# Patient Record
Sex: FEMALE | Race: ASIAN | NOT HISPANIC OR LATINO
[De-identification: names, ages, dates, MRNs, and addresses within clinical notes are randomized per-mention and may not be internally consistent; named-entity substitution may affect disease eponyms.]

---

## 2024-01-29 PROBLEM — Z00.00 ENCOUNTER FOR PREVENTIVE HEALTH EXAMINATION: Status: ACTIVE | Noted: 2024-01-29

## 2024-03-04 ENCOUNTER — APPOINTMENT (OUTPATIENT)
Dept: GERIATRICS | Facility: CLINIC | Age: 89
End: 2024-03-04
Payer: MEDICARE

## 2024-03-04 VITALS
OXYGEN SATURATION: 97 % | DIASTOLIC BLOOD PRESSURE: 66 MMHG | SYSTOLIC BLOOD PRESSURE: 169 MMHG | WEIGHT: 133 LBS | HEIGHT: 57 IN | TEMPERATURE: 97.4 F | BODY MASS INDEX: 28.69 KG/M2 | HEART RATE: 58 BPM

## 2024-03-04 DIAGNOSIS — D64.9 ANEMIA, UNSPECIFIED: ICD-10-CM

## 2024-03-04 DIAGNOSIS — J30.9 ALLERGIC RHINITIS, UNSPECIFIED: ICD-10-CM

## 2024-03-04 DIAGNOSIS — Z13.21 ENCOUNTER FOR SCREENING FOR NUTRITIONAL DISORDER: ICD-10-CM

## 2024-03-04 DIAGNOSIS — Z11.59 ENCOUNTER FOR SCREENING FOR OTHER VIRAL DISEASES: ICD-10-CM

## 2024-03-04 DIAGNOSIS — E78.5 HYPERLIPIDEMIA, UNSPECIFIED: ICD-10-CM

## 2024-03-04 DIAGNOSIS — R60.0 LOCALIZED EDEMA: ICD-10-CM

## 2024-03-04 DIAGNOSIS — E55.9 VITAMIN D DEFICIENCY, UNSPECIFIED: ICD-10-CM

## 2024-03-04 DIAGNOSIS — Z82.0 FAMILY HISTORY OF EPILEPSY AND OTHER DISEASES OF THE NERVOUS SYSTEM: ICD-10-CM

## 2024-03-04 DIAGNOSIS — Z86.69 PERSONAL HISTORY OF OTHER DISEASES OF THE NERVOUS SYSTEM AND SENSE ORGANS: ICD-10-CM

## 2024-03-04 DIAGNOSIS — M19.90 UNSPECIFIED OSTEOARTHRITIS, UNSPECIFIED SITE: ICD-10-CM

## 2024-03-04 DIAGNOSIS — Z80.1 FAMILY HISTORY OF MALIGNANT NEOPLASM OF TRACHEA, BRONCHUS AND LUNG: ICD-10-CM

## 2024-03-04 DIAGNOSIS — Z80.42 FAMILY HISTORY OF MALIGNANT NEOPLASM OF PROSTATE: ICD-10-CM

## 2024-03-04 DIAGNOSIS — Z13.29 ENCOUNTER FOR SCREENING FOR OTHER SUSPECTED ENDOCRINE DISORDER: ICD-10-CM

## 2024-03-04 DIAGNOSIS — E03.9 HYPOTHYROIDISM, UNSPECIFIED: ICD-10-CM

## 2024-03-04 DIAGNOSIS — Z09 ENCOUNTER FOR FOLLOW-UP EXAMINATION AFTER COMPLETED TREATMENT FOR CONDITIONS OTHER THAN MALIGNANT NEOPLASM: ICD-10-CM

## 2024-03-04 PROCEDURE — 99205 OFFICE O/P NEW HI 60 MIN: CPT

## 2024-03-04 NOTE — REVIEW OF SYSTEMS
[Constipation] : constipation [Incontinence] : incontinence [As Noted in HPI] : as noted in HPI [Easy Bleeding] : a tendency for easy bleeding [Easy Bruising] : a tendency for easy bruising [Negative] : Integumentary

## 2024-03-06 LAB
25(OH)D3 SERPL-MCNC: 61.4 NG/ML
FOLATE SERPL-MCNC: >20 NG/ML
TSH SERPL-ACNC: 3.27 UIU/ML
VIT B12 SERPL-MCNC: 796 PG/ML

## 2024-03-08 LAB — VIT B1 SERPL-MCNC: 149 NMOL/L

## 2024-03-14 ENCOUNTER — APPOINTMENT (OUTPATIENT)
Dept: GERIATRICS | Facility: CLINIC | Age: 89
End: 2024-03-14
Payer: MEDICARE

## 2024-03-14 VITALS
DIASTOLIC BLOOD PRESSURE: 67 MMHG | SYSTOLIC BLOOD PRESSURE: 146 MMHG | WEIGHT: 133 LBS | HEIGHT: 57 IN | HEART RATE: 57 BPM | OXYGEN SATURATION: 98 % | TEMPERATURE: 97.4 F | BODY MASS INDEX: 28.69 KG/M2

## 2024-03-14 PROCEDURE — 93000 ELECTROCARDIOGRAM COMPLETE: CPT | Mod: 1L

## 2024-03-14 PROCEDURE — 99483 ASSMT & CARE PLN PT COG IMP: CPT

## 2024-03-22 ENCOUNTER — OUTPATIENT (OUTPATIENT)
Dept: OUTPATIENT SERVICES | Facility: HOSPITAL | Age: 89
LOS: 1 days | End: 2024-03-22
Payer: MEDICARE

## 2024-03-22 ENCOUNTER — APPOINTMENT (OUTPATIENT)
Dept: GERIATRICS | Facility: CLINIC | Age: 89
End: 2024-03-22
Payer: MEDICARE

## 2024-03-22 ENCOUNTER — APPOINTMENT (OUTPATIENT)
Dept: ULTRASOUND IMAGING | Facility: CLINIC | Age: 89
End: 2024-03-22
Payer: MEDICARE

## 2024-03-22 ENCOUNTER — TRANSCRIPTION ENCOUNTER (OUTPATIENT)
Age: 89
End: 2024-03-22

## 2024-03-22 DIAGNOSIS — M79.605 PAIN IN LEFT LEG: ICD-10-CM

## 2024-03-22 DIAGNOSIS — Z71.89 OTHER SPECIFIED COUNSELING: ICD-10-CM

## 2024-03-22 DIAGNOSIS — H91.90 UNSPECIFIED HEARING LOSS, UNSPECIFIED EAR: ICD-10-CM

## 2024-03-22 PROBLEM — M19.90 ARTHRITIS: Status: ACTIVE | Noted: 2024-03-22

## 2024-03-22 PROBLEM — E03.9 HYPOTHYROIDISM, UNSPECIFIED TYPE: Status: ACTIVE | Noted: 2024-03-22

## 2024-03-22 PROBLEM — J30.9 ALLERGIC RHINITIS, UNSPECIFIED SEASONALITY, UNSPECIFIED TRIGGER: Status: ACTIVE | Noted: 2024-03-22

## 2024-03-22 PROCEDURE — 99214 OFFICE O/P EST MOD 30 MIN: CPT | Mod: 95

## 2024-03-22 PROCEDURE — 93971 EXTREMITY STUDY: CPT

## 2024-03-22 PROCEDURE — 93971 EXTREMITY STUDY: CPT | Mod: 26,LT

## 2024-03-22 PROCEDURE — G2211 COMPLEX E/M VISIT ADD ON: CPT

## 2024-03-22 RX ORDER — LEVOTHYROXINE SODIUM 0.03 MG/1
25 TABLET ORAL
Qty: 90 | Refills: 0 | Status: ACTIVE | COMMUNITY
Start: 2024-03-22

## 2024-03-22 RX ORDER — ACETAMINOPHEN 500 MG/1
500 TABLET, COATED ORAL EVERY 8 HOURS
Refills: 0 | Status: ACTIVE | COMMUNITY
Start: 2024-03-22

## 2024-03-22 RX ORDER — AMLODIPINE BESYLATE 5 MG/1
5 TABLET ORAL TWICE DAILY
Refills: 0 | Status: ACTIVE | COMMUNITY
Start: 2024-03-22

## 2024-03-22 RX ORDER — LORATADINE 10 MG/1
10 TABLET ORAL
Refills: 0 | Status: ACTIVE | COMMUNITY
Start: 2024-03-22

## 2024-03-22 NOTE — PHYSICAL EXAM
[Normal] : alert, in no acute distress [Normal Outer Ear/Nose] : the ears and nose were normal in appearance [No Clubbing, Cyanosis] : no clubbing or cyanosis of the fingernails [Involuntary Movements] : no involuntary movements were seen [Respiration, Rhythm And Depth] : normal respiratory rhythm and effort [No Respiratory Distress] : no respiratory distress [Normal Hearing] : hearing was not normal [Normal Gait] : abnormal gait [Normal Insight/Judgment] : insight and judgment were not intact [de-identified] : b/l LE pitting edema  [de-identified] : slow, cautious, unsteady  [de-identified] : chronic skin changes b/l LE  [de-identified] : gait instability  [de-identified] : calm, dysphoric mood, +delusions  [MocaTotal] : 9 [FreeTextEntry1] : 3/14/24

## 2024-03-22 NOTE — REVIEW OF SYSTEMS
[Loss Of Hearing] : hearing loss [Constipation] : constipation [Incontinence] : incontinence [As Noted in HPI] : as noted in HPI [Easy Bleeding] : a tendency for easy bleeding [Easy Bruising] : a tendency for easy bruising [Negative] : Endocrine

## 2024-03-22 NOTE — HISTORY OF PRESENT ILLNESS
[1] : 1) Little interest or pleasure doing things for several days (1) [3] : 2) Feeling down, depressed, or hopeless for nearly every day (3) [PHQ-2 Positive] : PHQ-2 Positive [One fall no injury in past year] : Patient reported one fall in the past year without injury [FreeTextEntry1] : PMD Dr. Emmanuel Quezada   Memory changes progressively worse for past several years.   24/7 HHA.  has dementia.  3 HHAs, mainly for  who has UI and nocturia, diverticulosis with episodes of blood in stool.  Pt sleeps in separate bedroom.   - Mood/behavior:  down, sad, anxious.  Feels better when family comes over/family gatherings. Withdrawn from activities - not reading books anymore. Primary CG for  with dementia. HHA help started in 2022.  Still sowing.    - Motor Syx: Ambulates with cane outside. Walker at home 85% of the time.  Fall in 2/24 - 2nd night of being on Ambien.  CHRONIC NECK PAIN CHRONIC KNEE PAIN  - Sleep:  Difficulty sleeping for past several years. Sensitive to noise. Sleeps in separate bedroom from . Leaves door half-way open in case  needs help. Pt states  doesn't feel safe when she's not with him. Pt states this past Wed her  told her that the HHA allowed her son who is 22 y/o to come into their home and attached him while he was sleeping. Pt found  crying the following morning and asked him what happened and so he told her. Pt states the HHA's son has come came over previously 2-3x in the past few years.  DIL states pt sleeps approx. 2-4hrs of sleep "on a good night."   - Tried Ambien for insomia per family's request - did not work - side effects   - Appetite: Eating more now that family is bringing more food.  Indigestion after dinner. Takes beano.  Got dentures in 10/23 - eating more now.   - Toileting: Chronic constipation improves w/ prune.  Wears depends at night for UI episodes at night.     HTN / HLD  HYPOTHYROIDISM  [TextBox_37] : Last vision exam prepandemic, wears reading glasses  [[facility]] : >12 [[id number]] : 4 [FreeTextEntry4] : HCP form not on file: primary HCP is oldest wang Barrera (MD).   MOLST:

## 2024-03-22 NOTE — HISTORY OF PRESENT ILLNESS
[One fall no injury in past year] : Patient reported one fall in the past year without injury [Independent] : transferring/mobility [Full assistance needed] : Assistance needed managing medications [FAST Score: ____] : Functional Assessment Scale (FAST) Score: [unfilled] [Walker] : walker [Smoke Detector] : smoke detector [Carbon Monoxide Detector] : carbon monoxide detector [Wears Seat Belt] : wears seat belt [Other reason not done] : Other reason not done [I have developed a follow-up plan documented below in the note.] : I have developed a follow-up plan documented below in the note. [Moderate] : Stage: Moderate [Stable] : Status: Stable [Memory Lapses Or Loss] : stable memory impairment [Hostility Toward Caregivers] : stable aggression [Patient Observed To Be Agitated] : stable agitation [Sleep Disturbances] : stable sleep disturbances [] : stable wandering [Difficulty Finding Desired Words] : stable difficulty finding desired words [Fixed Beliefs Contradicted By Reality (Delusions)] : stable delusions [With Patient/Caregiver] : , with patient/caregiver [Designated Healthcare Proxy] : Designated healthcare proxy [Home] : at home, [unfilled] , at the time of the visit. [Medical Office: ([facility])___] : at the medical office located in  [Family Member] : family member [FreeTextEntry3] : WILNER Langley  [TextBox_37] : Last vision exam prepandemic, wears reading glasses  [FreeTextEntry1] : PMD Dr. Emmanuel Quezada   No acute events since last visit including falls, hospitalizations, ED visits, urgent care visits.  TODAY pt reports still not sleeping well but she is sleeping better since last visit.   Family states pt noted to have decreased severity of paranoid psychosis since starting Trazodone after last visit.  Family reports pt c/o Lt calf pain yesterday - this is new.  Today pt states had pain in anterior lower leg - higher up than usual shin level chronic pain.  Legs are swollen but not more than usual.  Gait has been stable c/w prior visit. No recent falls.  Otherwise in USOH per family.   - Mood/behavior:  down, sad, anxious.  Feels better when family comes over/family gatherings. Withdrawn from activities - not reading books anymore.  Still sowing.    - Motor Syx: Ambulates with cane outside. Uses walker at home 85% of the time.  Fall in 2/24 - 2nd night of being on Ambien.  CHRONIC NECK and KNEE PAIN - Takes Tylenol PRN   - Sleep:  Was "lethargic" and sleeping longer for several days after starting med but today is much better after sleeping much better last night.  - 3/14/24: Difficulty sleeping for past several years. Sensitive to noise. Sleeps in separate bedroom from . Leaves door half-way open in case  needs help. Pt states  doesn't feel safe when she's not with him. Pt states this past Wed her  told her that the HHA allowed her son who is 22 y/o to come into their home and attached him while he was sleeping. Pt found  crying the following morning and asked him what happened and so he told her. Pt states the HHA's son has come came over previously 2-3x in the past few years.  DIL states pt sleeps approx. 2-4hrs of sleep "on a good night."   - Tried Ambien for insomnia per family's request - did not work - side effects   - Appetite: Eating more now that family is bringing more food.  Indigestion after dinner. Takes beano.  Got dentures in 10/23.  - Toileting: Chronic constipation improves w/ prunes.  Wears depends at night for UI at night.   COGNITIVE DECLINE - 2/4/24: Memory changes progressively worse for past several years.   24/7 HHA.  has dementia.  3 HHAs, mainly for  who has UI and nocturia, diverticulosis with episodes of blood in stool.  Pt sleeps in separate bedroom.  - MoCA 9/30 w/ Cantonese translation on 3/14/24  HTN / HLD HYPOTHYROIDISM  [Driving Concerns] : not driving or driving without noted concerns [[facility]] : >12 [de-identified] : Few steps to get into 1st floor. Gets assistance to go/in out of home when goes out rarely.  [de-identified] : not driving  [de-identified] : On Antidepressant medication [GDS] : 7 on 3/14/24 per pt  [CorneScale] : 23 on 3/14/24 per family  [AdvancecareDate] : 3/14/24 [FreeTextEntry4] : HCP form on file: primary HCP is dtr Shakira (in [place]), then oldest son Bruce (doctor), then dtr Birgit documented however she passed away in 2020, then son Bert.      [facility] previously discussed:  Priority is improving sleep.  MOLST not on file - pending completion

## 2024-03-22 NOTE — REASON FOR VISIT
[Initial Evaluation] : an initial evaluation [Family Member] : family member [FreeTextEntry3] : wang Noel  and WILNER Hernandez  [FreeTextEntry1] : Dementia  [FreeTextEntry2] : who assist with history due to patient with baseline cognitive impairment

## 2024-03-22 NOTE — ASSESSMENT
[FreeTextEntry1] : Venous US ordered, r/o DVT Tylenol 1g TID for pain control discussed  Leg elevation when not walking  - Would maximize sensory input - hearing, vision, etc. f/u with ophto and ENT/audiology - Medications reviewed and discussed - at this time will hold off on antipsychotics but will consider in the future for psychosis if needed  Discussed r/b/a of continued trial of Trazodone for insomnia/mood - agree to c/w 25mg QHS for now.  We agree to re-evaluate in 1 week - Melatonin?  Monitor for AE.  Fall precautions discussed.  Sleep hygiene discussed - c/w 24/7 assistance for safety and help w/ ADLs - PT?   - Dementia Medications discussed - will hold off for now as priority is improving insomnia/paranoia symptoms. Would not trial ACHEi in setting of bradycardia.  May consider trial Namenda but hold off for now.   - Adv regular meals with company, MIND or Mediterranean diet - Advised inc daytime physical/cognitive activity - to consider formal day program and respite stays when possible/needed - Education, counseling, and support provided - f/u with SW for additional counseling, education, support, and community resources - including available support groups and classes for caregiver, and continuation of safety counseling including re: risk of financial errors/abuse, falls, injury, wandering, firearm safety - f/u for continuation of safety counseling including re: risk of financial errors/abuse, falls, injury, wandering, firearm safety, driving/accidents   - f/u with DCS/NP Patricia for ADC program enrollment   f/u ADLs, work hx, ACP discussion Will attempt EKG again at next in person visit ENT/audiology?  Optho f/u ?   f/u with me in 1wk, unless earlier PRN   Rest as per PMD

## 2024-03-22 NOTE — ASSESSMENT
[FreeTextEntry1] : - Pertinent available medical record reviewed- Medications reviewed with CG and reconciled - Recent labs from 2/26/24 reivewed in [place] Additional lab work ordered - f/u results   - Plan for formal cognitive/mood/behavior assessment at next visit  Will discuss sleep further at next visit Sleep hygiene ENT/audiology?  Optho f/u ?   Pend - ADLs, work hx, ACP discussion f/u HCP form - family to bring to next visit Consider EKG at next visit  Fall precautions - c/w 24/7 assistance for safety and help w/ ADLs   Rest as per PMD   f/u with me in 1-2 wks, unless earlier PRN

## 2024-03-22 NOTE — REASON FOR VISIT
[Follow-Up] : a follow-up visit [Family Member] : family member [FreeTextEntry1] : Dementia, paranoid delusions, insomnia  [FreeTextEntry2] : who assist with history due to patient with baseline cognitive impairment [FreeTextEntry3] : wang Noel  and WILNER Hernandez

## 2024-03-22 NOTE — PHYSICAL EXAM
[Normal Outer Ear/Nose] : the ears and nose were normal in appearance [Pedal Pulses Normal] : the pedal pulses are present [Normal] : no spinal tenderness [No Clubbing, Cyanosis] : no clubbing or cyanosis of the fingernails [Involuntary Movements] : no involuntary movements were seen [Normal Hearing] : hearing was not normal [Normal Gait] : abnormal gait [Normal Insight/Judgment] : insight and judgment were not intact [FreeTextEntry1] : b/l eyelid ptosis  [de-identified] : chronic skin changes b/l LE  [de-identified] : gait instability  [de-identified] : b/l LE pitting edema  [de-identified] : calm, dysphoric mood, +delusions

## 2024-03-28 ENCOUNTER — APPOINTMENT (OUTPATIENT)
Dept: GERIATRICS | Facility: CLINIC | Age: 89
End: 2024-03-28

## 2024-04-01 ENCOUNTER — APPOINTMENT (OUTPATIENT)
Dept: GERIATRICS | Facility: CLINIC | Age: 89
End: 2024-04-01

## 2024-04-01 RX ORDER — POLYETHYLENE GLYCOL 3350 17 G/17G
17 POWDER, FOR SOLUTION ORAL
Qty: 1 | Refills: 3 | Status: ACTIVE | COMMUNITY
Start: 2024-04-01 | End: 1900-01-01

## 2024-04-09 ENCOUNTER — NON-APPOINTMENT (OUTPATIENT)
Age: 89
End: 2024-04-09

## 2024-04-09 ENCOUNTER — APPOINTMENT (OUTPATIENT)
Dept: GERIATRICS | Facility: CLINIC | Age: 89
End: 2024-04-09
Payer: MEDICARE

## 2024-04-09 VITALS
DIASTOLIC BLOOD PRESSURE: 61 MMHG | HEART RATE: 67 BPM | TEMPERATURE: 97.5 F | SYSTOLIC BLOOD PRESSURE: 139 MMHG | OXYGEN SATURATION: 96 % | WEIGHT: 125.5 LBS | BODY MASS INDEX: 27.08 KG/M2 | HEIGHT: 57 IN

## 2024-04-09 DIAGNOSIS — Z79.899 OTHER LONG TERM (CURRENT) DRUG THERAPY: ICD-10-CM

## 2024-04-09 DIAGNOSIS — R26.89 OTHER ABNORMALITIES OF GAIT AND MOBILITY: ICD-10-CM

## 2024-04-09 DIAGNOSIS — R26.81 UNSTEADINESS ON FEET: ICD-10-CM

## 2024-04-09 PROCEDURE — G2211 COMPLEX E/M VISIT ADD ON: CPT

## 2024-04-09 PROCEDURE — 99214 OFFICE O/P EST MOD 30 MIN: CPT

## 2024-04-09 PROCEDURE — 93000 ELECTROCARDIOGRAM COMPLETE: CPT | Mod: 59

## 2024-04-21 ENCOUNTER — INPATIENT (INPATIENT)
Facility: HOSPITAL | Age: 89
LOS: 10 days | Discharge: HOME | End: 2024-05-02
Attending: INTERNAL MEDICINE | Admitting: INTERNAL MEDICINE
Payer: MEDICARE

## 2024-04-21 VITALS
RESPIRATION RATE: 18 BRPM | DIASTOLIC BLOOD PRESSURE: 44 MMHG | OXYGEN SATURATION: 100 % | SYSTOLIC BLOOD PRESSURE: 112 MMHG | HEART RATE: 44 BPM | TEMPERATURE: 98 F

## 2024-04-21 DIAGNOSIS — E78.5 HYPERLIPIDEMIA, UNSPECIFIED: ICD-10-CM

## 2024-04-21 DIAGNOSIS — M48.00 SPINAL STENOSIS, SITE UNSPECIFIED: ICD-10-CM

## 2024-04-21 DIAGNOSIS — Z29.9 ENCOUNTER FOR PROPHYLACTIC MEASURES, UNSPECIFIED: ICD-10-CM

## 2024-04-21 DIAGNOSIS — Z98.49 CATARACT EXTRACTION STATUS, UNSPECIFIED EYE: Chronic | ICD-10-CM

## 2024-04-21 DIAGNOSIS — I10 ESSENTIAL (PRIMARY) HYPERTENSION: ICD-10-CM

## 2024-04-21 DIAGNOSIS — R00.1 BRADYCARDIA, UNSPECIFIED: ICD-10-CM

## 2024-04-21 DIAGNOSIS — R93.89 ABNORMAL FINDINGS ON DIAGNOSTIC IMAGING OF OTHER SPECIFIED BODY STRUCTURES: ICD-10-CM

## 2024-04-21 DIAGNOSIS — R41.89 OTHER SYMPTOMS AND SIGNS INVOLVING COGNITIVE FUNCTIONS AND AWARENESS: ICD-10-CM

## 2024-04-21 DIAGNOSIS — R53.83 OTHER FATIGUE: ICD-10-CM

## 2024-04-21 DIAGNOSIS — F03.90 UNSPECIFIED DEMENTIA WITHOUT BEHAVIORAL DISTURBANCE: ICD-10-CM

## 2024-04-21 DIAGNOSIS — Z83.518 FAMILY HISTORY OF OTHER SPECIFIED EYE DISORDER: Chronic | ICD-10-CM

## 2024-04-21 LAB
A1C WITH ESTIMATED AVERAGE GLUCOSE RESULT: 6.1 % — HIGH (ref 4–5.6)
ADD ON TEST-SPECIMEN IN LAB: SIGNIFICANT CHANGE UP
ADD ON TEST-SPECIMEN IN LAB: SIGNIFICANT CHANGE UP
ALBUMIN SERPL ELPH-MCNC: 4.1 G/DL — SIGNIFICANT CHANGE UP (ref 3.3–5)
ALP SERPL-CCNC: 36 U/L — LOW (ref 40–120)
ALT FLD-CCNC: 13 U/L — SIGNIFICANT CHANGE UP (ref 4–33)
ANION GAP SERPL CALC-SCNC: 15 MMOL/L — HIGH (ref 7–14)
APTT BLD: 32 SEC — SIGNIFICANT CHANGE UP (ref 24.5–35.6)
AST SERPL-CCNC: 36 U/L — HIGH (ref 4–32)
BASE EXCESS BLDV CALC-SCNC: 2 MMOL/L — SIGNIFICANT CHANGE UP (ref -2–3)
BASOPHILS # BLD AUTO: 0.03 K/UL — SIGNIFICANT CHANGE UP (ref 0–0.2)
BASOPHILS NFR BLD AUTO: 0.6 % — SIGNIFICANT CHANGE UP (ref 0–2)
BILIRUB SERPL-MCNC: 0.5 MG/DL — SIGNIFICANT CHANGE UP (ref 0.2–1.2)
BUN SERPL-MCNC: 29 MG/DL — HIGH (ref 7–23)
CA-I SERPL-SCNC: 1.39 MMOL/L — HIGH (ref 1.15–1.33)
CALCIUM SERPL-MCNC: 10.6 MG/DL — HIGH (ref 8.4–10.5)
CHLORIDE BLDV-SCNC: 105 MMOL/L — SIGNIFICANT CHANGE UP (ref 96–108)
CHLORIDE SERPL-SCNC: 105 MMOL/L — SIGNIFICANT CHANGE UP (ref 98–107)
CHOLEST SERPL-MCNC: 168 MG/DL — SIGNIFICANT CHANGE UP
CK SERPL-CCNC: 118 U/L — SIGNIFICANT CHANGE UP (ref 25–170)
CO2 BLDV-SCNC: 30.2 MMOL/L — HIGH (ref 22–26)
CO2 SERPL-SCNC: 21 MMOL/L — LOW (ref 22–31)
CREAT SERPL-MCNC: 1.18 MG/DL — SIGNIFICANT CHANGE UP (ref 0.5–1.3)
EGFR: 43 ML/MIN/1.73M2 — LOW
EOSINOPHIL # BLD AUTO: 0.22 K/UL — SIGNIFICANT CHANGE UP (ref 0–0.5)
EOSINOPHIL NFR BLD AUTO: 4.6 % — SIGNIFICANT CHANGE UP (ref 0–6)
ESTIMATED AVERAGE GLUCOSE: 128 — SIGNIFICANT CHANGE UP
GAS PNL BLDV: 138 MMOL/L — SIGNIFICANT CHANGE UP (ref 136–145)
GAS PNL BLDV: SIGNIFICANT CHANGE UP
GLUCOSE BLDV-MCNC: 109 MG/DL — HIGH (ref 70–99)
GLUCOSE SERPL-MCNC: 112 MG/DL — HIGH (ref 70–99)
HCO3 BLDV-SCNC: 29 MMOL/L — SIGNIFICANT CHANGE UP (ref 22–29)
HCT VFR BLD CALC: 33.1 % — LOW (ref 34.5–45)
HCT VFR BLDA CALC: 34 % — LOW (ref 34.5–46.5)
HDLC SERPL-MCNC: 74 MG/DL — SIGNIFICANT CHANGE UP
HGB BLD CALC-MCNC: 11.4 G/DL — LOW (ref 11.7–16.1)
HGB BLD-MCNC: 11.1 G/DL — LOW (ref 11.5–15.5)
IANC: 2.53 K/UL — SIGNIFICANT CHANGE UP (ref 1.8–7.4)
IMM GRANULOCYTES NFR BLD AUTO: 0.2 % — SIGNIFICANT CHANGE UP (ref 0–0.9)
INR BLD: 1.05 RATIO — SIGNIFICANT CHANGE UP (ref 0.85–1.18)
LACTATE BLDV-MCNC: 1.4 MMOL/L — SIGNIFICANT CHANGE UP (ref 0.5–2)
LIPID PNL WITH DIRECT LDL SERPL: 75 MG/DL — SIGNIFICANT CHANGE UP
LYMPHOCYTES # BLD AUTO: 1.44 K/UL — SIGNIFICANT CHANGE UP (ref 1–3.3)
LYMPHOCYTES # BLD AUTO: 29.9 % — SIGNIFICANT CHANGE UP (ref 13–44)
MAGNESIUM SERPL-MCNC: 1.5 MG/DL — LOW (ref 1.6–2.6)
MCHC RBC-ENTMCNC: 33.5 GM/DL — SIGNIFICANT CHANGE UP (ref 32–36)
MCHC RBC-ENTMCNC: 33.7 PG — SIGNIFICANT CHANGE UP (ref 27–34)
MCV RBC AUTO: 100.6 FL — HIGH (ref 80–100)
MONOCYTES # BLD AUTO: 0.58 K/UL — SIGNIFICANT CHANGE UP (ref 0–0.9)
MONOCYTES NFR BLD AUTO: 12.1 % — SIGNIFICANT CHANGE UP (ref 2–14)
NEUTROPHILS # BLD AUTO: 2.53 K/UL — SIGNIFICANT CHANGE UP (ref 1.8–7.4)
NEUTROPHILS NFR BLD AUTO: 52.6 % — SIGNIFICANT CHANGE UP (ref 43–77)
NON HDL CHOLESTEROL: 94 MG/DL — SIGNIFICANT CHANGE UP
NRBC # BLD: 0 /100 WBCS — SIGNIFICANT CHANGE UP (ref 0–0)
NRBC # FLD: 0 K/UL — SIGNIFICANT CHANGE UP (ref 0–0)
PCO2 BLDV: 53 MMHG — HIGH (ref 39–52)
PH BLDV: 7.34 — SIGNIFICANT CHANGE UP (ref 7.32–7.43)
PLATELET # BLD AUTO: 158 K/UL — SIGNIFICANT CHANGE UP (ref 150–400)
PO2 BLDV: 36 MMHG — SIGNIFICANT CHANGE UP (ref 25–45)
POTASSIUM BLDV-SCNC: 4.5 MMOL/L — SIGNIFICANT CHANGE UP (ref 3.5–5.1)
POTASSIUM SERPL-MCNC: 4.5 MMOL/L — SIGNIFICANT CHANGE UP (ref 3.5–5.3)
POTASSIUM SERPL-SCNC: 4.5 MMOL/L — SIGNIFICANT CHANGE UP (ref 3.5–5.3)
PROT SERPL-MCNC: 7.2 G/DL — SIGNIFICANT CHANGE UP (ref 6–8.3)
PROTHROM AB SERPL-ACNC: 11.7 SEC — SIGNIFICANT CHANGE UP (ref 9.5–13)
RBC # BLD: 3.29 M/UL — LOW (ref 3.8–5.2)
RBC # FLD: 13.2 % — SIGNIFICANT CHANGE UP (ref 10.3–14.5)
SAO2 % BLDV: 60.2 % — LOW (ref 67–88)
SODIUM SERPL-SCNC: 141 MMOL/L — SIGNIFICANT CHANGE UP (ref 135–145)
TRIGL SERPL-MCNC: 93 MG/DL — SIGNIFICANT CHANGE UP
TROPONIN T, HIGH SENSITIVITY RESULT: 51 NG/L — HIGH
TROPONIN T, HIGH SENSITIVITY RESULT: 57 NG/L — CRITICAL HIGH
WBC # BLD: 4.81 K/UL — SIGNIFICANT CHANGE UP (ref 3.8–10.5)
WBC # FLD AUTO: 4.81 K/UL — SIGNIFICANT CHANGE UP (ref 3.8–10.5)

## 2024-04-21 PROCEDURE — 99222 1ST HOSP IP/OBS MODERATE 55: CPT | Mod: GC

## 2024-04-21 PROCEDURE — 70498 CT ANGIOGRAPHY NECK: CPT | Mod: 26,MC

## 2024-04-21 PROCEDURE — 71045 X-RAY EXAM CHEST 1 VIEW: CPT | Mod: 26

## 2024-04-21 PROCEDURE — 99223 1ST HOSP IP/OBS HIGH 75: CPT

## 2024-04-21 PROCEDURE — 99497 ADVNCD CARE PLAN 30 MIN: CPT | Mod: 25

## 2024-04-21 PROCEDURE — 70496 CT ANGIOGRAPHY HEAD: CPT | Mod: 26,MC

## 2024-04-21 PROCEDURE — 99285 EMERGENCY DEPT VISIT HI MDM: CPT

## 2024-04-21 RX ORDER — MAGNESIUM SULFATE 500 MG/ML
2 VIAL (ML) INJECTION ONCE
Refills: 0 | Status: COMPLETED | OUTPATIENT
Start: 2024-04-21 | End: 2024-04-21

## 2024-04-21 RX ORDER — SODIUM CHLORIDE 9 MG/ML
500 INJECTION, SOLUTION INTRAVENOUS
Refills: 0 | Status: COMPLETED | OUTPATIENT
Start: 2024-04-21 | End: 2024-04-21

## 2024-04-21 RX ORDER — ACETAMINOPHEN 500 MG
650 TABLET ORAL EVERY 6 HOURS
Refills: 0 | Status: DISCONTINUED | OUTPATIENT
Start: 2024-04-21 | End: 2024-05-02

## 2024-04-21 RX ORDER — FOLIC ACID 0.8 MG
1 TABLET ORAL DAILY
Refills: 0 | Status: DISCONTINUED | OUTPATIENT
Start: 2024-04-21 | End: 2024-05-02

## 2024-04-21 RX ORDER — HEPARIN SODIUM 5000 [USP'U]/ML
5000 INJECTION INTRAVENOUS; SUBCUTANEOUS EVERY 12 HOURS
Refills: 0 | Status: DISCONTINUED | OUTPATIENT
Start: 2024-04-21 | End: 2024-05-02

## 2024-04-21 RX ORDER — ASPIRIN/CALCIUM CARB/MAGNESIUM 324 MG
81 TABLET ORAL DAILY
Refills: 0 | Status: DISCONTINUED | OUTPATIENT
Start: 2024-04-21 | End: 2024-05-02

## 2024-04-21 RX ORDER — LANOLIN ALCOHOL/MO/W.PET/CERES
6 CREAM (GRAM) TOPICAL
Refills: 0 | Status: DISCONTINUED | OUTPATIENT
Start: 2024-04-21 | End: 2024-05-02

## 2024-04-21 RX ORDER — LEVOTHYROXINE SODIUM 125 MCG
25 TABLET ORAL DAILY
Refills: 0 | Status: DISCONTINUED | OUTPATIENT
Start: 2024-04-21 | End: 2024-05-02

## 2024-04-21 RX ORDER — ATORVASTATIN CALCIUM 80 MG/1
10 TABLET, FILM COATED ORAL AT BEDTIME
Refills: 0 | Status: DISCONTINUED | OUTPATIENT
Start: 2024-04-21 | End: 2024-05-02

## 2024-04-21 RX ADMIN — SODIUM CHLORIDE 500 MILLILITER(S): 9 INJECTION, SOLUTION INTRAVENOUS at 21:45

## 2024-04-21 RX ADMIN — Medication 6 MILLIGRAM(S): at 22:32

## 2024-04-21 RX ADMIN — ATORVASTATIN CALCIUM 10 MILLIGRAM(S): 80 TABLET, FILM COATED ORAL at 22:34

## 2024-04-21 RX ADMIN — Medication 25 GRAM(S): at 11:50

## 2024-04-21 NOTE — CONSULT NOTE ADULT - SUBJECTIVE AND OBJECTIVE BOX
Patient seen and evaluated at bedside    Reason for consult: AMS    HPI:  Mrs. Patino is a 89yoF presenting to the ED after an episode of AMS.   PMH of HTN, HLD, Hypothyroidism, dementia     Patient was at an assisted living facility this am. She woke up with baseline motor/cognitive abilities. She walked with her walker and son to have breakfast when she became acutely altered.     According to the son the patient slumped sideways, could not speak, was blinking and tracking his movements but delayed and non-verbal. This event lasted 15minutes. When she started to recover she was altered and confused. Vital signs were taken by staff at the facility and noted to have bradycardia, HR 40's and  systolic.     Upon arrival to ED she is improving but still not at baseline per son. ECG shows SInus Akhil without ischemic changes with 's systolic. Her labs reveal normal perfusion markers, troponin is elevated to 57.       PMHx:   HTN (hypertension)    HLD (hyperlipidemia)    Hypothyroid    H/O: depression        PSHx:       Allergies:  No Known Allergies      Home Meds:    Current Medications:   magnesium sulfate  IVPB 2 Gram(s) IV Intermittent Once        Review of Systems:  Un-able to obtain: AMS    Physical Exam:  T(F): 97.5 (04-21), Max: 97.5 (04-21)  HR: 44 (04-21) (44 - 44)  BP: 112/44 (04-21) (112/44 - 112/44)  RR: 18 (04-21)  SpO2: 100% (04-21)  GENERAL: No acute distress, well-developed  HEAD:  Atraumatic, Normocephalic  ENT: EOMI, PERRLA, conjunctiva and sclera clear, Neck supple, No JVD, moist mucosa  CHEST/LUNG: Clear to auscultation bilaterally; No wheeze, equal breath sounds bilaterally   BACK: No spinal tenderness  HEART: Regular rate and rhythm  ABDOMEN: Soft, Nontender, Nondistended; Bowel sounds present  EXTREMITIES:  No clubbing, cyanosis, or edema  Neuro: AMS  SKIN: Normal color, No rashes or lesions      CXR: Personally reviewed    Labs: Personally reviewed                        11.1   4.81  )-----------( 158      ( 21 Apr 2024 10:43 )             33.1     04-21    141  |  105  |  29<H>  ----------------------------<  112<H>  4.5   |  21<L>  |  1.18    Ca    10.6<H>      21 Apr 2024 10:43  Mg     1.50     04-21    TPro  7.2  /  Alb  4.1  /  TBili  0.5  /  DBili  x   /  AST  36<H>  /  ALT  13  /  AlkPhos  36<L>  04-21    PT/INR - ( 21 Apr 2024 10:43 )   PT: 11.7 sec;   INR: 1.05 ratio         PTT - ( 21 Apr 2024 10:43 )  PTT:32.0 sec        Thyroid Stimulating Hormone, Serum: 3.39 uIU/mL (04-21 @ 10:43)

## 2024-04-21 NOTE — ED PROVIDER NOTE - OBJECTIVE STATEMENT
89-year-old female past medical history of hypothyroidism, hypercholesterolemia, hypertension, depression now presenting after episode of unresponsiveness lasting for 15 minutes.  Patient accompanied with the son at bedside who reports the patient was sitting in the dining table at which time lost consciousness, event was witnessed by aide and recorded on camera, episode lasted for 15 minutes at which time paramedics arrived onsite, patient had gradual resolution of symptoms.  Denies chest pain, shortness of breath.  No facial asymmetry/weakness, no arm/leg weakness/numbness.  At baseline mobilizes with a walker.  No episodes of fever, sore throat, neck pain, vomiting urinary/bowel complaints, skin rash/lesions, joint swellings.  There were no incidences of trauma/falls.  Patient is not on anticoagulants.  No prior similar history in the past.

## 2024-04-21 NOTE — ED PROVIDER NOTE - PHYSICAL EXAMINATION
PHYSICAL EXAM:  GENERAL: NAD, lying in bed comfortably  HEAD:  Atraumatic, Normocephalic  EYES: EOMI, PERRLA, conjunctiva and sclera clear  ENT: No erythema/pallor/petechiae/lesions  NECK: Supple, No JVD  LUNG: CTA b/l; no r/r/w  HEART: RRR, +S1/S2; No m/r/g  ABDOMEN: soft, NT/ND; BS audible   EXTREMITIES:  2+ Peripheral Pulses, brisk cap refill. No clubbing, cyanosis, or edema  NERVOUS SYSTEM:  AAOx3, speech clear. No sensory/motor deficits. Cannot assess gait.   MSK: FROM all 4 extremities, full and equal strength  SKIN: No rashes or lesions

## 2024-04-21 NOTE — ED PROVIDER NOTE - ATTENDING CONTRIBUTION TO CARE
89-year-old female past medical history hypothyroidism, hyperlipidemia, hypertension, depression presents after episode of loss of consciousness at home.  Patient was at rest, sitting at a table at onset of symptoms.  Per son at bedside episode lasted approximately 15 minutes.  Patient was reported to be breathing the entire time.  Patient found to be bradycardic, heart rate in 40s.  Given atropine by EMS without significant change.  Given atropine by EMS without significant change in heart rate.  No reported head trauma.	Facial asymmetry.  No recent illness.  No similar prior episodes.  Patient denies headache, chest pain, shortness of breath, abdominal pain, nausea, vomiting.  Exam as above  Unclear etiology of episode of loss of consciousness.  Concern for possible symptomatic bradycardia.  Patient normotensive at time of evaluation.  Plan: Labs, telemetry monitor, chest x-ray, head CT, reassess.  Discussed with son patient will likely need admission.

## 2024-04-21 NOTE — H&P ADULT - PROBLEM SELECTOR PLAN 6
lovenox ppx  PT eval  DNR/DNI, no PEG, no HD, yes IVF and abx  dc pending further eval  care d/w Jillian, children Lovenox ppx  PT eval  DNR/DNI, no PEG, no HD, yes IVF and abx  dc pending further eval  care d/w Jillian, children Noted on CT in c-spine  - OP f/u, PT

## 2024-04-21 NOTE — H&P ADULT - ASSESSMENT
89F dementia (A&Ox2, walks with walker, eats regular food) from home w/ 24/7 private hire aides, HTN, HLD, hypothyroid, recent onset of delusions who presents with unresponsive episode 4/21 at 911am while sitting down for am breakfast noted to have relative hypotension and bradycarida, admitted for further eval.

## 2024-04-21 NOTE — H&P ADULT - NSICDXPASTMEDICALHX_GEN_ALL_CORE_FT
PAST MEDICAL HISTORY:  Dementia     H/O: depression     HLD (hyperlipidemia)     HTN (hypertension)     Hypothyroid

## 2024-04-21 NOTE — H&P ADULT - NSHPLABSRESULTS_GEN_ALL_CORE
LABS:                        11.1   4.81  )-----------( 158      ( 21 Apr 2024 10:43 )             33.1     04-21    141  |  105  |  29<H>  ----------------------------<  112<H>  4.5   |  21<L>  |  1.18    Ca    10.6<H>      21 Apr 2024 10:43  Mg     1.50     04-21    TPro  7.2  /  Alb  4.1  /  TBili  0.5  /  DBili  x   /  AST  36<H>  /  ALT  13  /  AlkPhos  36<L>  04-21      PT/INR - ( 21 Apr 2024 10:43 )   PT: 11.7 sec;   INR: 1.05 ratio         PTT - ( 21 Apr 2024 10:43 )  PTT:32.0 sec      Urinalysis Basic - ( 21 Apr 2024 10:43 )  Color: x / Appearance: x / SG: x / pH: x  Gluc: 112 mg/dL / Ketone: x  / Bili: x / Urobili: x   Blood: x / Protein: x / Nitrite: x   Leuk Esterase: x / RBC: x / WBC x   Sq Epi: x / Non Sq Epi: x / Bacteria: x      VBG 04-21 @ 10:43  pH: 7.34/pCO2: 53 /pO2: 36/HCO3: 29/lactate: 1.4    IMPRESSION:    CT HEAD:  1.  No evidence of acute intracranial hemorrhage or midline shift.  2.  Subacute to chronic appearing infarcts in the bilateral basal ganglia.  3.  Chronic small vessel disease.    CTA BRAIN:  1.  Multifocal severe stenosis in the left A2 segment with distal   reconstitution.  2.  Moderate to severe focal narrowing in the proximal right M1 segment.  3.  Other focal mild to moderate luminal narrowings in the major   intracranial vasculature as discussed above.    CTA NECK:  1.  No evidence of critical stenosis by NASCET criteria.  2.  Nodular calcification in the right lobe of the thyroid. Recommend   nonemergent thyroid ultrasound for further characterization if clinically   indicated.  3.  The level degenerative change of the cervical spine, most   significantly at the C4-C5 level where there is severe narrowing of the   spinal canal. Recommend MRI of the cervical spine for further evaluation.  4.  Subcentimeter nodule in the right lung apex. Recommend follow-up   imaging as per Fleischner criteria.    EKG: SB

## 2024-04-21 NOTE — ED ADULT NURSE NOTE - NSFALLHARMRISKINTERV_ED_ALL_ED

## 2024-04-21 NOTE — ED ADULT NURSE NOTE - OBJECTIVE STATEMENT
Patient presented to ED from home via EMS with a chief complaint of syncopal episode earlier this AM around 9am with aide. As per son, she was sitting at the kitchen table and had a 15 minute episode of non-responsiveness. As per EMS when they arrived she was bradycardic in the low 40's and was given two doses of atropine, which at that point became somewhat responsive. Denies hitting head, dizziness, n/v/d, recent illness. Patient takes amlodipine and metoprolol XR but did not take her medications this AM. Denies any pain at this time  Patient is A/O x 4, responsive and able to speak in full sentences  NAD, skin intact, PERRLA, speech clear, neurologically intact with no deficits, strength b/l upper and lower extremities 5/5, sensation intact b/l upper and lower extremities, Rate and rhythm irregular sinus cookie S1 and S2 heard with no murmurs radial and pedal pulses present, capillary refill <3 , lungs clear b/l lobes throughout with no adventitious sounds or accessory muscle use, even and unlabored, abdomen soft and non-tender, bowel sounds heard x 4 quadrant, no edema noted. Safety maintained, bed in lowest position, call bell within reach, plan of care reviewed with patient , addressed all questions and concern, will continue to monitor patient.

## 2024-04-21 NOTE — H&P ADULT - PROBLEM SELECTOR PLAN 8
Lovenox ppx  PT eval  DNR/DNI, no PEG, no HD, yes IVF and abx  dc pending further eval  care d/w Jillian, children Lovenox ppx  PT eval  DNR/DNI, no PEG, no HD, yes IVF and abx  dc pending further eval  care d/w Jillian at bedside

## 2024-04-21 NOTE — ED PROVIDER NOTE - NSICDXPASTMEDICALHX_GEN_ALL_CORE_FT
PAST MEDICAL HISTORY:  H/O: depression     HLD (hyperlipidemia)     HTN (hypertension)     Hypothyroid

## 2024-04-21 NOTE — ED ADULT TRIAGE NOTE - CHIEF COMPLAINT QUOTE
ems reports pt unresponsive this am upon their arrival with heart rate - 40's.  given atropine 1 mg  x 2 with pt  more responsive.  hr 44 at present. pt awake, oriented  x 3, denies pain. c/o weakness

## 2024-04-21 NOTE — GOALS OF CARE CONVERSATION - ADVANCED CARE PLANNING - BILLING PROVIDER USE ONLY
Attending or ALICE Only Post-Care Instructions: I reviewed with the patient in detail post-care instructions. Patient is to wear sunprotection, and avoid picking at any of the treated lesions. Pt may apply Vaseline to crusted or scabbing areas. Number Of Freeze-Thaw Cycles: 1 freeze-thaw cycle Include Z78.9 (Other Specified Conditions Influencing Health Status) As An Associated Diagnosis?: No Medical Necessity Clause: This procedure was medically necessary because the lesions that were treated were: Duration Of Freeze Thaw-Cycle (Seconds): 10-15 Consent: The patient's consent was obtained including but not limited to risks of crusting, scabbing, blistering, scarring, darker or lighter pigmentary change, recurrence, incomplete removal and infection. Detail Level: Detailed Medical Necessity Information: It is in your best interest to select a reason for this procedure from the list below. All of these items fulfill various CMS LCD requirements except the new and changing color options.

## 2024-04-21 NOTE — H&P ADULT - NSHPPHYSICALEXAM_GEN_ALL_CORE
T(C): 36.3 (04-21-24 @ 17:05), Max: 36.9 (04-21-24 @ 14:03)  HR: 64 (04-21-24 @ 17:05) (44 - 68)  BP: 150/70 (04-21-24 @ 17:05) (112/44 - 178/82)  RR: 18 (04-21-24 @ 17:05) (15 - 18)  SpO2: 98% (04-21-24 @ 17:05) (96% - 100%)      CAPILLARY BLOOD GLUCOSE  POCT Blood Glucose.: 123 mg/dL (21 Apr 2024 10:34)  POCT Blood Glucose.: 118 mg/dL (21 Apr 2024 10:07)    PHYSICAL EXAM:  GENERAL: NAD, well-developed  HEAD:  Atraumatic, Normocephalic  EYES: EOMI, PERRLA, conjunctiva and sclera clear  NECK: Supple, no JVD  CHEST/LUNG: Clear to auscultation bilaterally; no wheeze  HEART: Regular rate and rhythm; no murmurs, rubs, or gallops  ABDOMEN: Soft, Nontender, Nondistended; Bowel sounds present  EXTREMITIES:  warm and well perfused, no clubbing, cyanosis, or edema  PSYCH: AAOx1, following commands   NEUROLOGY: non-focal  SKIN: hyperpigmented LE skin

## 2024-04-21 NOTE — H&P ADULT - PROBLEM SELECTOR PLAN 2
- as above  - saw by EP  - holding BB  - also holding trazodone   - inquired if family would want pacing for symptomatic bradycardia and possible PPM if deemed indicated, state no - as above  - saw by EP  - hypomagnesemia: repleted   - holding BB  - also holding trazodone   - inquired if family would want pacing for symptomatic bradycardia and possible PPM if deemed indicated, state no - as above  - saw by EP  - hypomagnesemia: repleted   - holding BB  - also holding trazodone (can cause cookie/hypotension)  - inquired if family would want pacing for symptomatic bradycardia and possible PPM if deemed indicated, state no

## 2024-04-21 NOTE — H&P ADULT - PROBLEM SELECTOR PLAN 3
At baseline can walk with walker, regular diet, knows family but can have short-term memory issues and forget dates and times, also has been having delusions  - hold trazodone and risperidone  - consult psych in am re: med management for home At baseline can walk with walker, regular diet, knows family but can have short-term memory issues and forget dates and times, also has been having delusions  - hold trazodone and risperidone  - consult psych in am re: med management for home given delusions distressing to pt reports being scared

## 2024-04-21 NOTE — CONSULT NOTE ADULT - ASSESSMENT
ED vitals notable for: ***. Labs notable for: ***. CT imaging disclosed: ***. Exam notable for ***.     Impression:    Recommendations:    Patient/plan d/w  ***. To be seen by attending in the AM with attestation to follow. Plan is not formalized until attending attestation is complete. Recommendations were relayed directly to ***. Note delayed d/t emergent patient care. ED vitals notable for: afebrile, HR 44-68, /82, RR 15-18, SpO2 % on RA, NSR on telemetry. Labs notable for: Hg 11.1, .6, TSH 3.39, troponin T 57->51, carbon dioxide 21, AG 15, BUN 29. CT imaging disclosed: as above - subacute to chronic appearing infarct in the bilateral caudate heads and left lenticulostriate nucleus, multifocal stenosis on CTA head.     Impression:   1) Episode of unresponsiveness of unclear etiology  2) History of dementia with behavioral disturbance, with worsening mentation in the last several weeks    Recommendations:  [] Telemetry monitoring  [] vEEG - to evaluate for focal slowing, epileptiform discharges, or seizures (event capture)   [] MRI brain w/wo contrast  [] TME labs to assess for reversible causes of encephalopathy (likely low-yield): ESR, CRP, vitamin B1/B6/B12/D, folate, ammonia, CK  [] If there is no medical contraindication - patient should be started on aspirin 81mg daily for secondary stroke prevention given the finding of chronic infarcts on CTH  [] If there is no medical contraindication - patient should be started on a high-intensity statin for secondary stroke prevention (target LDL <70)  [] Lipid panel, A1c  [] Elective TTE without bubble (can be performed outpatient)  [] Stroke education provided  [] Neurochecks, vitals at least q4h  [] Fall, seizure, aspiration precautions    To be seen by attending in the AM with attestation to follow. Plan is not formalized until attending attestation is complete. Recommendations were relayed directly to admitting hospitalist. Note delayed d/t emergent patient care. TOM MORGAN is a 89y RIGHT handed woman, with PMH significant for dementia, depression, hypothyroidism, HLD, HTN, who presents to [facility] ED on 3/21/2024, with c/o an episode of unresponsiveness, worsening mentation.    ED vitals notable for: afebrile, HR 44-68, /82, RR 15-18, SpO2 % on RA, NSR on telemetry. Labs notable for: Hg 11.1, .6, TSH 3.39, troponin T 57->51, carbon dioxide 21, AG 15, BUN 29. CT imaging disclosed: as above - subacute to chronic appearing infarct in the bilateral caudate heads and left lenticulostriate nucleus, multifocal stenosis on CTA head.     NIHSS: 6 (+2 questions, +2 aphasia, +2 dysarthria)  LKN: 4/21/2024, time unknown  pre-MRS: 3    No tenecteplase d/t outside therapeutic window.  No thrombectomy d/t no LVO.    Impression:   1) Episode of unresponsiveness of unclear etiology, without clear return to baseline; consider: TME, seizure, stroke  2) Acute to subacute encephalopathy over the course of several weeks i/s/o history of dementia with behavioral disturbance    Recommendations:  [] Telemetry monitoring  [] vEEG - to evaluate for focal slowing, epileptiform discharges, or seizures (event capture)  [] Hold off on ASMs for now   [] MRI brain w/wo contrast  [] TME labs to assess for reversible causes of encephalopathy (likely low-yield): ESR, CRP, vitamin B1/B6/B12/D, folate, ammonia, CK  [] If there is no medical contraindication - patient should be started on aspirin 81mg daily for secondary stroke prevention given the finding of chronic infarcts on CTH  [] Patient is on atorvastatin 10mg at home; target LDL <70 for secondary stroke prevention - agree that in this 89-year-old patient, the benefit is unclear  [] Lipid panel, A1c  [] Elective TTE without bubble (can be performed outpatient) - if within goals of care  [] Stroke education provided  [] Neurochecks, vitals at least q4h  [] Fall, seizure, aspiration precautions  [] GOC discussion was personally had with family - they would like to pursue neurologic workup inpatient including vEEG and MRI, if deemed appropriate by Neurology team    To be seen by attending in the AM with attestation to follow. Plan is not formalized until attending attestation is complete. Recommendations were relayed directly to admitting hospitalist. Note delayed d/t emergent patient care. TOM MORGAN is a 89y RIGHT handed woman, with PMH significant for dementia, depression, hypothyroidism, HLD, HTN, who presents to [facility] ED on 3/21/2024, with c/o an episode of unresponsiveness, worsening mentation.    ED vitals notable for: afebrile, HR 44-68, /82, RR 15-18, SpO2 % on RA, NSR on telemetry. Labs notable for: Hg 11.1, .6, TSH 3.39, troponin T 57->51, carbon dioxide 21, AG 15, BUN 29. CT imaging disclosed: as above - subacute to chronic appearing infarct in the bilateral caudate heads and left lenticulostriate nucleus, multifocal stenosis on CTA head.     NIHSS: 6 (+2 questions, +2 aphasia, +2 dysarthria)  LKN: 4/21/2024, time unknown  pre-MRS: 3    No tenecteplase d/t outside therapeutic window.  No thrombectomy d/t no LVO.    Impression:   1) Episode of unresponsiveness of unclear etiology, without clear return to baseline; consider: seizure - focal onset impaired awareness, hypersomnolence of Lewy Body dementia, TME - but no obvious etiology, stroke (not favored)  2) Acute to subacute encephalopathy over the course of several weeks i/s/o history of dementia with behavioral disturbance  3) Incidental finding of chronic infarcts on CTH. There is multifocal stenosis on CTA head.    Recommendations:  [] Telemetry monitoring  [] vEEG - to evaluate for focal slowing, epileptiform discharges, or seizures (event capture)  [] Hold off on ASMs for now   [] MRI brain w/wo contrast  [] TME labs to assess for reversible causes of encephalopathy (likely low-yield): ESR, CRP, vitamin B1/B6/B12/D, folate, ammonia, CK  [] If there is no medical contraindication - patient should be started on aspirin 81mg daily for secondary stroke prevention given the finding of chronic infarcts on CTH  [] Patient is on atorvastatin 10mg at home; target LDL <70 for secondary stroke prevention - agree that in this 89-year-old patient, the benefit is unclear  [] Lipid panel, A1c  [] Elective TTE without bubble (can be performed outpatient) - if within goals of care  [] Stroke education provided  [] Neurochecks, vitals at least q4h  [] Fall, seizure, aspiration precautions  [] GOC discussion was personally had with family - they would like to pursue neurologic workup inpatient including vEEG and MRI, if deemed appropriate by Neurology team    To be seen by attending in the AM with attestation to follow. Plan is not formalized until attending attestation is complete. Recommendations were relayed directly to admitting hospitalist. Note delayed d/t emergent patient care. TOM MORGAN is a 89y RIGHT handed woman, with PMH significant for dementia, depression, hypothyroidism, HLD, HTN, who presents to [facility] ED on 3/21/2024, with c/o an episode of unresponsiveness, worsening mentation.    ED vitals notable for: afebrile, HR 44-68, /82, RR 15-18, SpO2 % on RA, NSR on telemetry. Labs notable for: Hg 11.1, .6, TSH 3.39, troponin T 57->51, carbon dioxide 21, AG 15, BUN 29. CT imaging disclosed: as above - subacute to chronic appearing infarct in the bilateral caudate heads and left lenticulostriate nucleus, multifocal stenosis on CTA head.     NIHSS: 6 (+2 questions, +2 aphasia, +2 dysarthria)  LKN: 4/21/2024, time unknown  pre-MRS: 3    No tenecteplase d/t outside therapeutic window.  No thrombectomy d/t no LVO.    Impression:   1) Episode of unresponsiveness of unclear etiology, without clear return to baseline; consider: seizure - focal onset impaired awareness, hypersomnolence of Lewy Body dementia, TME - but no obvious trigger, stroke (not favored)  2) Acute to subacute encephalopathy over the course of several weeks i/s/o history of dementia with behavioral disturbance  3) Incidental finding of chronic infarcts on CTH. There is multifocal stenosis on CTA head.    Recommendations:  [] Telemetry monitoring  [] vEEG - to evaluate for focal slowing, epileptiform discharges, or seizures (event capture)  [] Hold off on ASMs for now   [] MRI brain w/wo contrast  [] TME labs to assess for reversible causes of encephalopathy (likely low-yield): ESR, CRP, vitamin B1/B6/B12/D, folate, ammonia, CK  [] If there is no medical contraindication - patient should be started on aspirin 81mg daily for secondary stroke prevention given the finding of chronic infarcts on CTH  [] Patient is on atorvastatin 10mg at home; target LDL <70 for secondary stroke prevention - agree that in this 89-year-old patient, the benefit is unclear  [] Lipid panel, A1c  [] Elective TTE without bubble (can be performed outpatient) - if within goals of care  [] Stroke education provided  [] Neurochecks, vitals at least q4h  [] Fall, seizure, aspiration precautions  [] GOC discussion was personally had with family - they would like to pursue neurologic workup inpatient including vEEG and MRI, if deemed appropriate by Neurology team    To be seen by attending in the AM with attestation to follow. Plan is not formalized until attending attestation is complete. Recommendations were relayed directly to admitting hospitalist. Note delayed d/t emergent patient care. TOM MORGAN is a 89y RIGHT handed woman, with PMH significant for dementia, depression, hypothyroidism, HLD, HTN, who presents to [facility] ED on 3/21/2024, with c/o an episode of unresponsiveness, worsening mentation.    ED vitals notable for: afebrile, HR 44-68, /82, RR 15-18, SpO2 % on RA, NSR on telemetry. Labs notable for: Hg 11.1, .6, TSH 3.39, troponin T 57->51, carbon dioxide 21, AG 15, BUN 29. CT imaging disclosed: as above - subacute to chronic appearing infarct in the bilateral caudate heads and left lenticulostriate nucleus, multifocal stenosis on CTA head.     NIHSS: 6 (+2 questions, +2 aphasia, +2 dysarthria)  LKN: 4/21/2024, time unknown  pre-MRS: 3    No tenecteplase d/t outside therapeutic window.  No thrombectomy d/t no LVO.    Impression:   1) Episode of unresponsiveness of unclear etiology, without clear return to baseline; consider: seizure - focal onset impaired awareness, hypersomnolence of Lewy Body dementia, TME - but no obvious trigger, stroke (not favored)  2) Acute to subacute encephalopathy over the course of several weeks i/s/o history of dementia with behavioral disturbance  3) Incidental finding of chronic infarcts on CTH. There is multifocal stenosis on CTA head.    Recommendations:  [] Telemetry monitoring  [] vEEG - to evaluate for focal slowing, epileptiform discharges, or seizures (event capture)  [] Hold off on ASMs for now   [] MRI brain w/wo contrast  [] TME labs to assess for reversible causes of encephalopathy (likely low-yield): ESR, CRP, vitamin B1/B6/B12/D, folate, ammonia, CK  [] Given concern for LBD - would avoid use of antipsychotic medications if possible - quetiapine (if patient can take PO) likely least offensive option  [] If there is no medical contraindication - patient should be started on aspirin 81mg daily for secondary stroke prevention given the finding of chronic infarcts on CTH  [] Patient is on atorvastatin 10mg at home; target LDL <70 for secondary stroke prevention - agree that in this 89-year-old patient, the benefit is unclear  [] Lipid panel, A1c  [] Elective TTE without bubble (can be performed outpatient) - if within goals of care  [] Stroke education provided  [] Neurochecks, vitals at least q4h  [] Fall, seizure, aspiration precautions  [] GOC discussion was personally had with family - they would like to pursue neurologic workup inpatient including vEEG and MRI, if deemed appropriate by Neurology team    To be seen by attending in the AM with attestation to follow. Plan is not formalized until attending attestation is complete. Recommendations were relayed directly to admitting hospitalist. Note delayed d/t emergent patient care. TOM MORGAN is a 89y RIGHT handed woman, with PMH significant for dementia, depression, hypothyroidism, HLD, HTN, who presents to [facility] ED on 3/21/2024, with c/o an episode of unresponsiveness, worsening mentation.    ED vitals notable for: afebrile, HR 44-68, /82, RR 15-18, SpO2 % on RA, NSR on telemetry. Labs notable for: Hg 11.1, .6, TSH 3.39, troponin T 57->51, carbon dioxide 21, AG 15, BUN 29. CT imaging disclosed: as above - subacute to chronic appearing infarct in the bilateral caudate heads and left lenticulostriate nucleus, multifocal stenosis on CTA head.     NIHSS: 6 (+2 questions, +2 aphasia, +2 dysarthria)  LKN: 4/21/2024, time unknown  pre-MRS: 3    No tenecteplase d/t outside therapeutic window.  No thrombectomy d/t no LVO.    Impression:   1) Episode of unresponsiveness of unclear etiology, without clear return to baseline; consider: seizure - focal onset impaired awareness, hypersomnolence of Lewy body dementia, TME - but no obvious trigger, stroke (not favored)  2) Acute to subacute encephalopathy over the course of several weeks i/s/o history of dementia with behavioral disturbance  3) Incidental finding of chronic infarcts on CTH. There is multifocal stenosis on CTA head.    Recommendations:  [] Telemetry monitoring  [] vEEG - to evaluate for focal slowing, epileptiform discharges, or seizures (event capture)  [] Hold off on ASMs for now   [] MRI brain w/wo contrast  [] TME labs to assess for reversible causes of encephalopathy (likely low-yield): ESR, CRP, vitamin B1/B6/B12/D, folate, ammonia, CK  [] Given concern for LBD - would avoid use of antipsychotic medications if possible - quetiapine (if patient can take PO) likely least offensive option  [] If there is no medical contraindication - patient should be started on aspirin 81mg daily for secondary stroke prevention given the finding of chronic infarcts on CTH  [] Patient is on atorvastatin 10mg at home; target LDL <70 for secondary stroke prevention - agree that in this 89-year-old patient, the benefit is unclear  [] Lipid panel, A1c  [] Elective TTE without bubble (can be performed outpatient) - if within goals of care  [] Stroke education provided  [] Neurochecks, vitals at least q4h  [] Fall, seizure, aspiration precautions  [] GOC discussion was personally had with family - they would like to pursue neurologic workup inpatient including vEEG and MRI, if deemed appropriate by Neurology team    To be seen by attending in the AM with attestation to follow. Plan is not formalized until attending attestation is complete. Recommendations were relayed directly to admitting hospitalist. Note delayed d/t emergent patient care.

## 2024-04-21 NOTE — H&P ADULT - PROBLEM SELECTOR PLAN 1
Episode of slumping over and not responding at table on 4/21 at 911am, no prior episodes, no seizure history.  Noted to have relative hypotension and bradycardia.  Recent addition of trazodone 4 wks prior and risperidone low dose 1 wk prior.  - seen by EP, monitor on tele, K 4 and Mg 2 goal, holding BB from home; per my d/w family would not want pacing or PPM if indicated as goal is to forego pain and suffering or invasive procedures per her wishes  - seen by neuro, CTA H&N w/ chronic CVA and intracranial stenosis, per neuro rec EEG and MRI, family OK to attempt these less invasive studies   - c/w tele for now  - holding BP meds as baseline prior SBP was 180s and now 110 this am---possible relative hypotension and intracranial stenosis leading to underperfusion of brain--will monitor trend and reinstate meds as tolerated with higher BP goal Episode of slumping over and not responding at table on 4/21 at 911am, no prior episodes, no seizure history.  Noted to have relative hypotension and bradycardia.  Recent addition of trazodone 4 wks prior and risperidone low dose 1 wk prior.  - seen by EP, monitor on tele, K 4 and Mg 2 goal, holding BB from home; per my d/w family would not want pacing or PPM if indicated as goal is to forego pain and suffering or invasive procedures per her wishes  - seen by neuro, CTA H&N w/ chronic CVA and intracranial stenosis, per neuro rec EEG and MRI, family OK to attempt these less invasive studies   - c/w tele for now  - holding BP meds as baseline prior SBP was 180s and now 110 this am---possible relative hypotension and intracranial stenosis leading to underperfusion of brain--will monitor trend and reinstate meds as tolerated with higher BP goal  - mild BUN elevation and Ca may suggest dehydration, will given 500 cc LR, check orthostatic when able Episode of slumping over and not responding at table on 4/21 at 911am, no prior episodes, no seizure history.  Noted to have relative hypotension and bradycardia.  Recent addition of trazodone 4 wks prior and risperidone low dose 1 wk prior.  - seen by EP, monitor on tele, K 4 and Mg 2 goal, holding BB from home; per my d/w family would not want pacing or PPM if indicated as goal is to forego pain and suffering or invasive procedures per her wishes  - seen by neuro, CTA H&N w/ chronic CVA and intracranial stenosis; cannot r/o CVA vs seizure at this time,  per neuro rec EEG and MRI, family OK to attempt these less invasive studies   - c/w tele for now  - holding BP meds as baseline prior SBP was 180s and now 110 this am---possible relative hypotension and intracranial stenosis leading to underperfusion of brain--will monitor trend and reinstate meds as tolerated with higher BP goal  - mild BUN elevation and Ca may suggest dehydration, will given 500 cc LR, check orthostatic when able - dysphagia screed and start diet if possible (baseline regular, regular consistency liquids)

## 2024-04-21 NOTE — GOALS OF CARE CONVERSATION - ADVANCED CARE PLANNING - CONVERSATION DETAILS
Met with family (son Bert in [place] and daughter Shakira from [place]) at bedside. Their father/her  also with dementia. Mom currently with confusion and baseline dementia cannot participate in conversation.  Per children mom had expressed her wishes long ago to be DNR/DNI and not suffer, also no feeding tubes or HD.   Report they have HCP forms but no on them.   Discussed mom currently stable.  Inquired re: goals should mom have symptomatic bradycardia while here requiring pacing, state that would be OK, advised if requires that may mean she would need a PPM, asked if that is something she would be OK with, both siblings agree she would have not have wanted a PPM.  Thus decided pacing would not be helpful if needed given no plan for more permanent solution.    Also no feeding tubes or HD.  Would be OK with IVF and antibiotics.  OK with neurology recs to pursue cause of unresponsiveness with recommended EEG and MRI (prior MRI, no metal in body).       MOLST completed with wishes  RN in ED to witness Met with family (son Bert in [place] and daughter Shakira from [place]) at bedside. Their father/her  also with dementia. Mom currently with confusion and baseline dementia cannot participate in conversation.  Per children mom had expressed her wishes long ago to be DNR/DNI and not suffer, also no feeding tubes or HD.   Report they have HCP forms but not on them.   Discussed mom currently stable.  Inquired re: goals should mom have symptomatic bradycardia while here requiring pacing, state that would be OK, advised if requires that may mean she would need a PPM, asked if that is something she would be OK with, both siblings agree she would have not have wanted a PPM.  Thus decided pacing would not be helpful if needed given no plan for more permanent solution.  Would be OK with IVF and antibiotics.  OK with neurology recs to pursue cause of unresponsiveness with recommended EEG and MRI (prior MRI, no metal in body).       MOLST completed with wishes  RN in ED to witness

## 2024-04-21 NOTE — ED ADULT NURSE REASSESSMENT NOTE - NS ED NURSE REASSESS COMMENT FT1
Break RN: received report from SAPNA Brown. pt @ CT. family at bedside. pending ua sample.
Patient is resting comfortably at this time, NAD, RR even and unlabored, no c/o pain, NSR on CM, daughter at bedside, pending urine at this time, plan of care reviewed, safety maintained, will continue to monitor patient
Report received from day RN Negra Pt A&ox3, on room air coming to ED c/o weakness. Pt history of HLD, HTN, hypothyroid. Pt with no acute changes at this time. Neuro intact, no deficits noted, no facia droop, no slurred speech, ROM/sensory intact. NSR on cardiac monitor. Pt respirations even and unlabored, chest rise and fall equal b/l. Pt denies chest pain, HA, SOB, dizziness, N/V/D, fever/chills. Right 20g patent, no redness or swelling site. Pt medicated as ordered. Stretcher in lowest position, call bell within reach, pt safety maintained.

## 2024-04-21 NOTE — CONSULT NOTE ADULT - SUBJECTIVE AND OBJECTIVE BOX
Neurology - Consult Note    -  Spectra: [phone] ([facility]), [phone] ([facility]). For new consults, please page: [phone] ([facility]), [phone] ([facility]).  -    HPI: Patient TOM MORGAN is a 89y (19-Apr-1929) *** handed wo/man who presents to *** ED on ***, with c/o ***.    PMH significant for: ***    Review of Systems:  INCOMPLETE   All other review of systems is negative unless indicated above.    Allergies:  No Known Allergies      PMHx/PSHx/Family Hx: As above, otherwise see below   HTN (hypertension)    HLD (hyperlipidemia)    Hypothyroid    H/O: depression        Social Hx:  Per HPI    Medications:  MEDICATIONS  (STANDING):    MEDICATIONS  (PRN):      Vitals:  T(C): 36.4 (04-21-24 @ 10:04), Max: 36.4 (04-21-24 @ 10:04)  HR: 68 (04-21-24 @ 12:30) (44 - 68)  BP: 146/64 (04-21-24 @ 12:30) (112/44 - 146/64)  RR: 15 (04-21-24 @ 12:30) (15 - 18)  SpO2: 96% (04-21-24 @ 12:30) (96% - 100%)    Physical Examination: INCOMPLETE  General - Sitting up on ED cart  Cardiovascular - No LE edema  Eyes - Fundoscopy not performed due to safety precautions in the setting of infection risk, non-injected conjunctivae, anicteric sclerae    Neurologic Exam:  Mental status:  - Awake, Alert  - Oriented to: person, place, and time  - Speech: fluent  - Repetition and naming: intact   - Follows simple and complex commands   - Attention/concentration: intact  - Recent and remote memory (including registration and recall): registration intact, 3/3 on 3-word recall  - Fund of knowledge: intact    Cranial nerves - PERRL, VFF on confrontational testing, EOMI - no nystagmus, face sensation (V1-V3) intact b/l, facial strength intact without asymmetry b/l, hearing intact b/l with finger rub test, palate with symmetric elevation, shoulder shrug intact b/l, tongue midline on protrusion with full lateral movement  Dysarthria: ***    Motor - Normal bulk and tone throughout. No pronator drift.  Strength testing (R/L)  Deltoid:  5/5  Biceps:  5/5        Triceps:  5/5       Wrist Extension:  5/5      Wrist Flexion:  5/5       Interossei:  5/5        :  5/5    Hip Flexion:  5/5  Hip Extension:  5/5      Knee Flexion:  5/5      Knee Extension:  5/5      Dorsiflexion:  5/5      Plantar Flexion:  5/5    Sensation - Light touch/vibration intact throughout    DTRs (R/L)  Biceps:  2+/2+        Triceps:  2+/2+       Brachioradialis:  2+/2+        Patellar:  2+/2+      Ankle:  2+/2+      Plantar response:  Down/Down    Coordination - Finger to Nose, Heel to Shin intact b/l. No tremors appreciated.    Gait and station - Unable to assess d/t fall risk/safety concerns.    Labs:                        11.1   4.81  )-----------( 158      ( 21 Apr 2024 10:43 )             33.1     04-21    141  |  105  |  29<H>  ----------------------------<  112<H>  4.5   |  21<L>  |  1.18    Ca    10.6<H>      21 Apr 2024 10:43  Mg     1.50     04-21    TPro  7.2  /  Alb  4.1  /  TBili  0.5  /  DBili  x   /  AST  36<H>  /  ALT  13  /  AlkPhos  36<L>  04-21    CAPILLARY BLOOD GLUCOSE      POCT Blood Glucose.: 123 mg/dL (21 Apr 2024 10:34)    LIVER FUNCTIONS - ( 21 Apr 2024 10:43 )  Alb: 4.1 g/dL / Pro: 7.2 g/dL / ALK PHOS: 36 U/L / ALT: 13 U/L / AST: 36 U/L / GGT: x             PT/INR - ( 21 Apr 2024 10:43 )   PT: 11.7 sec;   INR: 1.05 ratio         PTT - ( 21 Apr 2024 10:43 )  PTT:32.0 sec  CSF:                  Radiology:     Neurology - Consult Note    -  Spectra: [phone] ([facility]), [phone] ([facility]). For new consults, please page: [phone] ([facility]), [phone] ([facility]).  -    HPI: Patient TMO MORGAN is a 89y (19-Apr-1929) RIGHT handed woman who presents to [facility] ED on 3/21/2024, with c/o an episode  .    PMH significant for: ***    Review of Systems:  INCOMPLETE   All other review of systems is negative unless indicated above.    Allergies:  No Known Allergies      PMHx/PSHx/Family Hx: As above, otherwise see below   HTN (hypertension)    HLD (hyperlipidemia)    Hypothyroid    H/O: depression        Social Hx:  Per HPI    Medications:  MEDICATIONS  (STANDING):    MEDICATIONS  (PRN):      Vitals:  T(C): 36.4 (04-21-24 @ 10:04), Max: 36.4 (04-21-24 @ 10:04)  HR: 68 (04-21-24 @ 12:30) (44 - 68)  BP: 146/64 (04-21-24 @ 12:30) (112/44 - 146/64)  RR: 15 (04-21-24 @ 12:30) (15 - 18)  SpO2: 96% (04-21-24 @ 12:30) (96% - 100%)    Physical Examination: INCOMPLETE  General - Sitting up on ED cart  Cardiovascular - No LE edema  Eyes - Fundoscopy not performed due to safety precautions in the setting of infection risk, non-injected conjunctivae, anicteric sclerae    Neurologic Exam:  Mental status:  - Awake, Alert  - Oriented to: person, place, and time  - Speech: fluent  - Repetition and naming: intact   - Follows simple and complex commands   - Attention/concentration: intact  - Recent and remote memory (including registration and recall): registration intact, 3/3 on 3-word recall  - Fund of knowledge: intact    Cranial nerves - PERRL, VFF on confrontational testing, EOMI - no nystagmus, face sensation (V1-V3) intact b/l, facial strength intact without asymmetry b/l, hearing intact b/l with finger rub test, palate with symmetric elevation, shoulder shrug intact b/l, tongue midline on protrusion with full lateral movement  Dysarthria: ***    Motor - Normal bulk and tone throughout. No pronator drift.  Strength testing (R/L)  Deltoid:  5/5  Biceps:  5/5        Triceps:  5/5       Wrist Extension:  5/5      Wrist Flexion:  5/5       Interossei:  5/5        :  5/5    Hip Flexion:  5/5  Hip Extension:  5/5      Knee Flexion:  5/5      Knee Extension:  5/5      Dorsiflexion:  5/5      Plantar Flexion:  5/5    Sensation - Light touch/vibration intact throughout    DTRs (R/L)  Biceps:  2+/2+        Triceps:  2+/2+       Brachioradialis:  2+/2+        Patellar:  2+/2+      Ankle:  2+/2+      Plantar response:  Down/Down    Coordination - Finger to Nose, Heel to Shin intact b/l. No tremors appreciated.    Gait and station - Unable to assess d/t fall risk/safety concerns.    Labs:                        11.1   4.81  )-----------( 158      ( 21 Apr 2024 10:43 )             33.1     04-21    141  |  105  |  29<H>  ----------------------------<  112<H>  4.5   |  21<L>  |  1.18    Ca    10.6<H>      21 Apr 2024 10:43  Mg     1.50     04-21    TPro  7.2  /  Alb  4.1  /  TBili  0.5  /  DBili  x   /  AST  36<H>  /  ALT  13  /  AlkPhos  36<L>  04-21    CAPILLARY BLOOD GLUCOSE      POCT Blood Glucose.: 123 mg/dL (21 Apr 2024 10:34)    LIVER FUNCTIONS - ( 21 Apr 2024 10:43 )  Alb: 4.1 g/dL / Pro: 7.2 g/dL / ALK PHOS: 36 U/L / ALT: 13 U/L / AST: 36 U/L / GGT: x             PT/INR - ( 21 Apr 2024 10:43 )   PT: 11.7 sec;   INR: 1.05 ratio         PTT - ( 21 Apr 2024 10:43 )  PTT:32.0 sec  CSF:                  Radiology:  CT ANGIO NECK (W)AW IC  CT ANGIO BRAIN (W)AW IC    PROCEDURE DATE:  04/21/2024      INTERPRETATION:  EXAM: CT OF THE HEAD WITH AND WITHOUT CONTRAST, CTA HEAD   AND NECK    HISTORY: Unresponsive for 15 minutes, r/o Stroke    TECHNIQUE: CT of the head was obtained from the skull base to the skull   vertex with and without intravenous contrast. CTA of the head and neck   was acquired from the lung apices to the skull vertex. Sagittal and   coronal reconstructions were subsequently conducted. Omnipaque 350   Intravenous contrast was administered. 2-D MIP images were provided.    CONTRAST: Omnipaque 350: 90 cc administered, 10 cc discarded.    COMPARISON: No prior studies are available for comparison.    FINDINGS:    CT HEAD:  No acute intracranial hemorrhage. Areas of decreased attenuation in the   deep and periventricular white matter, compatible with chronic small   vessel disease. Subacute to chronic appearing infarct in the bilateral   caudate heads and left lenticulostriate nucleus. Mild parenchymal volume   loss. No hydrocephalus. The extra-axial spaces and basal cisterns are   within normal limits. No midline shift or mass effect present. No gross   abnormal enhancement within the brain parenchyma.    The cranial cervical junction is within normal limits. The sella is not   expanded. No depressed calvarial fracture. Mucosal thickening in the   ethmoid air cells and left maxillary sinus. Attenuating material within   the left maxillary sinus, likely inspissated secretions versus a chronic   fungal sinusitis. The visualized mastoid air cells are well aerated. The   visualized orbits are status post cataract surgery.    CTA BRAIN:  The intracranial segments of the bilateral ICAs opacify normally with   intraluminal contrast. Atherosclerotic ossification throughout the   intracranial segments of the ICAs, resulting in mild luminal narrowing.    Multifocal severe stenosis in the left A2 segment, image 76 of series   605. Distal reconstitution of the left AMADEO. Moderate to severe focal   narrowing in the proximal right M1 segment, image 271 of series 1001 and   image 11 of series 3. Mild luminal narrowing along the course of the   inferior division of the right M2/M3 segment, image 57 of series 605. The   right AMADEO and left MCA appear within normal limits. The anterior   communicating artery is unremarkable.    The bilateral vertebral arteries opacify normally with intraluminal   contrast. The left vertebral artery is mildly dominant. Mild luminal   narrowing in the basilar artery. Moderate luminal narrowing in the right   P2 segments, image 401 of series 3. The proximal SCAs and left PCA are   within normal limits.    Limited evaluation of the dural based sinuses.    CTA NECK:  Limited evaluation of the proximal vessels of the neck due to streak   artifact.    There is a three-vessel arch. The common carotid arteries opacify   normally with intraluminal contrast. Mild atherosclerotic calcification   about the bilateral carotid bulbs. The bilateral ICAs opacify normally   with intraluminal contrast to the skull base.    The vertebral arteries have normal origins. The left vertebral artery is   mildly dominant. The vertebral arteries opacify normally with   intraluminal contrast to the skull base.    Nodular calcification in the right lobe of the thyroid.    Multilevel degenerative change of the cervical spine, most significantly   at the C4-C5 level where there is severe narrowing of the spinal canal.    0.4 cm nodule in the right lung apex.    IMPRESSION:    CT HEAD:  1.  No evidence of acute intracranial hemorrhage or midline shift.  2.  Subacute to chronic appearing infarcts in the bilateral basal ganglia.  3.  Chronic small vessel disease.    CTA BRAIN:  1.  Multifocal severe stenosis in the left A2 segment with distal   reconstitution.  2.  Moderate to severe focal narrowing in the proximal right M1 segment.  3.  Other focal mild to moderate luminal narrowings in the major   intracranial vasculature as discussed above.    CTA NECK:  1.  No evidence of critical stenosis by NASCET criteria.  2.  Nodular calcification in the right lobe of the thyroid. Recommend   nonemergent thyroid ultrasound for further characterization if clinically   indicated.  3.  The level degenerative change of the cervical spine, most   significantly at the C4-C5 level where there is severe narrowing of the   spinal canal. Recommend MRI of the cervical spine for further evaluation.  4.  Subcentimeter nodule in the right lung apex. Recommend follow-up   imaging as per Fleischner criteria. Neurology - Consult Note    -  Spectra: [phone] ([facility]), [phone] ([facility]). For new consults, please page: [phone] ([facility]), [phone] ([facility]).  -    HPI: Patient TOM MORGAN is a 89y (19-Apr-1929) RIGHT handed woman who presents to [facility] ED on 3/21/2024, with c/o an episode of unresponsiveness, worsening mentation.    PMH significant for: dementia, depression, hypothyroidism, HLD, HTN    Daughter and son are at bedside. Patient's primary language is Chinese, but she is a fluent English speaker. At baseline, per daughter, she is able to discuss complex topics in English.    Son is a primary caregiver for patient. She lives with her  and they have 24/7 aides who help them at home. There are cameras in the home, which family uses to keep an eye on patient and her . This AM - patient woke up in her USOH. Left her bedroom and went to her 's room. Turned on the music and waited for him to wake-up. This is her normal routine. Aide eventually took  to the dining room. Patient made coffee for herself and tea for her . Went to the dining room. Aide thought patient looked "sluggish" and helped her to the dining room table. Patient slumped over at the table and became seemingly unresponsive. Family rushed over to the house when called by aide. Patient's eyes were open, but she was not speaking other than "one word or so." She did appear to attend to family when they called her name. Episode lasted about 15 minutes. EMS was called - patient brought to the ED. Slowly improved, however not back to her baseline. Electrophysiology consulted given patient was bradycardic in the ED (to at least HR 44). Per ED, Electrophysiology did not feel bradycardia was the cause of patient's symptoms.    Patient has no known history of seizures. Family says speech has gotten progressively worse over time. Patient has always been bad with pronouns. However, she has been substituting nouns and has been getting worse (see exam for examples). Her speech, even prior to this event, has often been nonsensical. Patient has been hallucinating things in her ED room (tells daughter there are things she needs to put away), thinks her granddaughter is outside the room, she is working on imaginary sewing projects in the room (does do sewing at home). She has recently been started on risperidone 0.125mg in the AM for hallucinations/perceptual abnormalities and behavioral disturbance. Per family - patient can say "nasty and sarcastic" things. Patient on trazodone 50mg nightly for sleep. Follows with a geriatrician outpatient.    Family denies history of stroke/MI that they are aware. No AC/AP. Uses a walker at baseline. Cannot climb stairs. Non-smoker. No EtOH use. Lives with her /aides.     No recent illness. No sick contacts. Chronic migraines her whole adult life. Chronic neck pain in her adult life.    Review of Systems:  MITCH d/t mental status, dementia    Allergies:  No Known Allergies      PMHx/PSHx/Family Hx: As above, otherwise see below   HTN (hypertension)    HLD (hyperlipidemia)    Hypothyroid    H/O: depression        Social Hx:  Per HPI    Medications:  MEDICATIONS  (STANDING):    MEDICATIONS  (PRN):      Vitals:  T(C): 36.4 (04-21-24 @ 10:04), Max: 36.4 (04-21-24 @ 10:04)  HR: 68 (04-21-24 @ 12:30) (44 - 68)  BP: 146/64 (04-21-24 @ 12:30) (112/44 - 146/64)  RR: 15 (04-21-24 @ 12:30) (15 - 18)  SpO2: 96% (04-21-24 @ 12:30) (96% - 100%)    Physical Examination: Limited by patient participation  General - Lying supine on ED cart, appears stated age, in NAD, minimal ability to participate in exam  Cardiovascular - Pigmentation/texture changes in the LE c/w stasis changes  Eyes - non-injected conjunctivae, anicteric sclerae    Neurologic Exam:  Mental status:  - Awake, but closes eyes throughout interview  - Oriented to: When asked her name - she states her 's name, only states her own name after significant prompting by her family; knows she is in the hospital, does not know the date/year  - Speech: seemingly fluent (?word salad - she is not intelligible)  - Repetition: impaired  - Naming: calls a pen a "marking," calls paper an "envelope" - this is apparently an ongoing deficit for patient (not new)  - Follows simple commands    Cranial nerves - PERRL - appear surgical, BTT b/l, EOMI - no nystagmus, facial sensation equivocal (not reliable), facial strength grossly intact without asymmetry b/l, hearing grossly intact b/l, palate elevation - MITCH, shoulder shrug intact b/l, tongue on protrusion - MITCH  Dysarthria: Worsened per family - very difficult to understand her speech    Motor - Normal bulk throughout for age. She exhibits mild paratonia in the UEs. No definitive cogwheel rigidity appreciated.  Strength testing (R/L) - limited ability to participate in MMT  UEs are roughly 5/5 b/l - symmetrical    Hip Flexion:  roughly 4+ b/l    Knee Flexion:  at least 3/5 b/l    Knee Extension:  at least 3/5 b/l       Plantar Flexion:  5/5    Sensation - Light touch intact throughout    DTRs (R/L)  Biceps:  3+/3+        Triceps:  3+/3+       Brachioradialis:  3+/3+        Patellar:  Unable to elicit  Ankle:  Unable to elicit  Plantar response:  Withdraw b/l    Coordination - Finger to Nose grossly intact b/l within the constraints of patient's ability to perform maneuver (does not quite follow instructions).    Gait and station - Unable to assess d/t fall risk/safety concerns.    Labs:                        11.1   4.81  )-----------( 158      ( 21 Apr 2024 10:43 )             33.1     04-21    141  |  105  |  29<H>  ----------------------------<  112<H>  4.5   |  21<L>  |  1.18    Ca    10.6<H>      21 Apr 2024 10:43  Mg     1.50     04-21    TPro  7.2  /  Alb  4.1  /  TBili  0.5  /  DBili  x   /  AST  36<H>  /  ALT  13  /  AlkPhos  36<L>  04-21    CAPILLARY BLOOD GLUCOSE      POCT Blood Glucose.: 123 mg/dL (21 Apr 2024 10:34)    LIVER FUNCTIONS - ( 21 Apr 2024 10:43 )  Alb: 4.1 g/dL / Pro: 7.2 g/dL / ALK PHOS: 36 U/L / ALT: 13 U/L / AST: 36 U/L / GGT: x             PT/INR - ( 21 Apr 2024 10:43 )   PT: 11.7 sec;   INR: 1.05 ratio         PTT - ( 21 Apr 2024 10:43 )  PTT:32.0 sec  CSF:                  Radiology:  CT ANGIO NECK (W)AW IC  CT ANGIO BRAIN (W)AW IC    PROCEDURE DATE:  04/21/2024      INTERPRETATION:  EXAM: CT OF THE HEAD WITH AND WITHOUT CONTRAST, CTA HEAD   AND NECK    HISTORY: Unresponsive for 15 minutes, r/o Stroke    TECHNIQUE: CT of the head was obtained from the skull base to the skull   vertex with and without intravenous contrast. CTA of the head and neck   was acquired from the lung apices to the skull vertex. Sagittal and   coronal reconstructions were subsequently conducted. Omnipaque 350   Intravenous contrast was administered. 2-D MIP images were provided.    CONTRAST: Omnipaque 350: 90 cc administered, 10 cc discarded.    COMPARISON: No prior studies are available for comparison.    FINDINGS:    CT HEAD:  No acute intracranial hemorrhage. Areas of decreased attenuation in the   deep and periventricular white matter, compatible with chronic small   vessel disease. Subacute to chronic appearing infarct in the bilateral   caudate heads and left lenticulostriate nucleus. Mild parenchymal volume   loss. No hydrocephalus. The extra-axial spaces and basal cisterns are   within normal limits. No midline shift or mass effect present. No gross   abnormal enhancement within the brain parenchyma.    The cranial cervical junction is within normal limits. The sella is not   expanded. No depressed calvarial fracture. Mucosal thickening in the   ethmoid air cells and left maxillary sinus. Attenuating material within   the left maxillary sinus, likely inspissated secretions versus a chronic   fungal sinusitis. The visualized mastoid air cells are well aerated. The   visualized orbits are status post cataract surgery.    CTA BRAIN:  The intracranial segments of the bilateral ICAs opacify normally with   intraluminal contrast. Atherosclerotic ossification throughout the   intracranial segments of the ICAs, resulting in mild luminal narrowing.    Multifocal severe stenosis in the left A2 segment, image 76 of series   605. Distal reconstitution of the left AMADEO. Moderate to severe focal   narrowing in the proximal right M1 segment, image 271 of series 1001 and   image 11 of series 3. Mild luminal narrowing along the course of the   inferior division of the right M2/M3 segment, image 57 of series 605. The   right AMADEO and left MCA appear within normal limits. The anterior   communicating artery is unremarkable.    The bilateral vertebral arteries opacify normally with intraluminal   contrast. The left vertebral artery is mildly dominant. Mild luminal   narrowing in the basilar artery. Moderate luminal narrowing in the right   P2 segments, image 401 of series 3. The proximal SCAs and left PCA are   within normal limits.    Limited evaluation of the dural based sinuses.    CTA NECK:  Limited evaluation of the proximal vessels of the neck due to streak   artifact.    There is a three-vessel arch. The common carotid arteries opacify   normally with intraluminal contrast. Mild atherosclerotic calcification   about the bilateral carotid bulbs. The bilateral ICAs opacify normally   with intraluminal contrast to the skull base.    The vertebral arteries have normal origins. The left vertebral artery is   mildly dominant. The vertebral arteries opacify normally with   intraluminal contrast to the skull base.    Nodular calcification in the right lobe of the thyroid.    Multilevel degenerative change of the cervical spine, most significantly   at the C4-C5 level where there is severe narrowing of the spinal canal.    0.4 cm nodule in the right lung apex.    IMPRESSION:    CT HEAD:  1.  No evidence of acute intracranial hemorrhage or midline shift.  2.  Subacute to chronic appearing infarcts in the bilateral basal ganglia.  3.  Chronic small vessel disease.    CTA BRAIN:  1.  Multifocal severe stenosis in the left A2 segment with distal   reconstitution.  2.  Moderate to severe focal narrowing in the proximal right M1 segment.  3.  Other focal mild to moderate luminal narrowings in the major   intracranial vasculature as discussed above.    CTA NECK:  1.  No evidence of critical stenosis by NASCET criteria.  2.  Nodular calcification in the right lobe of the thyroid. Recommend   nonemergent thyroid ultrasound for further characterization if clinically   indicated.  3.  The level degenerative change of the cervical spine, most   significantly at the C4-C5 level where there is severe narrowing of the   spinal canal. Recommend MRI of the cervical spine for further evaluation.  4.  Subcentimeter nodule in the right lung apex. Recommend follow-up   imaging as per Fleischner criteria. Neurology - Consult Note    -  Spectra: [phone] ([facility]), [phone] ([facility]). For new consults, please page: [phone] ([facility]), [phone] ([facility]).  -    HPI: Patient TOM MORGAN is a 89y (19-Apr-1929) RIGHT handed woman who presents to [facility] ED on 3/21/2024, with c/o an episode of unresponsiveness, worsening mentation.    PMH significant for: dementia, depression, hypothyroidism, HLD, HTN    Daughter and son are at bedside. Patient's primary language is Chinese, but she is a fluent English speaker. At baseline, per daughter, she is able to discuss complex topics in English.    Son is a primary caregiver for patient. She lives with her  and they have 24/7 aides who help them at home. There are cameras in the home, which family uses to keep an eye on patient and her . This AM - patient woke up in her USOH. Left her bedroom and went to her 's room. Turned on the music and waited for him to wake-up. This is her normal routine. Aide eventually took  to the dining room. Patient made coffee for herself and tea for her . Went to the dining room. Aide thought patient looked "sluggish" and helped her to the dining room table. Patient slumped over at the table and became seemingly unresponsive. Family rushed over to the house when called by aide. Patient's eyes were open, but she was not speaking other than "one word or so." She did appear to attend to family when they called her name. Episode lasted about 15 minutes. EMS was called - patient brought to the ED. Slowly improved, however not back to her baseline. Electrophysiology consulted given patient was bradycardic in the ED (to at least HR 44). Per ED, Electrophysiology did not feel bradycardia was the cause of patient's symptoms.    Patient has no known history of seizures. Family says speech has gotten progressively worse over time. Patient has always been bad with pronouns. However, she has been substituting nouns and has been getting worse (see exam for examples). Her speech, even prior to this event, has often been nonsensical. Patient has been hallucinating things in her ED room (tells daughter there are things she needs to put away), thinks her granddaughter is outside the room, she is working on imaginary sewing projects in the room (does do sewing at home). She has recently been started on risperidone 0.125mg in the AM for hallucinations/perceptual abnormalities and behavioral disturbance. Per family - patient can say "nasty and sarcastic" things. Patient on trazodone 50mg nightly for sleep. Follows with a geriatrician outpatient.    Family denies history of stroke/MI that they are aware. No AC/AP. Uses a walker at baseline. Cannot climb stairs. Non-smoker. No EtOH use. Lives with her /aides.     No recent illness. No sick contacts. Chronic migraines her whole adult life. Chronic neck pain in her adult life.    Review of Systems:  MITCH d/t mental status, dementia    Allergies:  No Known Allergies      PMHx/PSHx/Family Hx: As above, otherwise see below   HTN (hypertension)    HLD (hyperlipidemia)    Hypothyroid    H/O: depression        Social Hx:  Per HPI    Medications:  MEDICATIONS  (STANDING):    MEDICATIONS  (PRN):      Vitals:  T(C): 36.4 (04-21-24 @ 10:04), Max: 36.4 (04-21-24 @ 10:04)  HR: 68 (04-21-24 @ 12:30) (44 - 68)  BP: 146/64 (04-21-24 @ 12:30) (112/44 - 146/64)  RR: 15 (04-21-24 @ 12:30) (15 - 18)  SpO2: 96% (04-21-24 @ 12:30) (96% - 100%)    Physical Examination: Limited by patient participation  General - Lying supine on ED cart, appears stated age, in NAD, minimal ability to participate in exam  Cardiovascular - Pigmentation/texture changes in the LE c/w stasis changes  Eyes - non-injected conjunctivae, anicteric sclerae    Neurologic Exam:  Mental status:  - Awake, but closes eyes throughout interview (?apraxia of eyelid opening)  - Oriented to: When asked her name - she states her 's name, only states her own name after significant prompting by her family; knows she is in the hospital, does not know the date/year  - Speech: seemingly fluent (?word salad - she is not intelligible)  - Repetition: impaired  - Naming: calls a pen a "marking," calls paper an "envelope" - this is apparently an ongoing deficit for patient (not new)  - Follows simple commands    Cranial nerves - PERRL - appear surgical, BTT b/l, EOMI - no nystagmus, facial sensation equivocal (not reliable), facial strength grossly intact without asymmetry b/l, hearing grossly intact b/l, palate elevation - MITCH, shoulder shrug intact b/l, tongue on protrusion - MITCH  Dysarthria: Worsened per family - very difficult to understand her speech    Motor - Normal bulk throughout for age. She exhibits mild paratonia in the UEs. No definitive cogwheel rigidity appreciated.  Strength testing (R/L) - limited ability to participate in MMT  UEs are roughly 5/5 b/l - symmetrical    Hip Flexion:  roughly 4+ b/l    Knee Flexion:  at least 3/5 b/l    Knee Extension:  at least 3/5 b/l       Plantar Flexion:  5/5    Sensation - Light touch intact throughout    DTRs (R/L)  Biceps:  3+/3+        Triceps:  3+/3+       Brachioradialis:  3+/3+        Patellar:  Unable to elicit  Ankle:  Unable to elicit  Plantar response:  Withdraw b/l    Coordination - Finger to Nose grossly intact b/l within the constraints of patient's ability to perform maneuver (does not quite follow instructions).    Gait and station - Unable to assess d/t fall risk/safety concerns.    Labs:                        11.1   4.81  )-----------( 158      ( 21 Apr 2024 10:43 )             33.1     04-21    141  |  105  |  29<H>  ----------------------------<  112<H>  4.5   |  21<L>  |  1.18    Ca    10.6<H>      21 Apr 2024 10:43  Mg     1.50     04-21    TPro  7.2  /  Alb  4.1  /  TBili  0.5  /  DBili  x   /  AST  36<H>  /  ALT  13  /  AlkPhos  36<L>  04-21    CAPILLARY BLOOD GLUCOSE      POCT Blood Glucose.: 123 mg/dL (21 Apr 2024 10:34)    LIVER FUNCTIONS - ( 21 Apr 2024 10:43 )  Alb: 4.1 g/dL / Pro: 7.2 g/dL / ALK PHOS: 36 U/L / ALT: 13 U/L / AST: 36 U/L / GGT: x             PT/INR - ( 21 Apr 2024 10:43 )   PT: 11.7 sec;   INR: 1.05 ratio         PTT - ( 21 Apr 2024 10:43 )  PTT:32.0 sec  CSF:                  Radiology:  CT ANGIO NECK (W)AW IC  CT ANGIO BRAIN (W)AW IC    PROCEDURE DATE:  04/21/2024      INTERPRETATION:  EXAM: CT OF THE HEAD WITH AND WITHOUT CONTRAST, CTA HEAD   AND NECK    HISTORY: Unresponsive for 15 minutes, r/o Stroke    TECHNIQUE: CT of the head was obtained from the skull base to the skull   vertex with and without intravenous contrast. CTA of the head and neck   was acquired from the lung apices to the skull vertex. Sagittal and   coronal reconstructions were subsequently conducted. Omnipaque 350   Intravenous contrast was administered. 2-D MIP images were provided.    CONTRAST: Omnipaque 350: 90 cc administered, 10 cc discarded.    COMPARISON: No prior studies are available for comparison.    FINDINGS:    CT HEAD:  No acute intracranial hemorrhage. Areas of decreased attenuation in the   deep and periventricular white matter, compatible with chronic small   vessel disease. Subacute to chronic appearing infarct in the bilateral   caudate heads and left lenticulostriate nucleus. Mild parenchymal volume   loss. No hydrocephalus. The extra-axial spaces and basal cisterns are   within normal limits. No midline shift or mass effect present. No gross   abnormal enhancement within the brain parenchyma.    The cranial cervical junction is within normal limits. The sella is not   expanded. No depressed calvarial fracture. Mucosal thickening in the   ethmoid air cells and left maxillary sinus. Attenuating material within   the left maxillary sinus, likely inspissated secretions versus a chronic   fungal sinusitis. The visualized mastoid air cells are well aerated. The   visualized orbits are status post cataract surgery.    CTA BRAIN:  The intracranial segments of the bilateral ICAs opacify normally with   intraluminal contrast. Atherosclerotic ossification throughout the   intracranial segments of the ICAs, resulting in mild luminal narrowing.    Multifocal severe stenosis in the left A2 segment, image 76 of series   605. Distal reconstitution of the left AMADEO. Moderate to severe focal   narrowing in the proximal right M1 segment, image 271 of series 1001 and   image 11 of series 3. Mild luminal narrowing along the course of the   inferior division of the right M2/M3 segment, image 57 of series 605. The   right AMADEO and left MCA appear within normal limits. The anterior   communicating artery is unremarkable.    The bilateral vertebral arteries opacify normally with intraluminal   contrast. The left vertebral artery is mildly dominant. Mild luminal   narrowing in the basilar artery. Moderate luminal narrowing in the right   P2 segments, image 401 of series 3. The proximal SCAs and left PCA are   within normal limits.    Limited evaluation of the dural based sinuses.    CTA NECK:  Limited evaluation of the proximal vessels of the neck due to streak   artifact.    There is a three-vessel arch. The common carotid arteries opacify   normally with intraluminal contrast. Mild atherosclerotic calcification   about the bilateral carotid bulbs. The bilateral ICAs opacify normally   with intraluminal contrast to the skull base.    The vertebral arteries have normal origins. The left vertebral artery is   mildly dominant. The vertebral arteries opacify normally with   intraluminal contrast to the skull base.    Nodular calcification in the right lobe of the thyroid.    Multilevel degenerative change of the cervical spine, most significantly   at the C4-C5 level where there is severe narrowing of the spinal canal.    0.4 cm nodule in the right lung apex.    IMPRESSION:    CT HEAD:  1.  No evidence of acute intracranial hemorrhage or midline shift.  2.  Subacute to chronic appearing infarcts in the bilateral basal ganglia.  3.  Chronic small vessel disease.    CTA BRAIN:  1.  Multifocal severe stenosis in the left A2 segment with distal   reconstitution.  2.  Moderate to severe focal narrowing in the proximal right M1 segment.  3.  Other focal mild to moderate luminal narrowings in the major   intracranial vasculature as discussed above.    CTA NECK:  1.  No evidence of critical stenosis by NASCET criteria.  2.  Nodular calcification in the right lobe of the thyroid. Recommend   nonemergent thyroid ultrasound for further characterization if clinically   indicated.  3.  The level degenerative change of the cervical spine, most   significantly at the C4-C5 level where there is severe narrowing of the   spinal canal. Recommend MRI of the cervical spine for further evaluation.  4.  Subcentimeter nodule in the right lung apex. Recommend follow-up   imaging as per Fleischner criteria.

## 2024-04-21 NOTE — H&P ADULT - NSHPSOCIALHISTORY_GEN_ALL_CORE
never smoker, no drinking or drugs  has 4 kids (1 ),  but  with dementia (more functionally limited)   lives at home,  aides  walks with walker

## 2024-04-21 NOTE — H&P ADULT - PROBLEM SELECTOR PLAN 4
- holding amlodipine, HCTZ, losartan and metoprolol - holding amlodipine, HCTZ, losartan and metoprolol  - monitor BP and reintroduce slowly, given CVA not r/o permissive HTN would be indicated
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
## 2024-04-21 NOTE — CONSULT NOTE ADULT - ASSESSMENT
Mrs. Patino is a 89yoF presenting to ED with AMS. EP consulted for Bradycardia.     #AMS  #Bradycardia    AMS event reported by son/witness as period of lethargy and non-verbal but no syncope. She was blinking and tracking throughout the event. The event lasted ~15min and during that time vitals revealed HR: 40's, 's systolic. She is on betablockers and her son reports that she is normally bradycardic though he doesn't know her recent HR trends. After the event ended the patient did not recover completely and has a prolonged period of confusion/lethargy.    ECG here reveals Sinus cookie without ischemic features.   Labs show normal perfusion markers.   Trop elevated to 57.   TSH WNL    Overall I think this event is more likely neurologic in etiology then cardiac. Given her normal BP throughout the event, AMS rather than true syncope, normal perfusion markers after 15minutes of AMS, and prolonged confusion/lethargy after the event.     #Bradycardia  -Tele  -Strict lytes K:4-5, M-3  -Stop Av-ang blockers  -investigate neurologic etiology of AMS

## 2024-04-21 NOTE — CONSULT NOTE ADULT - ATTENDING COMMENTS
Mrs. Patino is a 89yoF presenting to ED with AMS. EP consulted for Bradycardia. History is suggestive of CVA.  Patient to undergo CTH and be placed on telemetry.
Exam:  MS: sleeping, arouable to voice.  Oriented to self.  Asks me for my business card as we are saying good bye to her.    Cogwheel rigidity in arms and legs.  Mild bradykinesia.     A/P  Ms. Patino is a 89 yo woman with a h/o dementia with episode of prolonged unresponsiveness.   The differential diagnosis includes: focal nonmotor onset with impaired awareness seizure, toxic metabolic septic encephalopathy, related to underlying dementia.  Continuous EEG.   We do not recommend starting antiseizure medications at this time.   Thank you

## 2024-04-22 ENCOUNTER — APPOINTMENT (OUTPATIENT)
Dept: GERIATRICS | Facility: CLINIC | Age: 89
End: 2024-04-22

## 2024-04-22 LAB
FOLATE SERPL-MCNC: >20 NG/ML — HIGH (ref 3.1–17.5)
VIT B12 SERPL-MCNC: 1080 PG/ML — HIGH (ref 200–900)

## 2024-04-22 PROCEDURE — 99231 SBSQ HOSP IP/OBS SF/LOW 25: CPT

## 2024-04-22 PROCEDURE — 99222 1ST HOSP IP/OBS MODERATE 55: CPT

## 2024-04-22 RX ORDER — HALOPERIDOL DECANOATE 100 MG/ML
0.5 INJECTION INTRAMUSCULAR ONCE
Refills: 0 | Status: COMPLETED | OUTPATIENT
Start: 2024-04-22 | End: 2024-04-22

## 2024-04-22 RX ORDER — TRAZODONE HCL 50 MG
50 TABLET ORAL ONCE
Refills: 0 | Status: COMPLETED | OUTPATIENT
Start: 2024-04-22 | End: 2024-04-22

## 2024-04-22 RX ORDER — HALOPERIDOL DECANOATE 100 MG/ML
1 INJECTION INTRAMUSCULAR ONCE
Refills: 0 | Status: COMPLETED | OUTPATIENT
Start: 2024-04-22 | End: 2024-04-22

## 2024-04-22 RX ADMIN — Medication 6 MILLIGRAM(S): at 21:41

## 2024-04-22 RX ADMIN — Medication 25 MICROGRAM(S): at 06:31

## 2024-04-22 RX ADMIN — Medication 50 MILLIGRAM(S): at 01:22

## 2024-04-22 RX ADMIN — ATORVASTATIN CALCIUM 10 MILLIGRAM(S): 80 TABLET, FILM COATED ORAL at 21:41

## 2024-04-22 RX ADMIN — HEPARIN SODIUM 5000 UNIT(S): 5000 INJECTION INTRAVENOUS; SUBCUTANEOUS at 06:31

## 2024-04-22 RX ADMIN — Medication 1 MILLIGRAM(S): at 13:25

## 2024-04-22 RX ADMIN — HEPARIN SODIUM 5000 UNIT(S): 5000 INJECTION INTRAVENOUS; SUBCUTANEOUS at 17:40

## 2024-04-22 RX ADMIN — HALOPERIDOL DECANOATE 1 MILLIGRAM(S): 100 INJECTION INTRAMUSCULAR at 05:12

## 2024-04-22 RX ADMIN — HALOPERIDOL DECANOATE 0.5 MILLIGRAM(S): 100 INJECTION INTRAMUSCULAR at 02:11

## 2024-04-22 RX ADMIN — Medication 81 MILLIGRAM(S): at 13:26

## 2024-04-22 NOTE — CONSULT NOTE ADULT - NS ATTEND AMEND GEN_ALL_CORE FT
Patient seen and examined. Agree with plan as detailed in PA/NP Note.     Continue to hold BB  MRI pending    Scotty Baker MD  Pager: [phone]

## 2024-04-22 NOTE — PHYSICAL THERAPY INITIAL EVALUATION ADULT - ADDITIONAL COMMENTS
Patient is poor historian. Per children at bedside, patient lives in a house with her .  has advanced dementia. 24/7 home health aides. Patient ambulates with rolling walker, uses rollator outdoors. Per children, patient can manage most ADLs independently.     Patient left semi-supine in NAD, +lines intact, +bed alarm, +call bell. Children at bedside. Vitals stable. RN Ld aware of session details.

## 2024-04-22 NOTE — PROGRESS NOTE ADULT - SUBJECTIVE AND OBJECTIVE BOX
Patient is a 89y old  Female who presents with a chief complaint of unresponsive episode, bradycardia (21 Apr 2024 18:52)    Date of servie : 04-22-24 @ 14:59  INTERVAL HPI/OVERNIGHT EVENTS:  T(C): 36.8 (04-22-24 @ 09:00), Max: 36.8 (04-22-24 @ 09:00)  HR: 93 (04-22-24 @ 09:00) (64 - 97)  BP: 152/69 (04-22-24 @ 09:00) (150/70 - 161/72)  RR: 16 (04-22-24 @ 09:00) (12 - 18)  SpO2: 99% (04-22-24 @ 09:00) (98% - 100%)  Wt(kg): --  I&O's Summary      LABS:                        11.1   4.81  )-----------( 158      ( 21 Apr 2024 10:43 )             33.1     04-21    141  |  105  |  29<H>  ----------------------------<  112<H>  4.5   |  21<L>  |  1.18    Ca    10.6<H>      21 Apr 2024 10:43  Mg     1.50     04-21    TPro  7.2  /  Alb  4.1  /  TBili  0.5  /  DBili  x   /  AST  36<H>  /  ALT  13  /  AlkPhos  36<L>  04-21    PT/INR - ( 21 Apr 2024 10:43 )   PT: 11.7 sec;   INR: 1.05 ratio         PTT - ( 21 Apr 2024 10:43 )  PTT:32.0 sec  Urinalysis Basic - ( 21 Apr 2024 10:43 )    Color: x / Appearance: x / SG: x / pH: x  Gluc: 112 mg/dL / Ketone: x  / Bili: x / Urobili: x   Blood: x / Protein: x / Nitrite: x   Leuk Esterase: x / RBC: x / WBC x   Sq Epi: x / Non Sq Epi: x / Bacteria: x      CAPILLARY BLOOD GLUCOSE            Urinalysis Basic - ( 21 Apr 2024 10:43 )    Color: x / Appearance: x / SG: x / pH: x  Gluc: 112 mg/dL / Ketone: x  / Bili: x / Urobili: x   Blood: x / Protein: x / Nitrite: x   Leuk Esterase: x / RBC: x / WBC x   Sq Epi: x / Non Sq Epi: x / Bacteria: x        MEDICATIONS  (STANDING):  aspirin enteric coated 81 milliGRAM(s) Oral daily  atorvastatin 10 milliGRAM(s) Oral at bedtime  folic acid 1 milliGRAM(s) Oral daily  heparin   Injectable 5000 Unit(s) SubCutaneous every 12 hours  levothyroxine 25 MICROGram(s) Oral daily  melatonin 6 milliGRAM(s) Oral <User Schedule>    MEDICATIONS  (PRN):  acetaminophen     Tablet .. 650 milliGRAM(s) Oral every 6 hours PRN Temp greater or equal to 38C (100.4F), Mild Pain (1 - 3)          PHYSICAL EXAM:  GENERAL: NAD, well-groomed, well-developed  HEAD:  Atraumatic, Normocephalic  CHEST/LUNG: Clear to percussion bilaterally; No rales, rhonchi, wheezing, or rubs  HEART: Regular rate and rhythm; No murmurs, rubs, or gallops  ABDOMEN: Soft, Nontender, Nondistended; Bowel sounds present  EXTREMITIES:  2+ Peripheral Pulses, No clubbing, cyanosis, or edema  LYMPH: No lymphadenopathy noted  SKIN: No rashes or lesions    Care Discussed with Consultants/Other Providers [ ] YES  [ ] NO

## 2024-04-22 NOTE — PHYSICAL THERAPY INITIAL EVALUATION ADULT - GENERAL OBSERVATIONS, REHAB EVAL
Patient received semi-supine in NAD, +telemetry, +bed alarm, agreeable to participate. Son and daughter at bedside. HR 82 BPM.
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
## 2024-04-22 NOTE — PROGRESS NOTE ADULT - SUBJECTIVE AND OBJECTIVE BOX
Patient laying in bed quietly. Son and daughter at bedside.   Patient has some recollection of why she is in the hospital but later did not recall where she was.  +Tremulous.   Denies chest pain, shortness of breath, palpitations or lightheadedness. Remembers an odd feeling in her head yesterday.   Patient currently in NSR 80's.   As per son. patient not at baseline. Typically independent.    Vital Signs Last 24 Hrs  T(C): 36.8 (22 Apr 2024 09:00), Max: 36.8 (22 Apr 2024 09:00)  T(F): 98.2 (22 Apr 2024 09:00), Max: 98.2 (22 Apr 2024 09:00)  HR: 93 (22 Apr 2024 09:00) (64 - 97)  BP: 152/69 (22 Apr 2024 09:00) (150/70 - 161/72)  BP(mean): --  RR: 16 (22 Apr 2024 09:00) (12 - 18)  SpO2: 99% (22 Apr 2024 09:00) (98% - 100%)    Parameters below as of 22 Apr 2024 09:00  Patient On (Oxygen Delivery Method): room air      EKG: Sinus bradycardia 44 bpm  QRSd 74ms  QTc 398ms  Telemetry: Normal sinus rhythm with occasional PVC's.     MEDICATIONS  (STANDING):  aspirin enteric coated 81 milliGRAM(s) Oral daily  atorvastatin 10 milliGRAM(s) Oral at bedtime  folic acid 1 milliGRAM(s) Oral daily  heparin   Injectable 5000 Unit(s) SubCutaneous every 12 hours  levothyroxine 25 MICROGram(s) Oral daily  melatonin 6 milliGRAM(s) Oral <User Schedule>    MEDICATIONS  (PRN):  acetaminophen     Tablet .. 650 milliGRAM(s) Oral every 6 hours PRN Temp greater or equal to 38C (100.4F), Mild Pain (1 - 3)          Physical exam:   Gen- patient awake, confused at times Oriented to name but not date or place. Tremulous   Resp- poor effort. No wheezing, rales or rhonchi  CV- S1 and S2 RRR. No murmurs, gallops or rubs  ABD- soft nontender + bowel sounds  EXT- no edema. Lower leg hyperpigmentation. No ulcerations. Left knee tender to ROM.   Neuro- patient confused. Not at baseline. +tremor (new). Left arm weakness.                             11.1   4.81  )-----------( 158      ( 21 Apr 2024 10:43 )             33.1     PT/INR - ( 21 Apr 2024 10:43 )   PT: 11.7 sec;   INR: 1.05 ratio         PTT - ( 21 Apr 2024 10:43 )  PTT:32.0 sec  04-21    141  |  105  |  29<H>  ----------------------------<  112<H>  4.5   |  21<L>  |  1.18    Ca    10.6<H>      21 Apr 2024 10:43  Mg     1.50     04-21    TPro  7.2  /  Alb  4.1  /  TBili  0.5  /  DBili  x   /  AST  36<H>  /  ALT  13  /  AlkPhos  36<L>  04-21    CARDIAC MARKERS ( 21 Apr 2024 13:51 )  x     / x     / 118 U/L / x     / x                  Assessment and Plan:         Patient laying in bed quietly. Son and daughter at bedside.   Patient has some recollection of why she is in the hospital but later did not recall where she was.  +Tremulous.   Denies chest pain, shortness of breath, palpitations or lightheadedness. Remembers an odd feeling in her head yesterday.   Patient currently in NSR 80's.   As per son. patient not at baseline. Typically independent.    Vital Signs Last 24 Hrs  T(C): 36.8 (22 Apr 2024 09:00), Max: 36.8 (22 Apr 2024 09:00)  T(F): 98.2 (22 Apr 2024 09:00), Max: 98.2 (22 Apr 2024 09:00)  HR: 93 (22 Apr 2024 09:00) (64 - 97)  BP: 152/69 (22 Apr 2024 09:00) (150/70 - 161/72)  BP(mean): --  RR: 16 (22 Apr 2024 09:00) (12 - 18)  SpO2: 99% (22 Apr 2024 09:00) (98% - 100%)    Parameters below as of 22 Apr 2024 09:00  Patient On (Oxygen Delivery Method): room air      EKG: Sinus bradycardia 44 bpm  QRSd 74ms  QTc 398ms  Telemetry: Normal sinus rhythm with occasional PVC's.     MEDICATIONS  (STANDING):  aspirin enteric coated 81 milliGRAM(s) Oral daily  atorvastatin 10 milliGRAM(s) Oral at bedtime  folic acid 1 milliGRAM(s) Oral daily  heparin   Injectable 5000 Unit(s) SubCutaneous every 12 hours  levothyroxine 25 MICROGram(s) Oral daily  melatonin 6 milliGRAM(s) Oral <User Schedule>    MEDICATIONS  (PRN):  acetaminophen     Tablet .. 650 milliGRAM(s) Oral every 6 hours PRN Temp greater or equal to 38C (100.4F), Mild Pain (1 - 3)          Physical exam:   Gen- patient awake, confused at times Oriented to name but not date or place. Tremulous   Resp- poor effort. No wheezing, rales or rhonchi  CV- S1 and S2 RRR. No murmurs, gallops or rubs  ABD- soft nontender + bowel sounds  EXT- no edema. Lower leg hyperpigmentation. No ulcerations. Left knee tender to ROM.   Neuro- patient confused. Not at baseline. +tremor (new). Left arm weakness.                             11.1   4.81  )-----------( 158      ( 21 Apr 2024 10:43 )             33.1     PT/INR - ( 21 Apr 2024 10:43 )   PT: 11.7 sec;   INR: 1.05 ratio         PTT - ( 21 Apr 2024 10:43 )  PTT:32.0 sec  04-21    141  |  105  |  29<H>  ----------------------------<  112<H>  4.5   |  21<L>  |  1.18    Ca    10.6<H>      21 Apr 2024 10:43  Mg     1.50     04-21    TPro  7.2  /  Alb  4.1  /  TBili  0.5  /  DBili  x   /  AST  36<H>  /  ALT  13  /  AlkPhos  36<L>  04-21    CARDIAC MARKERS ( 21 Apr 2024 13:51 )  x     / x     / 118 U/L / x     / x          INTERPRETATION:  EXAM: CT OF THE HEAD WITH AND WITHOUT CONTRAST, CTA HEAD   AND NECK    HISTORY: Unresponsive for 15 minutes, r/o Stroke    TECHNIQUE: CT of the head was obtained from the skull base to the skull   vertex with and without intravenous contrast. CTA of the head and neck   was acquired from the lung apices to the skull vertex. Sagittal and   coronal reconstructions were subsequently conducted. Omnipaque 350   Intravenous contrast was administered. 2-D MIP images were provided.    CONTRAST: Omnipaque 350: 90 cc administered, 10 cc discarded.    COMPARISON: No prior studies are available for comparison.    FINDINGS:    CT HEAD:  No acute intracranial hemorrhage. Areas of decreased attenuation in the   deep and periventricular white matter, compatible with chronic small   vessel disease. Subacute to chronic appearing infarct in the bilateral   caudate heads and left lenticulostriate nucleus. Mild parenchymal volume   loss. No hydrocephalus. The extra-axial spaces and basal cisterns are   within normal limits. No midline shift or mass effect present. No gross   abnormal enhancement within the brain parenchyma.    The cranial cervical junction is within normal limits. The sella is not   expanded. No depressed calvarial fracture. Mucosal thickening in the   ethmoid air cells and left maxillary sinus. Attenuating material within   the left maxillary sinus, likely inspissated secretions versus a chronic   fungal sinusitis. The visualized mastoid air cells are well aerated. The   visualized orbits are status post cataract surgery.    CTA BRAIN:  The intracranial segments of the bilateral ICAs opacify normally with   intraluminal contrast. Atherosclerotic ossification throughout the   intracranial segments of the ICAs, resulting in mild luminal narrowing.    Multifocal severe stenosis in the left A2 segment, image 76 of series   605. Distal reconstitution of the left AMADEO. Moderate to severe focal   narrowing in the proximal right M1 segment, image 271 of series 1001 and   image 11 of series 3. Mild luminal narrowing along the course of the   inferior division of the right M2/M3 segment, image 57 of series 605. The   right AMADEO and left MCA appear within normal limits. The anterior   communicating artery is unremarkable.    The bilateral vertebral arteries opacify normally with intraluminal   contrast. The left vertebral artery is mildly dominant. Mild luminal   narrowing in the basilar artery. Moderate luminal narrowing in the right   P2 segments, image 401 of series 3. The proximal SCAs and left PCA are   within normal limits.    Limited evaluation of the dural based sinuses.    CTA NECK:  Limited evaluation of the proximal vessels of the neck due to streak   artifact.    There is a three-vessel arch. The common carotid arteries opacify   normally with intraluminal contrast. Mild atherosclerotic calcification   about the bilateral carotid bulbs. The bilateral ICAs opacify normally   with intraluminal contrast to the skull base.    The vertebral arteries have normal origins. The left vertebral artery is   mildly dominant. The vertebral arteries opacify normally with   intraluminal contrast to the skull base.    Nodular calcification in the right lobe of the thyroid.    Multilevel degenerative change of the cervical spine, most significantly   at the C4-C5 level where there is severe narrowing of the spinal canal.    0.4 cm nodule in the right lung apex.      IMPRESSION:    CT HEAD:  1.  No evidence of acute intracranial hemorrhage or midline shift.  2.  Subacute to chronic appearing infarcts in the bilateral basal ganglia.  3.  Chronic small vessel disease.    CTA BRAIN:  1.  Multifocal severe stenosis in the left A2 segment with distal   reconstitution.  2.  Moderate to severe focal narrowing in the proximal right M1 segment.  3.  Other focal mild to moderate luminal narrowings in the major   intracranial vasculature as discussed above.    CTA NECK:  1.  No evidence of critical stenosis by NASCET criteria.  2.  Nodular calcification in the right lobe of the thyroid. Recommend   nonemergent thyroid ultrasound for further characterization if clinically   indicated.  3.  The level degenerative change of the cervical spine, most   significantly at the C4-C5 level where there is severe narrowing of the   spinal canal. Recommend MRI of the cervical spine for further evaluation.  4.  Subcentimeter nodule in the right lung apex. Recommend follow-up   imaging as per Fleischner criteria.    --- End of Report ---  ALFONZO ARAGON MD; Attending Radiologist

## 2024-04-22 NOTE — PATIENT PROFILE ADULT - MEDICATIONS/VISITS
PASSED per protocol, refill sent.     Last PE--- 11--jl      Future Appointments   Date Time Provider [facility] [place]   10/24/2022  7:30 AM John Vogt MD [facility]   11/30/2022  8:00 AM Valdo Simpson MD [facility]
no

## 2024-04-22 NOTE — PATIENT PROFILE ADULT - FALL HARM RISK - OOB REASON
Pt was at work and spilled boiling water onto his left foot.  Pt removed sock and shoe of left foot and soaked in ice water bath.  Pt given 50mcg of fentanyl enroute by ems.  [id number]  
May forget or chooses not to call

## 2024-04-22 NOTE — PATIENT PROFILE ADULT - FALL HARM RISK - HARM RISK INTERVENTIONS
Assistance with ambulation/Assistance OOB with selected safe patient handling equipment/Communicate Risk of Fall with Harm to all staff/Discuss with provider need for PT consult/Monitor gait and stability/Provide patient with walking aids - walker, cane, crutches/Reinforce activity limits and safety measures with patient and family/Tailored Fall Risk Interventions/Use of alarms - bed, chair and/or voice tab/Visual Cue: Yellow wristband and red socks/Bed in lowest position, wheels locked, appropriate side rails in place/Call bell, personal items and telephone in reach/Instruct patient to call for assistance before getting out of bed or chair/Non-slip footwear when patient is out of bed/[place] to call system/Physically safe environment - no spills, clutter or unnecessary equipment/Purposeful Proactive Rounding/Room/bathroom lighting operational, light cord in reach

## 2024-04-22 NOTE — PHYSICAL THERAPY INITIAL EVALUATION ADULT - PERTINENT HX OF CURRENT PROBLEM, REHAB EVAL
89 year old female with dementia (A&Ox2, walks with walker, eats regular food) from home with 24/7 private hire aides, HTN, HLD, hypothyroid, recent onset of delusions who presents with unresponsive episode 4/21 at 911am while sitting down for am breakfast noted to have relative hypotension and bradycardia, admitted for further eval.

## 2024-04-22 NOTE — PHYSICAL THERAPY INITIAL EVALUATION ADULT - LEVEL OF INDEPENDENCE: GAIT, REHAB EVAL
Patient with poor standing static balance, retropulsion, and not following commands./unable to perform

## 2024-04-23 LAB
ANION GAP SERPL CALC-SCNC: 12 MMOL/L — SIGNIFICANT CHANGE UP (ref 7–14)
ANION GAP SERPL CALC-SCNC: 19 MMOL/L — HIGH (ref 7–14)
BUN SERPL-MCNC: 26 MG/DL — HIGH (ref 7–23)
BUN SERPL-MCNC: 29 MG/DL — HIGH (ref 7–23)
CALCIUM SERPL-MCNC: 9.3 MG/DL — SIGNIFICANT CHANGE UP (ref 8.4–10.5)
CALCIUM SERPL-MCNC: 9.7 MG/DL — SIGNIFICANT CHANGE UP (ref 8.4–10.5)
CHLORIDE SERPL-SCNC: 105 MMOL/L — SIGNIFICANT CHANGE UP (ref 98–107)
CHLORIDE SERPL-SCNC: 109 MMOL/L — HIGH (ref 98–107)
CO2 SERPL-SCNC: 21 MMOL/L — LOW (ref 22–31)
CO2 SERPL-SCNC: 22 MMOL/L — SIGNIFICANT CHANGE UP (ref 22–31)
CREAT SERPL-MCNC: 0.84 MG/DL — SIGNIFICANT CHANGE UP (ref 0.5–1.3)
CREAT SERPL-MCNC: 0.92 MG/DL — SIGNIFICANT CHANGE UP (ref 0.5–1.3)
EGFR: 57 ML/MIN/1.73M2 — LOW
EGFR: 64 ML/MIN/1.73M2 — SIGNIFICANT CHANGE UP
GLUCOSE SERPL-MCNC: 89 MG/DL — SIGNIFICANT CHANGE UP (ref 70–99)
GLUCOSE SERPL-MCNC: 91 MG/DL — SIGNIFICANT CHANGE UP (ref 70–99)
HCT VFR BLD CALC: 35.5 % — SIGNIFICANT CHANGE UP (ref 34.5–45)
HGB BLD-MCNC: 12.4 G/DL — SIGNIFICANT CHANGE UP (ref 11.5–15.5)
MAGNESIUM SERPL-MCNC: 1.7 MG/DL — SIGNIFICANT CHANGE UP (ref 1.6–2.6)
MAGNESIUM SERPL-MCNC: 1.7 MG/DL — SIGNIFICANT CHANGE UP (ref 1.6–2.6)
MCHC RBC-ENTMCNC: 33.8 PG — SIGNIFICANT CHANGE UP (ref 27–34)
MCHC RBC-ENTMCNC: 34.9 GM/DL — SIGNIFICANT CHANGE UP (ref 32–36)
MCV RBC AUTO: 96.7 FL — SIGNIFICANT CHANGE UP (ref 80–100)
NRBC # BLD: 0 /100 WBCS — SIGNIFICANT CHANGE UP (ref 0–0)
NRBC # FLD: 0 K/UL — SIGNIFICANT CHANGE UP (ref 0–0)
PHOSPHATE SERPL-MCNC: 1.1 MG/DL — LOW (ref 2.5–4.5)
PHOSPHATE SERPL-MCNC: 1.8 MG/DL — LOW (ref 2.5–4.5)
PLATELET # BLD AUTO: 191 K/UL — SIGNIFICANT CHANGE UP (ref 150–400)
POTASSIUM SERPL-MCNC: 2.6 MMOL/L — CRITICAL LOW (ref 3.5–5.3)
POTASSIUM SERPL-MCNC: 3.9 MMOL/L — SIGNIFICANT CHANGE UP (ref 3.5–5.3)
POTASSIUM SERPL-SCNC: 2.6 MMOL/L — CRITICAL LOW (ref 3.5–5.3)
POTASSIUM SERPL-SCNC: 3.9 MMOL/L — SIGNIFICANT CHANGE UP (ref 3.5–5.3)
RBC # BLD: 3.67 M/UL — LOW (ref 3.8–5.2)
RBC # FLD: 12.8 % — SIGNIFICANT CHANGE UP (ref 10.3–14.5)
SODIUM SERPL-SCNC: 143 MMOL/L — SIGNIFICANT CHANGE UP (ref 135–145)
SODIUM SERPL-SCNC: 145 MMOL/L — SIGNIFICANT CHANGE UP (ref 135–145)
VIT D25+D1,25 OH+D1,25 PNL SERPL-MCNC: 31.4 PG/ML — SIGNIFICANT CHANGE UP (ref 19.9–79.3)
WBC # BLD: 9.22 K/UL — SIGNIFICANT CHANGE UP (ref 3.8–10.5)
WBC # FLD AUTO: 9.22 K/UL — SIGNIFICANT CHANGE UP (ref 3.8–10.5)

## 2024-04-23 PROCEDURE — 99223 1ST HOSP IP/OBS HIGH 75: CPT

## 2024-04-23 PROCEDURE — 95720 EEG PHY/QHP EA INCR W/VEEG: CPT

## 2024-04-23 RX ORDER — POTASSIUM CHLORIDE 20 MEQ
10 PACKET (EA) ORAL
Refills: 0 | Status: COMPLETED | OUTPATIENT
Start: 2024-04-23 | End: 2024-04-23

## 2024-04-23 RX ORDER — INFLUENZA VIRUS VACCINE 15; 15; 15; 15 UG/.5ML; UG/.5ML; UG/.5ML; UG/.5ML
0.7 SUSPENSION INTRAMUSCULAR ONCE
Refills: 0 | Status: DISCONTINUED | OUTPATIENT
Start: 2024-04-23 | End: 2024-05-02

## 2024-04-23 RX ORDER — POTASSIUM CHLORIDE 20 MEQ
40 PACKET (EA) ORAL EVERY 4 HOURS
Refills: 0 | Status: COMPLETED | OUTPATIENT
Start: 2024-04-23 | End: 2024-04-23

## 2024-04-23 RX ORDER — LEVOTHYROXINE SODIUM 125 MCG
1 TABLET ORAL
Refills: 0 | DISCHARGE

## 2024-04-23 RX ORDER — AMLODIPINE BESYLATE 2.5 MG/1
1 TABLET ORAL
Refills: 0 | DISCHARGE

## 2024-04-23 RX ORDER — LOSARTAN POTASSIUM 100 MG/1
50 TABLET, FILM COATED ORAL DAILY
Refills: 0 | Status: DISCONTINUED | OUTPATIENT
Start: 2024-04-23 | End: 2024-05-02

## 2024-04-23 RX ORDER — ATORVASTATIN CALCIUM 80 MG/1
1 TABLET, FILM COATED ORAL
Refills: 0 | DISCHARGE

## 2024-04-23 RX ORDER — GEMFIBROZIL 600 MG
1 TABLET ORAL
Refills: 0 | DISCHARGE

## 2024-04-23 RX ADMIN — Medication 100 MILLIEQUIVALENT(S): at 08:55

## 2024-04-23 RX ADMIN — Medication 81 MILLIGRAM(S): at 14:56

## 2024-04-23 RX ADMIN — Medication 25 MICROGRAM(S): at 04:25

## 2024-04-23 RX ADMIN — Medication 100 MILLIEQUIVALENT(S): at 10:23

## 2024-04-23 RX ADMIN — LOSARTAN POTASSIUM 50 MILLIGRAM(S): 100 TABLET, FILM COATED ORAL at 17:53

## 2024-04-23 RX ADMIN — HEPARIN SODIUM 5000 UNIT(S): 5000 INJECTION INTRAVENOUS; SUBCUTANEOUS at 17:46

## 2024-04-23 RX ADMIN — Medication 6 MILLIGRAM(S): at 21:10

## 2024-04-23 RX ADMIN — ATORVASTATIN CALCIUM 10 MILLIGRAM(S): 80 TABLET, FILM COATED ORAL at 21:10

## 2024-04-23 RX ADMIN — Medication 100 MILLIEQUIVALENT(S): at 12:18

## 2024-04-23 RX ADMIN — HEPARIN SODIUM 5000 UNIT(S): 5000 INJECTION INTRAVENOUS; SUBCUTANEOUS at 05:43

## 2024-04-23 RX ADMIN — Medication 40 MILLIEQUIVALENT(S): at 14:56

## 2024-04-23 RX ADMIN — Medication 40 MILLIEQUIVALENT(S): at 09:03

## 2024-04-23 RX ADMIN — Medication 1 MILLIGRAM(S): at 14:56

## 2024-04-23 NOTE — PROGRESS NOTE ADULT - SUBJECTIVE AND OBJECTIVE BOX
Patient is a 89y old  Female who presents with a chief complaint of unresponsive episode, bradycardia (22 Apr 2024 15:53)    Date of servie : 04-23-24 @ 11:27  INTERVAL HPI/OVERNIGHT EVENTS:  T(C): 36.5 (04-23-24 @ 05:40), Max: 36.5 (04-22-24 @ 19:10)  HR: 104 (04-23-24 @ 05:40) (96 - 104)  BP: 161/86 (04-23-24 @ 05:40) (144/88 - 161/86)  RR: 17 (04-23-24 @ 05:40) (17 - 17)  SpO2: 100% (04-23-24 @ 05:40) (98% - 100%)  Wt(kg): --  I&O's Summary      LABS:                        12.4   9.22  )-----------( 191      ( 23 Apr 2024 06:00 )             35.5     04-23    145  |  105  |  26<H>  ----------------------------<  89  2.6<LL>   |  21<L>  |  0.84    Ca    9.7      23 Apr 2024 06:00  Phos  1.8     04-23  Mg     1.70     04-23        Urinalysis Basic - ( 23 Apr 2024 06:00 )    Color: x / Appearance: x / SG: x / pH: x  Gluc: 89 mg/dL / Ketone: x  / Bili: x / Urobili: x   Blood: x / Protein: x / Nitrite: x   Leuk Esterase: x / RBC: x / WBC x   Sq Epi: x / Non Sq Epi: x / Bacteria: x      CAPILLARY BLOOD GLUCOSE            Urinalysis Basic - ( 23 Apr 2024 06:00 )    Color: x / Appearance: x / SG: x / pH: x  Gluc: 89 mg/dL / Ketone: x  / Bili: x / Urobili: x   Blood: x / Protein: x / Nitrite: x   Leuk Esterase: x / RBC: x / WBC x   Sq Epi: x / Non Sq Epi: x / Bacteria: x        MEDICATIONS  (STANDING):  aspirin enteric coated 81 milliGRAM(s) Oral daily  atorvastatin 10 milliGRAM(s) Oral at bedtime  folic acid 1 milliGRAM(s) Oral daily  heparin   Injectable 5000 Unit(s) SubCutaneous every 12 hours  influenza  Vaccine (HIGH DOSE) 0.7 milliLiter(s) IntraMuscular once  levothyroxine 25 MICROGram(s) Oral daily  melatonin 6 milliGRAM(s) Oral <User Schedule>  potassium chloride    Tablet ER 40 milliEquivalent(s) Oral every 4 hours  potassium chloride  10 mEq/100 mL IVPB 10 milliEquivalent(s) IV Intermittent every 1 hour    MEDICATIONS  (PRN):  acetaminophen     Tablet .. 650 milliGRAM(s) Oral every 6 hours PRN Temp greater or equal to 38C (100.4F), Mild Pain (1 - 3)          PHYSICAL EXAM:  GENERAL: NAD, well-groomed, well-developed  HEAD:  Atraumatic, Normocephalic  CHEST/LUNG: Clear to percussion bilaterally; No rales, rhonchi, wheezing, or rubs  HEART: Regular rate and rhythm; No murmurs, rubs, or gallops  ABDOMEN: Soft, Nontender, Nondistended; Bowel sounds present  EXTREMITIES:  2+ Peripheral Pulses, No clubbing, cyanosis, or edema  LYMPH: No lymphadenopathy noted  SKIN: No rashes or lesions    Care Discussed with Consultants/Other Providers [ ] YES  [ ] NO

## 2024-04-23 NOTE — BH CONSULTATION LIAISON ASSESSMENT NOTE - NSBHATTESTCOMMENTATTENDFT_PSY_A_CORE
Chart reviewed. Discussed with trainee. Agree with above assessment/recs. Will continue to assess on 4/24
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
## 2024-04-23 NOTE — BH CONSULTATION LIAISON ASSESSMENT NOTE - CURRENT MEDICATION
MEDICATIONS  (STANDING):  aspirin enteric coated 81 milliGRAM(s) Oral daily  atorvastatin 10 milliGRAM(s) Oral at bedtime  folic acid 1 milliGRAM(s) Oral daily  heparin   Injectable 5000 Unit(s) SubCutaneous every 12 hours  influenza  Vaccine (HIGH DOSE) 0.7 milliLiter(s) IntraMuscular once  levothyroxine 25 MICROGram(s) Oral daily  losartan 50 milliGRAM(s) Oral daily  melatonin 6 milliGRAM(s) Oral <User Schedule>    MEDICATIONS  (PRN):  acetaminophen     Tablet .. 650 milliGRAM(s) Oral every 6 hours PRN Temp greater or equal to 38C (100.4F), Mild Pain (1 - 3)

## 2024-04-23 NOTE — BH CONSULTATION LIAISON ASSESSMENT NOTE - NSBHCHARTREVIEWLAB_PSY_A_CORE FT
12.4   9.22  )-----------( 191      ( 23 Apr 2024 06:00 )             35.5     04-23    145  |  105  |  26<H>  ----------------------------<  89  2.6<LL>   |  21<L>  |  0.84    Ca    9.7      23 Apr 2024 06:00  Phos  1.8     04-23  Mg     1.70     04-23

## 2024-04-23 NOTE — CHART NOTE - NSCHARTNOTEFT_GEN_A_CORE
EEG preliminary read (not final) on the initial recording hour(s) = x 2    No seizures recorded.  Mild slowing noted, nonspecific.  Final report to follow tomorrow morning after completion of study.    [facility] EEG Reading Room Ph#: [phone]  Epilepsy Answering Service after 5PM and before 8:30AM: Ph#: [phone]

## 2024-04-23 NOTE — BH CONSULTATION LIAISON ASSESSMENT NOTE - NSBHCHARTREVIEWVS_PSY_A_CORE FT
Vital Signs Last 24 Hrs  T(C): 37.1 (23 Apr 2024 12:05), Max: 37.1 (23 Apr 2024 12:05)  T(F): 98.7 (23 Apr 2024 12:05), Max: 98.7 (23 Apr 2024 12:05)  HR: 82 (23 Apr 2024 12:05) (82 - 104)  BP: 123/60 (23 Apr 2024 12:05) (123/60 - 161/86)  BP(mean): --  RR: 18 (23 Apr 2024 12:05) (17 - 18)  SpO2: 100% (23 Apr 2024 12:05) (98% - 100%)    Parameters below as of 23 Apr 2024 12:05  Patient On (Oxygen Delivery Method): room air

## 2024-04-24 LAB
ANION GAP SERPL CALC-SCNC: 16 MMOL/L — HIGH (ref 7–14)
BUN SERPL-MCNC: 26 MG/DL — HIGH (ref 7–23)
CALCIUM SERPL-MCNC: 9.1 MG/DL — SIGNIFICANT CHANGE UP (ref 8.4–10.5)
CHLORIDE SERPL-SCNC: 111 MMOL/L — HIGH (ref 98–107)
CO2 SERPL-SCNC: 18 MMOL/L — LOW (ref 22–31)
CREAT SERPL-MCNC: 0.77 MG/DL — SIGNIFICANT CHANGE UP (ref 0.5–1.3)
EGFR: 71 ML/MIN/1.73M2 — SIGNIFICANT CHANGE UP
GLUCOSE SERPL-MCNC: 95 MG/DL — SIGNIFICANT CHANGE UP (ref 70–99)
HCT VFR BLD CALC: 35.5 % — SIGNIFICANT CHANGE UP (ref 34.5–45)
HGB BLD-MCNC: 12.2 G/DL — SIGNIFICANT CHANGE UP (ref 11.5–15.5)
MAGNESIUM SERPL-MCNC: 1.9 MG/DL — SIGNIFICANT CHANGE UP (ref 1.6–2.6)
MCHC RBC-ENTMCNC: 33.7 PG — SIGNIFICANT CHANGE UP (ref 27–34)
MCHC RBC-ENTMCNC: 34.4 GM/DL — SIGNIFICANT CHANGE UP (ref 32–36)
MCV RBC AUTO: 98.1 FL — SIGNIFICANT CHANGE UP (ref 80–100)
NRBC # BLD: 0 /100 WBCS — SIGNIFICANT CHANGE UP (ref 0–0)
NRBC # FLD: 0 K/UL — SIGNIFICANT CHANGE UP (ref 0–0)
PHOSPHATE SERPL-MCNC: 1.8 MG/DL — LOW (ref 2.5–4.5)
PLATELET # BLD AUTO: 168 K/UL — SIGNIFICANT CHANGE UP (ref 150–400)
POTASSIUM SERPL-MCNC: 3.8 MMOL/L — SIGNIFICANT CHANGE UP (ref 3.5–5.3)
POTASSIUM SERPL-SCNC: 3.8 MMOL/L — SIGNIFICANT CHANGE UP (ref 3.5–5.3)
PYRIDOXAL PHOS SERPL-MCNC: 9.2 UG/L — SIGNIFICANT CHANGE UP (ref 3.4–65.2)
RBC # BLD: 3.62 M/UL — LOW (ref 3.8–5.2)
RBC # FLD: 13.2 % — SIGNIFICANT CHANGE UP (ref 10.3–14.5)
SODIUM SERPL-SCNC: 145 MMOL/L — SIGNIFICANT CHANGE UP (ref 135–145)
WBC # BLD: 9.66 K/UL — SIGNIFICANT CHANGE UP (ref 3.8–10.5)
WBC # FLD AUTO: 9.66 K/UL — SIGNIFICANT CHANGE UP (ref 3.8–10.5)

## 2024-04-24 PROCEDURE — 99232 SBSQ HOSP IP/OBS MODERATE 35: CPT

## 2024-04-24 RX ORDER — OLANZAPINE 15 MG/1
1.25 TABLET, FILM COATED ORAL EVERY 6 HOURS
Refills: 0 | Status: DISCONTINUED | OUTPATIENT
Start: 2024-04-24 | End: 2024-04-26

## 2024-04-24 RX ADMIN — HEPARIN SODIUM 5000 UNIT(S): 5000 INJECTION INTRAVENOUS; SUBCUTANEOUS at 17:42

## 2024-04-24 RX ADMIN — Medication 81 MILLIGRAM(S): at 11:36

## 2024-04-24 RX ADMIN — ATORVASTATIN CALCIUM 10 MILLIGRAM(S): 80 TABLET, FILM COATED ORAL at 21:24

## 2024-04-24 RX ADMIN — Medication 1 MILLIGRAM(S): at 11:36

## 2024-04-24 RX ADMIN — LOSARTAN POTASSIUM 50 MILLIGRAM(S): 100 TABLET, FILM COATED ORAL at 05:44

## 2024-04-24 RX ADMIN — Medication 25 MICROGRAM(S): at 04:29

## 2024-04-24 RX ADMIN — Medication 6 MILLIGRAM(S): at 21:24

## 2024-04-24 RX ADMIN — HEPARIN SODIUM 5000 UNIT(S): 5000 INJECTION INTRAVENOUS; SUBCUTANEOUS at 05:44

## 2024-04-24 NOTE — PROGRESS NOTE ADULT - SUBJECTIVE AND OBJECTIVE BOX
Patient is a 89y old  Female who presents with a chief complaint of unresponsive episode, bradycardia (24 Apr 2024 10:56)    Date of servie : 04-24-24 @ 16:07  INTERVAL HPI/OVERNIGHT EVENTS:  T(C): 36.9 (04-24-24 @ 10:07), Max: 36.9 (04-24-24 @ 10:07)  HR: 86 (04-24-24 @ 10:07) (79 - 86)  BP: 158/79 (04-24-24 @ 10:07) (127/60 - 158/79)  RR: 18 (04-24-24 @ 10:07) (18 - 18)  SpO2: 95% (04-24-24 @ 10:07) (95% - 100%)  Wt(kg): --  I&O's Summary    23 Apr 2024 07:01  -  24 Apr 2024 07:00  --------------------------------------------------------  IN: 200 mL / OUT: 300 mL / NET: -100 mL        LABS:                        12.2   9.66  )-----------( 168      ( 24 Apr 2024 06:25 )             35.5     04-24    145  |  111<H>  |  26<H>  ----------------------------<  95  3.8   |  18<L>  |  0.77    Ca    9.1      24 Apr 2024 06:25  Phos  1.8     04-24  Mg     1.90     04-24        Urinalysis Basic - ( 24 Apr 2024 06:25 )    Color: x / Appearance: x / SG: x / pH: x  Gluc: 95 mg/dL / Ketone: x  / Bili: x / Urobili: x   Blood: x / Protein: x / Nitrite: x   Leuk Esterase: x / RBC: x / WBC x   Sq Epi: x / Non Sq Epi: x / Bacteria: x      CAPILLARY BLOOD GLUCOSE            Urinalysis Basic - ( 24 Apr 2024 06:25 )    Color: x / Appearance: x / SG: x / pH: x  Gluc: 95 mg/dL / Ketone: x  / Bili: x / Urobili: x   Blood: x / Protein: x / Nitrite: x   Leuk Esterase: x / RBC: x / WBC x   Sq Epi: x / Non Sq Epi: x / Bacteria: x        MEDICATIONS  (STANDING):  aspirin enteric coated 81 milliGRAM(s) Oral daily  atorvastatin 10 milliGRAM(s) Oral at bedtime  folic acid 1 milliGRAM(s) Oral daily  heparin   Injectable 5000 Unit(s) SubCutaneous every 12 hours  influenza  Vaccine (HIGH DOSE) 0.7 milliLiter(s) IntraMuscular once  levothyroxine 25 MICROGram(s) Oral daily  losartan 50 milliGRAM(s) Oral daily  melatonin 6 milliGRAM(s) Oral <User Schedule>    MEDICATIONS  (PRN):  acetaminophen     Tablet .. 650 milliGRAM(s) Oral every 6 hours PRN Temp greater or equal to 38C (100.4F), Mild Pain (1 - 3)          PHYSICAL EXAM:  GENERAL: NAD, well-groomed, well-developed  HEAD:  Atraumatic, Normocephalic  CHEST/LUNG: Clear to percussion bilaterally; No rales, rhonchi, wheezing, or rubs  HEART: Regular rate and rhythm; No murmurs, rubs, or gallops  ABDOMEN: Soft, Nontender, Nondistended; Bowel sounds present  EXTREMITIES:  2+ Peripheral Pulses, No clubbing, cyanosis, or edema  LYMPH: No lymphadenopathy noted  SKIN: No rashes or lesions    Care Discussed with Consultants/Other Providers [ x] YES  [ ] NO

## 2024-04-24 NOTE — PROGRESS NOTE ADULT - SUBJECTIVE AND OBJECTIVE BOX
Date of service 4/24/24    extended hpi: 89 year old Female with PMH dementia A&Ox2, HTN, HLD, hypothyroid, recent onset of delusions who presents with unresponsive episode    appears comfortable, no events overnight    Review of Systems:   Constitutional: [ ] fevers, [ ] chills.   Skin: [ ] dry skin. [ ] rashes.  Psychiatric: [ ] depression, [ ] anxiety.   Gastrointestinal: [ ] BRBPR, [ ] melena.   Neurological: [ ] confusion. [ ] seizures. [ ] shuffling gait.   Ears,Nose,Mouth and Throat: [ ] ear pain [ ] sore throat.   Eyes: [ ] diplopia.   Respiratory: [ ] hemoptysis. [ ] shortness of breath  Cardiovascular: See HPI above  Hematologic/Lymphatic: [ ] anemia. [ ] painful nodes. [ ] prolonged bleeding.   Genitourinary: [ ] hematuria. [ ] flank pain.   Endocrine: [ ] significant change in weight. [ ] intolerance to heat and cold.     Review of systems [x ] otherwise negative, [ ] otherwise unable to obtain    FH: no family history of sudden cardiac death in first degree relatives    SH: [ ] tobacco, [ ] alcohol, [ ] drugs    acetaminophen     Tablet .. 650 milliGRAM(s) Oral every 6 hours PRN  aspirin enteric coated 81 milliGRAM(s) Oral daily  atorvastatin 10 milliGRAM(s) Oral at bedtime  folic acid 1 milliGRAM(s) Oral daily  heparin   Injectable 5000 Unit(s) SubCutaneous every 12 hours  influenza  Vaccine (HIGH DOSE) 0.7 milliLiter(s) IntraMuscular once  levothyroxine 25 MICROGram(s) Oral daily  losartan 50 milliGRAM(s) Oral daily  melatonin 6 milliGRAM(s) Oral <User Schedule>                            12.2   9.66  )-----------( 168      ( 24 Apr 2024 06:25 )             35.5       04-24    145  |  111<H>  |  26<H>  ----------------------------<  95  3.8   |  18<L>  |  0.77    Ca    9.1      24 Apr 2024 06:25  Phos  1.8     04-24  Mg     1.90     04-24        CARDIAC MARKERS ( 21 Apr 2024 13:51 )  x     / x     / 118 U/L / x     / x            T(C): 36.4 (04-24-24 @ 05:40), Max: 37.1 (04-23-24 @ 12:05)  HR: 80 (04-24-24 @ 05:40) (79 - 82)  BP: 149/68 (04-24-24 @ 05:40) (123/60 - 149/68)  RR: 18 (04-24-24 @ 05:40) (18 - 18)  SpO2: 100% (04-24-24 @ 05:40) (100% - 100%)  Wt(kg): --    I&O's Summary    23 Apr 2024 07:01  -  24 Apr 2024 07:00  --------------------------------------------------------  IN: 200 mL / OUT: 300 mL / NET: -100 mL    General: Well nourished in no acute distress.   Head: Normocephalic and atraumatic.   Neck: No JVD. No bruits. Supple. Does not appear to be enlarged.   Cardiovascular: + S1,S2 ; RRR Soft systolic murmur at the left lower sternal border. No rubs noted.    Lungs: CTA b/l. No rhonchi, rales or wheezes.   Abdomen: + BS, soft. Non tender. Non distended. No rebound. No guarding.   Extremities: No clubbing/cyanosis/edema.   Neurologic: Moves all four extremities  Skin: Warm and moist. The patient's skin has normal elasticity and good skin turgor.   Psychiatric: Appropriate mood and affect.  Musculoskeletal: Normal range of motion    DATA    TELEMETRY: SR 80	      ECG:  	Sinus bradycardia NS ST-T changes    < from: CT Angio Head w/ IV Cont (04.21.24 @ 12:14) >  IMPRESSION:    CT HEAD:  1.  No evidence of acute intracranial hemorrhage or midline shift.  2.  Subacute to chronic appearing infarcts in the bilateral basal ganglia.  3.  Chronic small vessel disease.    CTA BRAIN:  1.  Multifocal severe stenosis in the left A2 segment with distal   reconstitution.  2.  Moderate to severe focal narrowing in the proximal right M1 segment.  3.  Other focal mild tomoderate luminal narrowings in the major   intracranial vasculature as discussed above.    CTA NECK:  1.  No evidence of critical stenosis by NASCET criteria.  2.  Nodular calcification in the right lobe of the thyroid. Recommend   nonemergent thyroid ultrasound for further characterization if clinically   indicated.  3.  The level degenerative change of the cervical spine, most   significantly at the C4-C5 level where there is severe narrowing of the   spinal canal. Recommend MRI of the cervicalspine for further evaluation.  4.  Subcentimeter nodule in the right lung apex. Recommend follow-up   imaging as per Fleischner criteria.  < end of copied text >      ASSESSMENT/PLAN: 	89 year old Female with PMH dementia A&Ox2, HTN, HLD, hypothyroid, recent onset of delusions who presents with unresponsive episode  --Metoprolol on hold, HR stable  --resumed Losartan for HTN, BP improving  --MRI pending/  EEG noted / neuro eval in progress  --not in clinical CHF  --monitor on tele    Olesya ANDUJAR  [phone]

## 2024-04-24 NOTE — EEG REPORT - NS EEG TEXT BOX
JANUARY JUDGE [id number]     Study Date: 4/23/24 11:15 - 4/24/24 08:00  Duration: 20 hr 45 min  --------------------------------------------------------------------------------------------------  History:  CC/ HPI Patient is a 89y old  Female who presents with a chief complaint of unresponsive episode, bradycardia (23 Apr 2024 11:43)    MEDICATIONS  (STANDING):  aspirin enteric coated 81 milliGRAM(s) Oral daily  atorvastatin 10 milliGRAM(s) Oral at bedtime  folic acid 1 milliGRAM(s) Oral daily  heparin   Injectable 5000 Unit(s) SubCutaneous every 12 hours  influenza  Vaccine (HIGH DOSE) 0.7 milliLiter(s) IntraMuscular once  levothyroxine 25 MICROGram(s) Oral daily  losartan 50 milliGRAM(s) Oral daily  melatonin 6 milliGRAM(s) Oral <User Schedule>    --------------------------------------------------------------------------------------------------  Study Interpretation:    [[[Abbreviation Key:  PDR=alpha rhythm/posterior dominant rhythm. A-P=anterior posterior.  Amplitude: ‘very low’:<20; ‘low’:20-49; ‘medium’:; ‘high’:>150uV.  Persistence for periodic/rhythmic patterns (% of epoch) ‘rare’:<1%; ‘occasional’:1-10%; ‘frequent’:10-50%; ‘abundant’:50-90%; ‘continuous’:>90%.  Persistence for sporadic discharges: ‘rare’:<1/hr; ‘occasional’:1/min-1/hr; ‘frequent’:>1/min; ‘abundant’:>1/10 sec.  RPP=rhythmic and periodic patterns; GRDA=generalized rhythmic delta activity; FIRDA=frontal intermittent GRDA; LRDA=lateralized rhythmic delta activity; TIRDA=temporal intermittent rhythmic delta activity;  LPD=PLED=lateralized periodic discharges; GPD=generalized periodic discharges; BIPDs =bilateral independent periodic discharges; Mf=multifocal; SIRPDs=stimulus induced rhythmic, periodic, or ictal appearing discharges; BIRDs=brief potentially ictal rhythmic discharges >4 Hz, lasting .5-10s; PFA (paroxysmal bursts >13 Hz or =8 Hz <10s).  Modifiers: +F=with fast component; +S=with spike component; +R=with rhythmic component.  S-B=burst suppression pattern.  Max=maximal. N1-drowsy; N2-stage II sleep; N3-slow wave sleep. SSS/BETS=small sharp spikes/benign epileptiform transients of sleep. HV=hyperventilation; PS=photic stimulation]]]    Daily EEG Visual Analysis    FINDINGS:      Background:  Continuity: Continuous  Symmetry: Symmetric  Posterior dominant rhythm (PDR): 8.5 Hz, reactive to eye closure. Symmetric low-amplitude frontal beta in wakefulness.  Voltage: Normal  Anterior-Posterior Gradient: Present  Other background findings: None  Breach: Absent    Background Slowing:  Generalized slowing: None  Focal slowing: Occasional bilateral independent temporal focal polymorphic delta slowing    State Changes:   Drowsiness is characterized by fragmentation, attenuation, and slowing of the background activity.  Stage 2 sleep is characterized by symmetric K complexes and sleep spindles.     Interictal Findings:  None    Electrographic and Electroclinical seizures:  None    Other Clinical Events:  None    Activation Procedures:   Hyperventilation is not performed.    Photic stimulation is performed and does not elicit any abnormalities.      Artifacts:  Intermittent myogenic and movement artifacts are present.    EKG:  Single-lead EKG limited by artifact.    EEG Classification / Summary:  Abnormal EEG in the awake, drowsy, and asleep states.   Bilateral temporal focal slowing.  No epileptiform abnormalities are captured.     Clinical Impression:  Bilateral temporal focal cerebral dysfunction can be structural or functional in etiology.   No epileptiform abnormalities or seizures.          -------------------------------------------------------------------------------------------------------    William Jo MD  Research EEG Fellow, [facility] Epilepsy [place]    Margy Escalante MD  Attending Physician, [facility]    -------------------------------------------------------------------------------------------------------    To reach EEG technologist:  Please use the pager number for the appropriate hospital or contact the .  At [facility] - Pager #: [phone]    To reach EEG-reading physician:  [facility] EEG Reading Room Phone #: [phone]  Epilepsy Answering Service after 5PM and before 8:30AM: Phone #: [phone]     JANUARY JUDGE [id number]     Study Date: 4/23/24 11:15 - 4/24/24 13:08  Duration: 25 hr 52 min  --------------------------------------------------------------------------------------------------  History:  CC/ HPI Patient is a 89y old  Female who presents with a chief complaint of unresponsive episode, bradycardia (23 Apr 2024 11:43)    MEDICATIONS  (STANDING):  aspirin enteric coated 81 milliGRAM(s) Oral daily  atorvastatin 10 milliGRAM(s) Oral at bedtime  folic acid 1 milliGRAM(s) Oral daily  heparin   Injectable 5000 Unit(s) SubCutaneous every 12 hours  influenza  Vaccine (HIGH DOSE) 0.7 milliLiter(s) IntraMuscular once  levothyroxine 25 MICROGram(s) Oral daily  losartan 50 milliGRAM(s) Oral daily  melatonin 6 milliGRAM(s) Oral <User Schedule>    --------------------------------------------------------------------------------------------------  Study Interpretation:    [[[Abbreviation Key:  PDR=alpha rhythm/posterior dominant rhythm. A-P=anterior posterior.  Amplitude: ‘very low’:<20; ‘low’:20-49; ‘medium’:; ‘high’:>150uV.  Persistence for periodic/rhythmic patterns (% of epoch) ‘rare’:<1%; ‘occasional’:1-10%; ‘frequent’:10-50%; ‘abundant’:50-90%; ‘continuous’:>90%.  Persistence for sporadic discharges: ‘rare’:<1/hr; ‘occasional’:1/min-1/hr; ‘frequent’:>1/min; ‘abundant’:>1/10 sec.  RPP=rhythmic and periodic patterns; GRDA=generalized rhythmic delta activity; FIRDA=frontal intermittent GRDA; LRDA=lateralized rhythmic delta activity; TIRDA=temporal intermittent rhythmic delta activity;  LPD=PLED=lateralized periodic discharges; GPD=generalized periodic discharges; BIPDs =bilateral independent periodic discharges; Mf=multifocal; SIRPDs=stimulus induced rhythmic, periodic, or ictal appearing discharges; BIRDs=brief potentially ictal rhythmic discharges >4 Hz, lasting .5-10s; PFA (paroxysmal bursts >13 Hz or =8 Hz <10s).  Modifiers: +F=with fast component; +S=with spike component; +R=with rhythmic component.  S-B=burst suppression pattern.  Max=maximal. N1-drowsy; N2-stage II sleep; N3-slow wave sleep. SSS/BETS=small sharp spikes/benign epileptiform transients of sleep. HV=hyperventilation; PS=photic stimulation]]]    Daily EEG Visual Analysis    FINDINGS:      Background:  Continuity: Continuous  Symmetry: Symmetric  Posterior dominant rhythm (PDR): 8.5 Hz, reactive to eye closure. Symmetric low-amplitude frontal beta in wakefulness.  Voltage: Normal  Anterior-Posterior Gradient: Present  Other background findings: None  Breach: Absent    Background Slowing:  Generalized slowing: None  Focal slowing: Occasional bilateral independent temporal focal polymorphic delta slowing    State Changes:   Drowsiness is characterized by fragmentation, attenuation, and slowing of the background activity.  Stage 2 sleep is characterized by symmetric K complexes and sleep spindles.     Interictal Findings:  None    Electrographic and Electroclinical seizures:  None    Other Clinical Events:  None    Activation Procedures:   Hyperventilation is not performed.    Photic stimulation is performed and does not elicit any abnormalities.      Artifacts:  Intermittent myogenic and movement artifacts are present.    EKG:  Single-lead EKG limited by artifact.    EEG Classification / Summary:  Abnormal EEG in the awake, drowsy, and asleep states.   Bilateral temporal focal slowing.  No epileptiform abnormalities are captured.     Clinical Impression:  Bilateral temporal focal cerebral dysfunction can be structural or functional in etiology.   No epileptiform abnormalities or seizures.          -------------------------------------------------------------------------------------------------------    William Jo MD  Research EEG Fellow, [facility] Epilepsy [place]    Margy Escalante MD  Attending Physician, [facility]    -------------------------------------------------------------------------------------------------------    To reach EEG technologist:  Please use the pager number for the appropriate hospital or contact the .  At [facility] - Pager #: [phone]    To reach EEG-reading physician:  [facility] EEG Reading Room Phone #: [phone]  Epilepsy Answering Service after 5PM and before 8:30AM: Phone #: [phone]

## 2024-04-24 NOTE — CHART NOTE - NSCHARTNOTEFT_GEN_A_CORE
EEG Study Date: 4/23/24 11:15 - 4/24/24 13:08; Duration: 25 hr 52 min  EEG Classification / Summary:  Abnormal EEG in the awake, drowsy, and asleep states.   Bilateral temporal focal slowing.  No epileptiform abnormalities are captured.   Clinical Impression:  Bilateral temporal focal cerebral dysfunction can be structural or functional in etiology.   No epileptiform abnormalities or seizures.    Ms. Patino is a 89 yo woman with a h/o dementia who presented with an episode of prolonged unresponsiveness. She was noted to be bradycardic to the 40s on presentation with numerous metabolic derangements. Video EEG was performed as above and did not show evidence of epileptiform activity or seizures.     Impression: toxic-metabolic encephalopathy vs cardiogenic syncope vs related to underlying dementia    Recommendations:   - Correct metabolic derangements as appropriate  - Cardiac workup and management as per primary team/cardiology  - No additional acute inpatient neurologic work-up indicated at this time  - Patient can follow up with general neurology at [address] [place] [address] ([phone]).     Neurology signing off. Please call [facility] at [phone] with any questions. EEG Study Date: 4/23/24 11:15 - 4/24/24 13:08; Duration: 25 hr 52 min  EEG Classification / Summary:  Abnormal EEG in the awake, drowsy, and asleep states.   Bilateral temporal focal slowing.  No epileptiform abnormalities are captured.   Clinical Impression:  Bilateral temporal focal cerebral dysfunction can be structural or functional in etiology.   No epileptiform abnormalities or seizures.    Ms. Patino is a 89 yo woman with a h/o dementia who presented with an episode of prolonged unresponsiveness. She was noted to be bradycardic to the 40s on presentation with numerous metabolic derangements. Video EEG was performed as above and did not show evidence of epileptiform activity or seizures.     Impression: toxic-metabolic encephalopathy vs cardiogenic syncope vs related to underlying dementia    Recommendations:   - Correct metabolic derangements as appropriate  - Cardiac workup and management as per primary team/cardiology  - No additional acute inpatient neurologic work-up indicated at this time  - Patient can follow up with general neurology at [address] [place] [address] ([phone]).     Neurology signing off. Please call [facility] at [phone] with any questions.  Thank you
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
## 2024-04-25 LAB
ANION GAP SERPL CALC-SCNC: 17 MMOL/L — HIGH (ref 7–14)
APPEARANCE UR: CLEAR — SIGNIFICANT CHANGE UP
BACTERIA # UR AUTO: ABNORMAL /HPF
BILIRUB UR-MCNC: NEGATIVE — SIGNIFICANT CHANGE UP
BUN SERPL-MCNC: 25 MG/DL — HIGH (ref 7–23)
CALCIUM SERPL-MCNC: 8.7 MG/DL — SIGNIFICANT CHANGE UP (ref 8.4–10.5)
CAST: 0 /LPF — SIGNIFICANT CHANGE UP (ref 0–4)
CHLORIDE SERPL-SCNC: 109 MMOL/L — HIGH (ref 98–107)
CO2 SERPL-SCNC: 18 MMOL/L — LOW (ref 22–31)
COLOR SPEC: SIGNIFICANT CHANGE UP
CREAT SERPL-MCNC: 0.75 MG/DL — SIGNIFICANT CHANGE UP (ref 0.5–1.3)
DIFF PNL FLD: NEGATIVE — SIGNIFICANT CHANGE UP
EGFR: 73 ML/MIN/1.73M2 — SIGNIFICANT CHANGE UP
GLUCOSE SERPL-MCNC: 109 MG/DL — HIGH (ref 70–99)
GLUCOSE UR QL: NEGATIVE MG/DL — SIGNIFICANT CHANGE UP
HCT VFR BLD CALC: 35.4 % — SIGNIFICANT CHANGE UP (ref 34.5–45)
HGB BLD-MCNC: 12.3 G/DL — SIGNIFICANT CHANGE UP (ref 11.5–15.5)
KETONES UR-MCNC: 15 MG/DL
LEUKOCYTE ESTERASE UR-ACNC: NEGATIVE — SIGNIFICANT CHANGE UP
MAGNESIUM SERPL-MCNC: 1.8 MG/DL — SIGNIFICANT CHANGE UP (ref 1.6–2.6)
MCHC RBC-ENTMCNC: 34.7 GM/DL — SIGNIFICANT CHANGE UP (ref 32–36)
MCHC RBC-ENTMCNC: 34.7 PG — HIGH (ref 27–34)
MCV RBC AUTO: 100 FL — SIGNIFICANT CHANGE UP (ref 80–100)
NITRITE UR-MCNC: NEGATIVE — SIGNIFICANT CHANGE UP
NRBC # BLD: 0 /100 WBCS — SIGNIFICANT CHANGE UP (ref 0–0)
NRBC # FLD: 0 K/UL — SIGNIFICANT CHANGE UP (ref 0–0)
PH UR: 7 — SIGNIFICANT CHANGE UP (ref 5–8)
PHOSPHATE SERPL-MCNC: 2.7 MG/DL — SIGNIFICANT CHANGE UP (ref 2.5–4.5)
PLATELET # BLD AUTO: 173 K/UL — SIGNIFICANT CHANGE UP (ref 150–400)
POTASSIUM SERPL-MCNC: 3.5 MMOL/L — SIGNIFICANT CHANGE UP (ref 3.5–5.3)
POTASSIUM SERPL-SCNC: 3.5 MMOL/L — SIGNIFICANT CHANGE UP (ref 3.5–5.3)
PROT UR-MCNC: 300 MG/DL
RBC # BLD: 3.54 M/UL — LOW (ref 3.8–5.2)
RBC # FLD: 13.1 % — SIGNIFICANT CHANGE UP (ref 10.3–14.5)
RBC CASTS # UR COMP ASSIST: 3 /HPF — SIGNIFICANT CHANGE UP (ref 0–4)
SODIUM SERPL-SCNC: 144 MMOL/L — SIGNIFICANT CHANGE UP (ref 135–145)
SP GR SPEC: 1.03 — SIGNIFICANT CHANGE UP (ref 1–1.03)
SQUAMOUS # UR AUTO: 1 /HPF — SIGNIFICANT CHANGE UP (ref 0–5)
UROBILINOGEN FLD QL: 1 MG/DL — SIGNIFICANT CHANGE UP (ref 0.2–1)
VIT B1 SERPL-MCNC: 143.8 NMOL/L — SIGNIFICANT CHANGE UP (ref 66.5–200)
WBC # BLD: 10.05 K/UL — SIGNIFICANT CHANGE UP (ref 3.8–10.5)
WBC # FLD AUTO: 10.05 K/UL — SIGNIFICANT CHANGE UP (ref 3.8–10.5)
WBC UR QL: 1 /HPF — SIGNIFICANT CHANGE UP (ref 0–5)

## 2024-04-25 PROCEDURE — 99232 SBSQ HOSP IP/OBS MODERATE 35: CPT

## 2024-04-25 PROCEDURE — 93010 ELECTROCARDIOGRAM REPORT: CPT

## 2024-04-25 RX ORDER — METOPROLOL TARTRATE 50 MG
2.5 TABLET ORAL ONCE
Refills: 0 | Status: COMPLETED | OUTPATIENT
Start: 2024-04-25 | End: 2024-04-25

## 2024-04-25 RX ORDER — AMLODIPINE BESYLATE 2.5 MG/1
5 TABLET ORAL DAILY
Refills: 0 | Status: DISCONTINUED | OUTPATIENT
Start: 2024-04-25 | End: 2024-05-02

## 2024-04-25 RX ORDER — SODIUM CHLORIDE 9 MG/ML
1000 INJECTION, SOLUTION INTRAVENOUS ONCE
Refills: 0 | Status: COMPLETED | OUTPATIENT
Start: 2024-04-25 | End: 2024-04-25

## 2024-04-25 RX ADMIN — ATORVASTATIN CALCIUM 10 MILLIGRAM(S): 80 TABLET, FILM COATED ORAL at 22:11

## 2024-04-25 RX ADMIN — Medication 1 MILLIGRAM(S): at 11:42

## 2024-04-25 RX ADMIN — Medication 2.5 MILLIGRAM(S): at 04:45

## 2024-04-25 RX ADMIN — Medication 81 MILLIGRAM(S): at 11:42

## 2024-04-25 RX ADMIN — LOSARTAN POTASSIUM 50 MILLIGRAM(S): 100 TABLET, FILM COATED ORAL at 05:43

## 2024-04-25 RX ADMIN — OLANZAPINE 1.25 MILLIGRAM(S): 15 TABLET, FILM COATED ORAL at 22:13

## 2024-04-25 RX ADMIN — SODIUM CHLORIDE 1000 MILLILITER(S): 9 INJECTION, SOLUTION INTRAVENOUS at 19:32

## 2024-04-25 RX ADMIN — HEPARIN SODIUM 5000 UNIT(S): 5000 INJECTION INTRAVENOUS; SUBCUTANEOUS at 05:43

## 2024-04-25 RX ADMIN — Medication 6 MILLIGRAM(S): at 22:21

## 2024-04-25 RX ADMIN — Medication 25 MICROGRAM(S): at 04:38

## 2024-04-25 RX ADMIN — HEPARIN SODIUM 5000 UNIT(S): 5000 INJECTION INTRAVENOUS; SUBCUTANEOUS at 16:48

## 2024-04-25 NOTE — BH CONSULTATION LIAISON PROGRESS NOTE - NSBHFUPINTERVALHXFT_PSY_A_CORE
Chart, labs, imagine reviewed. Case discussed with the primary team. Patient seen at bedside.   Patient is waxing and waning in her attentiveness and orientation. Worries that someone could break in her house. No acute events over night. Patient denied suicidal or homicidal ideations, intent or a plan.    Discussed with the family the need for outpatient follow up.
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
## 2024-04-25 NOTE — PROGRESS NOTE ADULT - SUBJECTIVE AND OBJECTIVE BOX
Date of service 4/25/24    extended hpi: 89 year old Female with PMH dementia A&Ox2, HTN, HLD, hypothyroid, recent onset of delusions who presents with unresponsive episode    appears comfortable, no events overnight    Review of Systems:   Constitutional: [ ] fevers, [ ] chills.   Skin: [ ] dry skin. [ ] rashes.  Psychiatric: [ ] depression, [ ] anxiety.   Gastrointestinal: [ ] BRBPR, [ ] melena.   Neurological: [ ] confusion. [ ] seizures. [ ] shuffling gait.   Ears,Nose,Mouth and Throat: [ ] ear pain [ ] sore throat.   Eyes: [ ] diplopia.   Respiratory: [ ] hemoptysis. [ ] shortness of breath  Cardiovascular: See HPI above  Hematologic/Lymphatic: [ ] anemia. [ ] painful nodes. [ ] prolonged bleeding.   Genitourinary: [ ] hematuria. [ ] flank pain.   Endocrine: [ ] significant change in weight. [ ] intolerance to heat and cold.     Review of systems [x ] otherwise negative, [ ] otherwise unable to obtain    FH: no family history of sudden cardiac death in first degree relatives    SH: [ ] tobacco, [ ] alcohol, [ ] drugs    acetaminophen     Tablet .. 650 milliGRAM(s) Oral every 6 hours PRN  amLODIPine   Tablet 5 milliGRAM(s) Oral daily  aspirin enteric coated 81 milliGRAM(s) Oral daily  atorvastatin 10 milliGRAM(s) Oral at bedtime  folic acid 1 milliGRAM(s) Oral daily  heparin   Injectable 5000 Unit(s) SubCutaneous every 12 hours  influenza  Vaccine (HIGH DOSE) 0.7 milliLiter(s) IntraMuscular once  levothyroxine 25 MICROGram(s) Oral daily  losartan 50 milliGRAM(s) Oral daily  melatonin 6 milliGRAM(s) Oral <User Schedule>  OLANZapine 1.25 milliGRAM(s) Oral every 6 hours PRN  OLANZapine Injectable 1.25 milliGRAM(s) IntraMuscular every 6 hours PRN                            12.3   10.05 )-----------( 173      ( 25 Apr 2024 06:30 )             35.4       144  |  109<H>  |  25<H>  ----------------------------<  109<H>  3.5   |  18<L>  |  0.75    Ca    8.7      25 Apr 2024 06:30  Phos  2.7     04-25  Mg     1.80     04-25      T(C): 36.6 (04-25-24 @ 04:40), Max: 36.9 (04-24-24 @ 20:02)  HR: 83 (04-25-24 @ 05:40) (83 - 101)  BP: 150/64 (04-25-24 @ 05:40) (150/64 - 174/91)  RR: 18 (04-25-24 @ 04:40) (18 - 18)  SpO2: 97% (04-25-24 @ 04:40) (95% - 97%)  Wt(kg): --    General: Well nourished in no acute distress.   Head: Normocephalic and atraumatic.   Neck: No JVD. No bruits. Supple. Does not appear to be enlarged.   Cardiovascular: + S1,S2 ; RRR Soft systolic murmur at the left lower sternal border. No rubs noted.    Lungs: CTA b/l. No rhonchi, rales or wheezes.   Abdomen: + BS, soft. Non tender. Non distended. No rebound. No guarding.   Extremities: No clubbing/cyanosis/edema.   Neurologic: Moves all four extremities  Skin: Warm and moist. The patient's skin has normal elasticity and good skin turgor.   Psychiatric: Appropriate mood and affect.  Musculoskeletal: Normal range of motion    DATA    TELEMETRY: SR 80	      ECG:  	Sinus bradycardia NS ST-T changes    < from: CT Angio Head w/ IV Cont (04.21.24 @ 12:14) >  IMPRESSION:    CT HEAD:  1.  No evidence of acute intracranial hemorrhage or midline shift.  2.  Subacute to chronic appearing infarcts in the bilateral basal ganglia.  3.  Chronic small vessel disease.    CTA BRAIN:  1.  Multifocal severe stenosis in the left A2 segment with distal   reconstitution.  2.  Moderate to severe focal narrowing in the proximal right M1 segment.  3.  Other focal mild tomoderate luminal narrowings in the major   intracranial vasculature as discussed above.    CTA NECK:  1.  No evidence of critical stenosis by NASCET criteria.  2.  Nodular calcification in the right lobe of the thyroid. Recommend   nonemergent thyroid ultrasound for further characterization if clinically   indicated.  3.  The level degenerative change of the cervical spine, most   significantly at the C4-C5 level where there is severe narrowing of the   spinal canal. Recommend MRI of the cervicalspine for further evaluation.  4.  Subcentimeter nodule in the right lung apex. Recommend follow-up   imaging as per Fleischner criteria.  < end of copied text >      ASSESSMENT/PLAN: 	89 year old Female with PMH dementia A&Ox2, HTN, HLD, hypothyroid, recent onset of delusions who presents with unresponsive episode  --Metoprolol on hold, recommend cont to Hold/DC  --HR stable  --resumed Losartan for HTN, continue  --start Norvasc 5mg daily  --MRI pending/  EEG noted / neuro eval in progress  --not in clinical CHF  --monitor on tele    Olesya ANDUJAR  [phone]

## 2024-04-25 NOTE — PROGRESS NOTE ADULT - SUBJECTIVE AND OBJECTIVE BOX
Patient is a 89y old  Female who presents with a chief complaint of unresponsive episode, bradycardia (25 Apr 2024 10:48)    Date of servie : 04-25-24 @ 17:43  INTERVAL HPI/OVERNIGHT EVENTS:  T(C): 36.6 (04-25-24 @ 04:40), Max: 36.9 (04-24-24 @ 20:02)  HR: 83 (04-25-24 @ 05:40) (83 - 101)  BP: 150/64 (04-25-24 @ 05:40) (150/64 - 174/91)  RR: 18 (04-25-24 @ 04:40) (18 - 18)  SpO2: 97% (04-25-24 @ 04:40) (95% - 97%)  Wt(kg): --  I&O's Summary    24 Apr 2024 07:01  -  25 Apr 2024 07:00  --------------------------------------------------------  IN: 300 mL / OUT: 0 mL / NET: 300 mL        LABS:                        12.3   10.05 )-----------( 173      ( 25 Apr 2024 06:30 )             35.4     04-25    144  |  109<H>  |  25<H>  ----------------------------<  109<H>  3.5   |  18<L>  |  0.75    Ca    8.7      25 Apr 2024 06:30  Phos  2.7     04-25  Mg     1.80     04-25        Urinalysis Basic - ( 25 Apr 2024 06:30 )    Color: x / Appearance: x / SG: x / pH: x  Gluc: 109 mg/dL / Ketone: x  / Bili: x / Urobili: x   Blood: x / Protein: x / Nitrite: x   Leuk Esterase: x / RBC: x / WBC x   Sq Epi: x / Non Sq Epi: x / Bacteria: x      CAPILLARY BLOOD GLUCOSE            Urinalysis Basic - ( 25 Apr 2024 06:30 )    Color: x / Appearance: x / SG: x / pH: x  Gluc: 109 mg/dL / Ketone: x  / Bili: x / Urobili: x   Blood: x / Protein: x / Nitrite: x   Leuk Esterase: x / RBC: x / WBC x   Sq Epi: x / Non Sq Epi: x / Bacteria: x        MEDICATIONS  (STANDING):  amLODIPine   Tablet 5 milliGRAM(s) Oral daily  aspirin enteric coated 81 milliGRAM(s) Oral daily  atorvastatin 10 milliGRAM(s) Oral at bedtime  folic acid 1 milliGRAM(s) Oral daily  heparin   Injectable 5000 Unit(s) SubCutaneous every 12 hours  influenza  Vaccine (HIGH DOSE) 0.7 milliLiter(s) IntraMuscular once  levothyroxine 25 MICROGram(s) Oral daily  losartan 50 milliGRAM(s) Oral daily  melatonin 6 milliGRAM(s) Oral <User Schedule>    MEDICATIONS  (PRN):  acetaminophen     Tablet .. 650 milliGRAM(s) Oral every 6 hours PRN Temp greater or equal to 38C (100.4F), Mild Pain (1 - 3)  OLANZapine 1.25 milliGRAM(s) Oral every 6 hours PRN agitation  OLANZapine Injectable 1.25 milliGRAM(s) IntraMuscular every 6 hours PRN agitation          PHYSICAL EXAM:  GENERAL: NAD, well-groomed, well-developed  HEAD:  Atraumatic, Normocephalic  CHEST/LUNG: Clear to percussion bilaterally; No rales, rhonchi, wheezing, or rubs  HEART: Regular rate and rhythm; No murmurs, rubs, or gallops  ABDOMEN: Soft, Nontender, Nondistended; Bowel sounds present  EXTREMITIES:  2+ Peripheral Pulses, No clubbing, cyanosis, or edema  LYMPH: No lymphadenopathy noted  SKIN: No rashes or lesions    Care Discussed with Consultants/Other Providers [ ] YES  [ ] NO

## 2024-04-26 LAB
ANION GAP SERPL CALC-SCNC: 19 MMOL/L — HIGH (ref 7–14)
BASE EXCESS BLDV CALC-SCNC: -2.9 MMOL/L — LOW (ref -2–3)
BASOPHILS # BLD AUTO: 0.03 K/UL — SIGNIFICANT CHANGE UP (ref 0–0.2)
BASOPHILS NFR BLD AUTO: 0.3 % — SIGNIFICANT CHANGE UP (ref 0–2)
BLOOD GAS VENOUS COMPREHENSIVE RESULT: SIGNIFICANT CHANGE UP
BUN SERPL-MCNC: 22 MG/DL — SIGNIFICANT CHANGE UP (ref 7–23)
CALCIUM SERPL-MCNC: 8.5 MG/DL — SIGNIFICANT CHANGE UP (ref 8.4–10.5)
CHLORIDE BLDV-SCNC: 109 MMOL/L — HIGH (ref 96–108)
CHLORIDE SERPL-SCNC: 111 MMOL/L — HIGH (ref 98–107)
CO2 BLDV-SCNC: 23.2 MMOL/L — SIGNIFICANT CHANGE UP (ref 22–26)
CO2 SERPL-SCNC: 14 MMOL/L — LOW (ref 22–31)
CREAT SERPL-MCNC: 0.7 MG/DL — SIGNIFICANT CHANGE UP (ref 0.5–1.3)
EGFR: 80 ML/MIN/1.73M2 — SIGNIFICANT CHANGE UP
EOSINOPHIL # BLD AUTO: 0.03 K/UL — SIGNIFICANT CHANGE UP (ref 0–0.5)
EOSINOPHIL NFR BLD AUTO: 0.3 % — SIGNIFICANT CHANGE UP (ref 0–6)
GAS PNL BLDV: 140 MMOL/L — SIGNIFICANT CHANGE UP (ref 136–145)
GLUCOSE BLDV-MCNC: 135 MG/DL — HIGH (ref 70–99)
GLUCOSE SERPL-MCNC: 111 MG/DL — HIGH (ref 70–99)
HCO3 BLDV-SCNC: 22 MMOL/L — SIGNIFICANT CHANGE UP (ref 22–29)
HCT VFR BLD CALC: 38.1 % — SIGNIFICANT CHANGE UP (ref 34.5–45)
HCT VFR BLDA CALC: 42 % — SIGNIFICANT CHANGE UP (ref 34.5–46.5)
HGB BLD CALC-MCNC: 13.9 G/DL — SIGNIFICANT CHANGE UP (ref 11.7–16.1)
HGB BLD-MCNC: 13 G/DL — SIGNIFICANT CHANGE UP (ref 11.5–15.5)
IANC: 7.86 K/UL — HIGH (ref 1.8–7.4)
IMM GRANULOCYTES NFR BLD AUTO: 0.4 % — SIGNIFICANT CHANGE UP (ref 0–0.9)
LACTATE BLDV-MCNC: 1.8 MMOL/L — SIGNIFICANT CHANGE UP (ref 0.5–2)
LYMPHOCYTES # BLD AUTO: 1.01 K/UL — SIGNIFICANT CHANGE UP (ref 1–3.3)
LYMPHOCYTES # BLD AUTO: 10 % — LOW (ref 13–44)
MAGNESIUM SERPL-MCNC: 1.8 MG/DL — SIGNIFICANT CHANGE UP (ref 1.6–2.6)
MCHC RBC-ENTMCNC: 34.1 GM/DL — SIGNIFICANT CHANGE UP (ref 32–36)
MCHC RBC-ENTMCNC: 34.3 PG — HIGH (ref 27–34)
MCV RBC AUTO: 100.5 FL — HIGH (ref 80–100)
MONOCYTES # BLD AUTO: 1.08 K/UL — HIGH (ref 0–0.9)
MONOCYTES NFR BLD AUTO: 10.7 % — SIGNIFICANT CHANGE UP (ref 2–14)
NEUTROPHILS # BLD AUTO: 7.86 K/UL — HIGH (ref 1.8–7.4)
NEUTROPHILS NFR BLD AUTO: 78.3 % — HIGH (ref 43–77)
NRBC # BLD: 0 /100 WBCS — SIGNIFICANT CHANGE UP (ref 0–0)
NRBC # FLD: 0 K/UL — SIGNIFICANT CHANGE UP (ref 0–0)
PCO2 BLDV: 38 MMHG — LOW (ref 39–52)
PH BLDV: 7.37 — SIGNIFICANT CHANGE UP (ref 7.32–7.43)
PHOSPHATE SERPL-MCNC: 2.5 MG/DL — SIGNIFICANT CHANGE UP (ref 2.5–4.5)
PLATELET # BLD AUTO: 174 K/UL — SIGNIFICANT CHANGE UP (ref 150–400)
PO2 BLDV: 40 MMHG — SIGNIFICANT CHANGE UP (ref 25–45)
POTASSIUM BLDV-SCNC: 3.5 MMOL/L — SIGNIFICANT CHANGE UP (ref 3.5–5.1)
POTASSIUM SERPL-MCNC: 3.7 MMOL/L — SIGNIFICANT CHANGE UP (ref 3.5–5.3)
POTASSIUM SERPL-SCNC: 3.7 MMOL/L — SIGNIFICANT CHANGE UP (ref 3.5–5.3)
RBC # BLD: 3.79 M/UL — LOW (ref 3.8–5.2)
RBC # FLD: 13.2 % — SIGNIFICANT CHANGE UP (ref 10.3–14.5)
SAO2 % BLDV: 70.1 % — SIGNIFICANT CHANGE UP (ref 67–88)
SODIUM SERPL-SCNC: 144 MMOL/L — SIGNIFICANT CHANGE UP (ref 135–145)
WBC # BLD: 10.05 K/UL — SIGNIFICANT CHANGE UP (ref 3.8–10.5)
WBC # FLD AUTO: 10.05 K/UL — SIGNIFICANT CHANGE UP (ref 3.8–10.5)

## 2024-04-26 PROCEDURE — 93010 ELECTROCARDIOGRAM REPORT: CPT

## 2024-04-26 PROCEDURE — 70553 MRI BRAIN STEM W/O & W/DYE: CPT | Mod: 26

## 2024-04-26 PROCEDURE — 99232 SBSQ HOSP IP/OBS MODERATE 35: CPT

## 2024-04-26 RX ADMIN — Medication 6 MILLIGRAM(S): at 20:05

## 2024-04-26 RX ADMIN — ATORVASTATIN CALCIUM 10 MILLIGRAM(S): 80 TABLET, FILM COATED ORAL at 20:03

## 2024-04-26 RX ADMIN — Medication 25 MICROGRAM(S): at 05:54

## 2024-04-26 RX ADMIN — HEPARIN SODIUM 5000 UNIT(S): 5000 INJECTION INTRAVENOUS; SUBCUTANEOUS at 05:52

## 2024-04-26 RX ADMIN — Medication 650 MILLIGRAM(S): at 20:04

## 2024-04-26 RX ADMIN — Medication 81 MILLIGRAM(S): at 11:51

## 2024-04-26 RX ADMIN — AMLODIPINE BESYLATE 5 MILLIGRAM(S): 2.5 TABLET ORAL at 05:53

## 2024-04-26 RX ADMIN — LOSARTAN POTASSIUM 50 MILLIGRAM(S): 100 TABLET, FILM COATED ORAL at 05:54

## 2024-04-26 RX ADMIN — Medication 1 MILLIGRAM(S): at 11:51

## 2024-04-26 RX ADMIN — HEPARIN SODIUM 5000 UNIT(S): 5000 INJECTION INTRAVENOUS; SUBCUTANEOUS at 17:43

## 2024-04-26 RX ADMIN — Medication 650 MILLIGRAM(S): at 21:04
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
## 2024-04-26 NOTE — BH CONSULTATION LIAISON PROGRESS NOTE - NSBHATTESTCOMMENTATTENDFT_PSY_A_CORE
Chart reviewed. Seen separately from trainee. Received PRNs and on exam was sedated. Family at bedside. Awaiting MRI. Agree with above assessment/recs. Will continue to follow.
Chart reviewed. Seen with trainee. Calm, though confused. Family at bedside, hoping to get her home today. Agree with above assessment/recs. Will conitnue to follow though there is no psych barrier to discharge when med cleared. 
Chart reviewed. Attempted to see but was getting bed pan placed. Calm. Met family. Discussed with trainee. Agree with above assessment/recs. Will continue to follow
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
## 2024-04-26 NOTE — PROGRESS NOTE ADULT - SUBJECTIVE AND OBJECTIVE BOX
Patient is a 89y old  Female who presents with a chief complaint of unresponsive episode, bradycardia (26 Apr 2024 12:45)    Date of servie : 04-26-24 @ 15:14  INTERVAL HPI/OVERNIGHT EVENTS:  T(C): 36.9 (04-26-24 @ 11:45), Max: 37.1 (04-25-24 @ 20:31)  HR: 97 (04-26-24 @ 11:45) (85 - 130)  BP: 160/90 (04-26-24 @ 11:45) (146/82 - 160/90)  RR: 18 (04-26-24 @ 11:45) (18 - 19)  SpO2: 100% (04-26-24 @ 11:45) (97% - 100%)  Wt(kg): --  I&O's Summary    26 Apr 2024 07:01  -  26 Apr 2024 15:14  --------------------------------------------------------  IN: 200 mL / OUT: 0 mL / NET: 200 mL        LABS:                        13.0   10.05 )-----------( 174      ( 26 Apr 2024 07:00 )             38.1     04-26    144  |  111<H>  |  22  ----------------------------<  111<H>  3.7   |  14<L>  |  0.70    Ca    8.5      26 Apr 2024 07:00  Phos  2.5     04-26  Mg     1.80     04-26        Urinalysis Basic - ( 26 Apr 2024 07:00 )    Color: x / Appearance: x / SG: x / pH: x  Gluc: 111 mg/dL / Ketone: x  / Bili: x / Urobili: x   Blood: x / Protein: x / Nitrite: x   Leuk Esterase: x / RBC: x / WBC x   Sq Epi: x / Non Sq Epi: x / Bacteria: x      CAPILLARY BLOOD GLUCOSE            Urinalysis Basic - ( 26 Apr 2024 07:00 )    Color: x / Appearance: x / SG: x / pH: x  Gluc: 111 mg/dL / Ketone: x  / Bili: x / Urobili: x   Blood: x / Protein: x / Nitrite: x   Leuk Esterase: x / RBC: x / WBC x   Sq Epi: x / Non Sq Epi: x / Bacteria: x        MEDICATIONS  (STANDING):  amLODIPine   Tablet 5 milliGRAM(s) Oral daily  aspirin enteric coated 81 milliGRAM(s) Oral daily  atorvastatin 10 milliGRAM(s) Oral at bedtime  folic acid 1 milliGRAM(s) Oral daily  heparin   Injectable 5000 Unit(s) SubCutaneous every 12 hours  influenza  Vaccine (HIGH DOSE) 0.7 milliLiter(s) IntraMuscular once  levothyroxine 25 MICROGram(s) Oral daily  losartan 50 milliGRAM(s) Oral daily  melatonin 6 milliGRAM(s) Oral <User Schedule>    MEDICATIONS  (PRN):  acetaminophen     Tablet .. 650 milliGRAM(s) Oral every 6 hours PRN Temp greater or equal to 38C (100.4F), Mild Pain (1 - 3)          PHYSICAL EXAM:  GENERAL: NAD, well-groomed, well-developed  HEAD:  Atraumatic, Normocephalic  CHEST/LUNG: Clear to percussion bilaterally; No rales, rhonchi, wheezing, or rubs  HEART: Regular rate and rhythm; No murmurs, rubs, or gallops  ABDOMEN: Soft, Nontender, Nondistended; Bowel sounds present  EXTREMITIES:  2+ Peripheral Pulses, No clubbing, cyanosis, or edema  LYMPH: No lymphadenopathy noted  SKIN: No rashes or lesions    Care Discussed with Consultants/Other Providers [ ] YES  [ ] NO

## 2024-04-27 LAB
ANION GAP SERPL CALC-SCNC: 15 MMOL/L — HIGH (ref 7–14)
BASOPHILS # BLD AUTO: 0.03 K/UL — SIGNIFICANT CHANGE UP (ref 0–0.2)
BASOPHILS NFR BLD AUTO: 0.4 % — SIGNIFICANT CHANGE UP (ref 0–2)
BUN SERPL-MCNC: 24 MG/DL — HIGH (ref 7–23)
CALCIUM SERPL-MCNC: 8.2 MG/DL — LOW (ref 8.4–10.5)
CHLORIDE SERPL-SCNC: 110 MMOL/L — HIGH (ref 98–107)
CO2 SERPL-SCNC: 20 MMOL/L — LOW (ref 22–31)
CREAT SERPL-MCNC: 0.8 MG/DL — SIGNIFICANT CHANGE UP (ref 0.5–1.3)
EGFR: 68 ML/MIN/1.73M2 — SIGNIFICANT CHANGE UP
EOSINOPHIL # BLD AUTO: 0.1 K/UL — SIGNIFICANT CHANGE UP (ref 0–0.5)
EOSINOPHIL NFR BLD AUTO: 1.3 % — SIGNIFICANT CHANGE UP (ref 0–6)
GLUCOSE SERPL-MCNC: 102 MG/DL — HIGH (ref 70–99)
HCT VFR BLD CALC: 34.9 % — SIGNIFICANT CHANGE UP (ref 34.5–45)
HGB BLD-MCNC: 11.7 G/DL — SIGNIFICANT CHANGE UP (ref 11.5–15.5)
IANC: 4.95 K/UL — SIGNIFICANT CHANGE UP (ref 1.8–7.4)
IMM GRANULOCYTES NFR BLD AUTO: 0.4 % — SIGNIFICANT CHANGE UP (ref 0–0.9)
LYMPHOCYTES # BLD AUTO: 1.49 K/UL — SIGNIFICANT CHANGE UP (ref 1–3.3)
LYMPHOCYTES # BLD AUTO: 19.5 % — SIGNIFICANT CHANGE UP (ref 13–44)
MAGNESIUM SERPL-MCNC: 1.8 MG/DL — SIGNIFICANT CHANGE UP (ref 1.6–2.6)
MCHC RBC-ENTMCNC: 33 PG — SIGNIFICANT CHANGE UP (ref 27–34)
MCHC RBC-ENTMCNC: 33.5 GM/DL — SIGNIFICANT CHANGE UP (ref 32–36)
MCV RBC AUTO: 98.3 FL — SIGNIFICANT CHANGE UP (ref 80–100)
MONOCYTES # BLD AUTO: 1.05 K/UL — HIGH (ref 0–0.9)
MONOCYTES NFR BLD AUTO: 13.7 % — SIGNIFICANT CHANGE UP (ref 2–14)
NEUTROPHILS # BLD AUTO: 4.95 K/UL — SIGNIFICANT CHANGE UP (ref 1.8–7.4)
NEUTROPHILS NFR BLD AUTO: 64.7 % — SIGNIFICANT CHANGE UP (ref 43–77)
NRBC # BLD: 0 /100 WBCS — SIGNIFICANT CHANGE UP (ref 0–0)
NRBC # FLD: 0 K/UL — SIGNIFICANT CHANGE UP (ref 0–0)
PHOSPHATE SERPL-MCNC: 2.4 MG/DL — LOW (ref 2.5–4.5)
PLATELET # BLD AUTO: 177 K/UL — SIGNIFICANT CHANGE UP (ref 150–400)
POTASSIUM SERPL-MCNC: 3.4 MMOL/L — LOW (ref 3.5–5.3)
POTASSIUM SERPL-SCNC: 3.4 MMOL/L — LOW (ref 3.5–5.3)
RBC # BLD: 3.55 M/UL — LOW (ref 3.8–5.2)
RBC # FLD: 13.2 % — SIGNIFICANT CHANGE UP (ref 10.3–14.5)
SODIUM SERPL-SCNC: 145 MMOL/L — SIGNIFICANT CHANGE UP (ref 135–145)
WBC # BLD: 7.65 K/UL — SIGNIFICANT CHANGE UP (ref 3.8–10.5)
WBC # FLD AUTO: 7.65 K/UL — SIGNIFICANT CHANGE UP (ref 3.8–10.5)

## 2024-04-27 RX ADMIN — ATORVASTATIN CALCIUM 10 MILLIGRAM(S): 80 TABLET, FILM COATED ORAL at 21:02

## 2024-04-27 RX ADMIN — HEPARIN SODIUM 5000 UNIT(S): 5000 INJECTION INTRAVENOUS; SUBCUTANEOUS at 17:07

## 2024-04-27 RX ADMIN — Medication 1 MILLIGRAM(S): at 11:45

## 2024-04-27 RX ADMIN — Medication 6 MILLIGRAM(S): at 21:02

## 2024-04-27 RX ADMIN — Medication 81 MILLIGRAM(S): at 11:45

## 2024-04-27 RX ADMIN — AMLODIPINE BESYLATE 5 MILLIGRAM(S): 2.5 TABLET ORAL at 06:06

## 2024-04-27 RX ADMIN — HEPARIN SODIUM 5000 UNIT(S): 5000 INJECTION INTRAVENOUS; SUBCUTANEOUS at 06:07

## 2024-04-27 RX ADMIN — Medication 25 MICROGRAM(S): at 04:41

## 2024-04-27 RX ADMIN — LOSARTAN POTASSIUM 50 MILLIGRAM(S): 100 TABLET, FILM COATED ORAL at 06:07

## 2024-04-27 NOTE — PROGRESS NOTE ADULT - SUBJECTIVE AND OBJECTIVE BOX
Date of service 4/27/24    extended hpi: 89 year old Female with PMH dementia A&Ox2, HTN, HLD, hypothyroid, recent onset of delusions who presents with unresponsive episode      DATE OF SERVICE: 04-27-24    Patient denies chest pain or shortness of breath.   Review of symptoms otherwise negative.    MEDICATIONS:  acetaminophen     Tablet .. 650 milliGRAM(s) Oral every 6 hours PRN  amLODIPine   Tablet 5 milliGRAM(s) Oral daily  aspirin enteric coated 81 milliGRAM(s) Oral daily  atorvastatin 10 milliGRAM(s) Oral at bedtime  folic acid 1 milliGRAM(s) Oral daily  heparin   Injectable 5000 Unit(s) SubCutaneous every 12 hours  influenza  Vaccine (HIGH DOSE) 0.7 milliLiter(s) IntraMuscular once  levothyroxine 25 MICROGram(s) Oral daily  losartan 50 milliGRAM(s) Oral daily  melatonin 6 milliGRAM(s) Oral <User Schedule>      LABS:                        11.7   7.65  )-----------( 177      ( 27 Apr 2024 05:57 )             34.9       Hemoglobin: 11.7 g/dL (04-27 @ 05:57)  Hemoglobin: 13.0 g/dL (04-26 @ 07:00)  Hemoglobin: 12.3 g/dL (04-25 @ 06:30)  Hemoglobin: 12.2 g/dL (04-24 @ 06:25)  Hemoglobin: 12.4 g/dL (04-23 @ 06:00)      04-27    145  |  110<H>  |  24<H>  ----------------------------<  102<H>  3.4<L>   |  20<L>  |  0.80    Ca    8.2<L>      27 Apr 2024 05:57  Phos  2.4     04-27  Mg     1.80     04-27      Creatinine Trend: 0.80<--, 0.70<--, 0.75<--, 0.77<--, 0.92<--, 0.84<--    COAGS:           PHYSICAL EXAM:  T(C): 36.2 (04-27-24 @ 12:12), Max: 36.7 (04-26-24 @ 22:16)  HR: 84 (04-27-24 @ 12:12) (82 - 88)  BP: 134/71 (04-27-24 @ 12:12) (134/71 - 142/68)  RR: 18 (04-27-24 @ 12:12) (18 - 18)  SpO2: 96% (04-27-24 @ 12:12) (96% - 98%)  Wt(kg): --    I&O's Summary    26 Apr 2024 07:01  -  27 Apr 2024 07:00  --------------------------------------------------------  IN: 400 mL / OUT: 500 mL / NET: -100 mL          General: Well nourished in no acute distress.   Head: Normocephalic and atraumatic.   Neck: No JVD. No bruits. Supple. Does not appear to be enlarged.   Cardiovascular: + S1,S2 ; RRR Soft systolic murmur at the left lower sternal border. No rubs noted.    Lungs: CTA b/l. No rhonchi, rales or wheezes.   Abdomen: + BS, soft. Non tender. Non distended. No rebound. No guarding.   Extremities: No clubbing/cyanosis/edema.   Neurologic: Moves all four extremities  Skin: Warm and moist. The patient's skin has normal elasticity and good skin turgor.   Psychiatric: Appropriate mood and affect.  Musculoskeletal: Normal range of motion    DATA    TELEMETRY: SR 80	      ECG:  	Sinus bradycardia NS ST-T changes    < from: CT Angio Head w/ IV Cont (04.21.24 @ 12:14) >  IMPRESSION:    CT HEAD:  1.  No evidence of acute intracranial hemorrhage or midline shift.  2.  Subacute to chronic appearing infarcts in the bilateral basal ganglia.  3.  Chronic small vessel disease.    CTA BRAIN:  1.  Multifocal severe stenosis in the left A2 segment with distal   reconstitution.  2.  Moderate to severe focal narrowing in the proximal right M1 segment.  3.  Other focal mild tomoderate luminal narrowings in the major   intracranial vasculature as discussed above.    CTA NECK:  1.  No evidence of critical stenosis by NASCET criteria.  2.  Nodular calcification in the right lobe of the thyroid. Recommend   nonemergent thyroid ultrasound for further characterization if clinically   indicated.  3.  The level degenerative change of the cervical spine, most   significantly at the C4-C5 level where there is severe narrowing of the   spinal canal. Recommend MRI of the cervicalspine for further evaluation.  4.  Subcentimeter nodule in the right lung apex. Recommend follow-up   imaging as per Fleischner criteria.  < end of copied text >      ASSESSMENT/PLAN: 	89 year old Female with PMH dementia A&Ox2, HTN, HLD, hypothyroid, recent onset of delusions who presents with unresponsive episode  --Metoprolol on hold, recommend cont to Hold/DC  --HR stable  --resumed Losartan for HTN,  Norvasc 5mg daily, would continue current dose can increase dose if needed  --MRI pending neuro eval in progress  --not in clinical CHF  --monitor on tele      Scotty Baker MD  Pager: [phone]

## 2024-04-27 NOTE — PROGRESS NOTE ADULT - SUBJECTIVE AND OBJECTIVE BOX
Patient is a 89y old  Female who presents with a chief complaint of unresponsive episode, bradycardia (27 Apr 2024 14:37)    Date of servie : 04-27-24 @ 17:33  INTERVAL HPI/OVERNIGHT EVENTS:  T(C): 36.8 (04-27-24 @ 17:00), Max: 36.8 (04-27-24 @ 17:00)  HR: 85 (04-27-24 @ 17:00) (82 - 88)  BP: 139/66 (04-27-24 @ 17:00) (134/71 - 142/68)  RR: 17 (04-27-24 @ 17:00) (17 - 18)  SpO2: 99% (04-27-24 @ 17:00) (96% - 99%)  Wt(kg): --  I&O's Summary    26 Apr 2024 07:01  -  27 Apr 2024 07:00  --------------------------------------------------------  IN: 400 mL / OUT: 500 mL / NET: -100 mL        LABS:                        11.7   7.65  )-----------( 177      ( 27 Apr 2024 05:57 )             34.9     04-27    145  |  110<H>  |  24<H>  ----------------------------<  102<H>  3.4<L>   |  20<L>  |  0.80    Ca    8.2<L>      27 Apr 2024 05:57  Phos  2.4     04-27  Mg     1.80     04-27        Urinalysis Basic - ( 27 Apr 2024 05:57 )    Color: x / Appearance: x / SG: x / pH: x  Gluc: 102 mg/dL / Ketone: x  / Bili: x / Urobili: x   Blood: x / Protein: x / Nitrite: x   Leuk Esterase: x / RBC: x / WBC x   Sq Epi: x / Non Sq Epi: x / Bacteria: x      CAPILLARY BLOOD GLUCOSE            Urinalysis Basic - ( 27 Apr 2024 05:57 )    Color: x / Appearance: x / SG: x / pH: x  Gluc: 102 mg/dL / Ketone: x  / Bili: x / Urobili: x   Blood: x / Protein: x / Nitrite: x   Leuk Esterase: x / RBC: x / WBC x   Sq Epi: x / Non Sq Epi: x / Bacteria: x        MEDICATIONS  (STANDING):  amLODIPine   Tablet 5 milliGRAM(s) Oral daily  aspirin enteric coated 81 milliGRAM(s) Oral daily  atorvastatin 10 milliGRAM(s) Oral at bedtime  folic acid 1 milliGRAM(s) Oral daily  heparin   Injectable 5000 Unit(s) SubCutaneous every 12 hours  influenza  Vaccine (HIGH DOSE) 0.7 milliLiter(s) IntraMuscular once  levothyroxine 25 MICROGram(s) Oral daily  losartan 50 milliGRAM(s) Oral daily  melatonin 6 milliGRAM(s) Oral <User Schedule>    MEDICATIONS  (PRN):  acetaminophen     Tablet .. 650 milliGRAM(s) Oral every 6 hours PRN Temp greater or equal to 38C (100.4F), Mild Pain (1 - 3)          PHYSICAL EXAM:  GENERAL: NAD, well-groomed, well-developed  HEAD:  Atraumatic, Normocephalic  CHEST/LUNG: Clear to percussion bilaterally; No rales, rhonchi, wheezing, or rubs  HEART: Regular rate and rhythm; No murmurs, rubs, or gallops  ABDOMEN: Soft, Nontender, Nondistended; Bowel sounds present  EXTREMITIES:  2+ Peripheral Pulses, No clubbing, cyanosis, or edema  LYMPH: No lymphadenopathy noted  SKIN: No rashes or lesions    Care Discussed with Consultants/Other Providers [ ] YES  [ ] NO

## 2024-04-28 LAB
ANION GAP SERPL CALC-SCNC: 16 MMOL/L — HIGH (ref 7–14)
BASOPHILS # BLD AUTO: 0.04 K/UL — SIGNIFICANT CHANGE UP (ref 0–0.2)
BASOPHILS NFR BLD AUTO: 0.6 % — SIGNIFICANT CHANGE UP (ref 0–2)
BUN SERPL-MCNC: 19 MG/DL — SIGNIFICANT CHANGE UP (ref 7–23)
CALCIUM SERPL-MCNC: 8.1 MG/DL — LOW (ref 8.4–10.5)
CHLORIDE SERPL-SCNC: 108 MMOL/L — HIGH (ref 98–107)
CO2 SERPL-SCNC: 18 MMOL/L — LOW (ref 22–31)
CREAT SERPL-MCNC: 0.66 MG/DL — SIGNIFICANT CHANGE UP (ref 0.5–1.3)
EGFR: 81 ML/MIN/1.73M2 — SIGNIFICANT CHANGE UP
EOSINOPHIL # BLD AUTO: 0.2 K/UL — SIGNIFICANT CHANGE UP (ref 0–0.5)
EOSINOPHIL NFR BLD AUTO: 2.9 % — SIGNIFICANT CHANGE UP (ref 0–6)
GLUCOSE SERPL-MCNC: 100 MG/DL — HIGH (ref 70–99)
HCT VFR BLD CALC: 35.2 % — SIGNIFICANT CHANGE UP (ref 34.5–45)
HGB BLD-MCNC: 11.7 G/DL — SIGNIFICANT CHANGE UP (ref 11.5–15.5)
IANC: 3.9 K/UL — SIGNIFICANT CHANGE UP (ref 1.8–7.4)
IMM GRANULOCYTES NFR BLD AUTO: 0.4 % — SIGNIFICANT CHANGE UP (ref 0–0.9)
LYMPHOCYTES # BLD AUTO: 1.78 K/UL — SIGNIFICANT CHANGE UP (ref 1–3.3)
LYMPHOCYTES # BLD AUTO: 26.1 % — SIGNIFICANT CHANGE UP (ref 13–44)
MAGNESIUM SERPL-MCNC: 1.8 MG/DL — SIGNIFICANT CHANGE UP (ref 1.6–2.6)
MCHC RBC-ENTMCNC: 33.1 PG — SIGNIFICANT CHANGE UP (ref 27–34)
MCHC RBC-ENTMCNC: 33.2 GM/DL — SIGNIFICANT CHANGE UP (ref 32–36)
MCV RBC AUTO: 99.4 FL — SIGNIFICANT CHANGE UP (ref 80–100)
MONOCYTES # BLD AUTO: 0.88 K/UL — SIGNIFICANT CHANGE UP (ref 0–0.9)
MONOCYTES NFR BLD AUTO: 12.9 % — SIGNIFICANT CHANGE UP (ref 2–14)
NEUTROPHILS # BLD AUTO: 3.9 K/UL — SIGNIFICANT CHANGE UP (ref 1.8–7.4)
NEUTROPHILS NFR BLD AUTO: 57.1 % — SIGNIFICANT CHANGE UP (ref 43–77)
NRBC # BLD: 0 /100 WBCS — SIGNIFICANT CHANGE UP (ref 0–0)
NRBC # FLD: 0 K/UL — SIGNIFICANT CHANGE UP (ref 0–0)
PHOSPHATE SERPL-MCNC: 2.3 MG/DL — LOW (ref 2.5–4.5)
PLATELET # BLD AUTO: 212 K/UL — SIGNIFICANT CHANGE UP (ref 150–400)
POTASSIUM SERPL-MCNC: 3.1 MMOL/L — LOW (ref 3.5–5.3)
POTASSIUM SERPL-SCNC: 3.1 MMOL/L — LOW (ref 3.5–5.3)
RBC # BLD: 3.54 M/UL — LOW (ref 3.8–5.2)
RBC # FLD: 13.2 % — SIGNIFICANT CHANGE UP (ref 10.3–14.5)
SODIUM SERPL-SCNC: 142 MMOL/L — SIGNIFICANT CHANGE UP (ref 135–145)
WBC # BLD: 6.83 K/UL — SIGNIFICANT CHANGE UP (ref 3.8–10.5)
WBC # FLD AUTO: 6.83 K/UL — SIGNIFICANT CHANGE UP (ref 3.8–10.5)

## 2024-04-28 RX ORDER — POTASSIUM CHLORIDE 20 MEQ
40 PACKET (EA) ORAL EVERY 4 HOURS
Refills: 0 | Status: COMPLETED | OUTPATIENT
Start: 2024-04-28 | End: 2024-04-28

## 2024-04-28 RX ORDER — MAGNESIUM SULFATE 500 MG/ML
1 VIAL (ML) INJECTION ONCE
Refills: 0 | Status: COMPLETED | OUTPATIENT
Start: 2024-04-28 | End: 2024-04-28

## 2024-04-28 RX ADMIN — Medication 650 MILLIGRAM(S): at 21:52

## 2024-04-28 RX ADMIN — Medication 40 MILLIEQUIVALENT(S): at 09:07

## 2024-04-28 RX ADMIN — HEPARIN SODIUM 5000 UNIT(S): 5000 INJECTION INTRAVENOUS; SUBCUTANEOUS at 17:50

## 2024-04-28 RX ADMIN — AMLODIPINE BESYLATE 5 MILLIGRAM(S): 2.5 TABLET ORAL at 05:42

## 2024-04-28 RX ADMIN — Medication 6 MILLIGRAM(S): at 20:51

## 2024-04-28 RX ADMIN — ATORVASTATIN CALCIUM 10 MILLIGRAM(S): 80 TABLET, FILM COATED ORAL at 20:55

## 2024-04-28 RX ADMIN — Medication 650 MILLIGRAM(S): at 20:52

## 2024-04-28 RX ADMIN — HEPARIN SODIUM 5000 UNIT(S): 5000 INJECTION INTRAVENOUS; SUBCUTANEOUS at 05:42

## 2024-04-28 RX ADMIN — Medication 25 MICROGRAM(S): at 05:42

## 2024-04-28 RX ADMIN — Medication 1 MILLIGRAM(S): at 17:49

## 2024-04-28 RX ADMIN — LOSARTAN POTASSIUM 50 MILLIGRAM(S): 100 TABLET, FILM COATED ORAL at 05:42

## 2024-04-28 RX ADMIN — Medication 81 MILLIGRAM(S): at 17:49

## 2024-04-28 RX ADMIN — Medication 100 GRAM(S): at 09:07

## 2024-04-28 NOTE — PROGRESS NOTE ADULT - SUBJECTIVE AND OBJECTIVE BOX
Date of service 4/28/24    extended hpi: 89 year old Female with PMH dementia A&Ox2, HTN, HLD, hypothyroid, recent onset of delusions who presents with unresponsive episode      DATE OF SERVICE: 04-28-24    Patient denies chest pain or shortness of breath.   Review of symptoms otherwise negative.    MEDICATIONS:  acetaminophen     Tablet .. 650 milliGRAM(s) Oral every 6 hours PRN  amLODIPine   Tablet 5 milliGRAM(s) Oral daily  aspirin enteric coated 81 milliGRAM(s) Oral daily  atorvastatin 10 milliGRAM(s) Oral at bedtime  folic acid 1 milliGRAM(s) Oral daily  heparin   Injectable 5000 Unit(s) SubCutaneous every 12 hours  influenza  Vaccine (HIGH DOSE) 0.7 milliLiter(s) IntraMuscular once  levothyroxine 25 MICROGram(s) Oral daily  losartan 50 milliGRAM(s) Oral daily  melatonin 6 milliGRAM(s) Oral <User Schedule>  potassium chloride   Powder 40 milliEquivalent(s) Oral every 4 hours      LABS:                        11.7   6.83  )-----------( 212      ( 28 Apr 2024 06:14 )             35.2       Hemoglobin: 11.7 g/dL (04-28 @ 06:14)  Hemoglobin: 11.7 g/dL (04-27 @ 05:57)  Hemoglobin: 13.0 g/dL (04-26 @ 07:00)  Hemoglobin: 12.3 g/dL (04-25 @ 06:30)  Hemoglobin: 12.2 g/dL (04-24 @ 06:25)      04-28    142  |  108<H>  |  19  ----------------------------<  100<H>  3.1<L>   |  18<L>  |  0.66    Ca    8.1<L>      28 Apr 2024 06:14  Phos  2.3     04-28  Mg     1.80     04-28      Creatinine Trend: 0.66<--, 0.80<--, 0.70<--, 0.75<--, 0.77<--, 0.92<--    COAGS:           PHYSICAL EXAM:  T(C): 36.9 (04-28-24 @ 10:58), Max: 37.2 (04-28-24 @ 00:30)  HR: 90 (04-28-24 @ 10:58) (82 - 94)  BP: 142/76 (04-28-24 @ 10:58) (137/69 - 166/93)  RR: 18 (04-28-24 @ 10:58) (17 - 18)  SpO2: 100% (04-28-24 @ 10:58) (98% - 100%)  Wt(kg): --    I&O's Summary          General: Well nourished in no acute distress.   Head: Normocephalic and atraumatic.   Neck: No JVD. No bruits. Supple. Does not appear to be enlarged.   Cardiovascular: + S1,S2 ; RRR Soft systolic murmur at the left lower sternal border. No rubs noted.    Lungs: CTA b/l. No rhonchi, rales or wheezes.   Abdomen: + BS, soft. Non tender. Non distended. No rebound. No guarding.   Extremities: No clubbing/cyanosis/edema.   Neurologic: Moves all four extremities  Skin: Warm and moist. The patient's skin has normal elasticity and good skin turgor.   Psychiatric: Appropriate mood and affect.  Musculoskeletal: Normal range of motion    DATA    TELEMETRY: SR 80	      ECG:  	Sinus bradycardia NS ST-T changes    < from: CT Angio Head w/ IV Cont (04.21.24 @ 12:14) >  IMPRESSION:    CT HEAD:  1.  No evidence of acute intracranial hemorrhage or midline shift.  2.  Subacute to chronic appearing infarcts in the bilateral basal ganglia.  3.  Chronic small vessel disease.    CTA BRAIN:  1.  Multifocal severe stenosis in the left A2 segment with distal   reconstitution.  2.  Moderate to severe focal narrowing in the proximal right M1 segment.  3.  Other focal mild tomoderate luminal narrowings in the major   intracranial vasculature as discussed above.    CTA NECK:  1.  No evidence of critical stenosis by NASCET criteria.  2.  Nodular calcification in the right lobe of the thyroid. Recommend   nonemergent thyroid ultrasound for further characterization if clinically   indicated.  3.  The level degenerative change of the cervical spine, most   significantly at the C4-C5 level where there is severe narrowing of the   spinal canal. Recommend MRI of the cervicalspine for further evaluation.  4.  Subcentimeter nodule in the right lung apex. Recommend follow-up   imaging as per Fleischner criteria.  < end of copied text >      ASSESSMENT/PLAN: 	89 year old Female with PMH dementia A&Ox2, HTN, HLD, hypothyroid, recent onset of delusions who presents with unresponsive episode  --Metoprolol on hold, recommend cont to Hold/DC  --HR stable  --resumed Losartan for HTN,  Norvasc 5mg daily, would continue current dose can increase dose if needed  --MRI pending neuro eval in progress  --not in clinical CHF  --monitor on tele      Scotty Baker MD  Pager: [phone]

## 2024-04-28 NOTE — PROGRESS NOTE ADULT - SUBJECTIVE AND OBJECTIVE BOX
Patient is a 89y old  Female who presents with a chief complaint of unresponsive episode, bradycardia (27 Apr 2024 17:32)    Date of servie : 04-28-24 @ 12:49  INTERVAL HPI/OVERNIGHT EVENTS:  T(C): 36.9 (04-28-24 @ 10:58), Max: 37.2 (04-28-24 @ 00:30)  HR: 90 (04-28-24 @ 10:58) (82 - 94)  BP: 142/76 (04-28-24 @ 10:58) (137/69 - 166/93)  RR: 18 (04-28-24 @ 10:58) (17 - 18)  SpO2: 100% (04-28-24 @ 10:58) (98% - 100%)  Wt(kg): --  I&O's Summary      LABS:                        11.7   6.83  )-----------( 212      ( 28 Apr 2024 06:14 )             35.2     04-28    142  |  108<H>  |  19  ----------------------------<  100<H>  3.1<L>   |  18<L>  |  0.66    Ca    8.1<L>      28 Apr 2024 06:14  Phos  2.3     04-28  Mg     1.80     04-28        Urinalysis Basic - ( 28 Apr 2024 06:14 )    Color: x / Appearance: x / SG: x / pH: x  Gluc: 100 mg/dL / Ketone: x  / Bili: x / Urobili: x   Blood: x / Protein: x / Nitrite: x   Leuk Esterase: x / RBC: x / WBC x   Sq Epi: x / Non Sq Epi: x / Bacteria: x      CAPILLARY BLOOD GLUCOSE            Urinalysis Basic - ( 28 Apr 2024 06:14 )    Color: x / Appearance: x / SG: x / pH: x  Gluc: 100 mg/dL / Ketone: x  / Bili: x / Urobili: x   Blood: x / Protein: x / Nitrite: x   Leuk Esterase: x / RBC: x / WBC x   Sq Epi: x / Non Sq Epi: x / Bacteria: x        MEDICATIONS  (STANDING):  amLODIPine   Tablet 5 milliGRAM(s) Oral daily  aspirin enteric coated 81 milliGRAM(s) Oral daily  atorvastatin 10 milliGRAM(s) Oral at bedtime  folic acid 1 milliGRAM(s) Oral daily  heparin   Injectable 5000 Unit(s) SubCutaneous every 12 hours  influenza  Vaccine (HIGH DOSE) 0.7 milliLiter(s) IntraMuscular once  levothyroxine 25 MICROGram(s) Oral daily  losartan 50 milliGRAM(s) Oral daily  melatonin 6 milliGRAM(s) Oral <User Schedule>  potassium chloride   Powder 40 milliEquivalent(s) Oral every 4 hours    MEDICATIONS  (PRN):  acetaminophen     Tablet .. 650 milliGRAM(s) Oral every 6 hours PRN Temp greater or equal to 38C (100.4F), Mild Pain (1 - 3)          PHYSICAL EXAM:  GENERAL: NAD, well-groomed, well-developed  HEAD:  Atraumatic, Normocephalic  CHEST/LUNG: Clear to percussion bilaterally; No rales, rhonchi, wheezing, or rubs  HEART: Regular rate and rhythm; No murmurs, rubs, or gallops  ABDOMEN: Soft, Nontender, Nondistended; Bowel sounds present  EXTREMITIES:  2+ Peripheral Pulses, No clubbing, cyanosis, or edema  LYMPH: No lymphadenopathy noted  SKIN: No rashes or lesions    Care Discussed with Consultants/Other Providers [ ] YES  [ ] NO

## 2024-04-29 LAB
ANION GAP SERPL CALC-SCNC: 16 MMOL/L — HIGH (ref 7–14)
BASOPHILS # BLD AUTO: 0.02 K/UL — SIGNIFICANT CHANGE UP (ref 0–0.2)
BASOPHILS NFR BLD AUTO: 0.2 % — SIGNIFICANT CHANGE UP (ref 0–2)
BUN SERPL-MCNC: 23 MG/DL — SIGNIFICANT CHANGE UP (ref 7–23)
CALCIUM SERPL-MCNC: 8.1 MG/DL — LOW (ref 8.4–10.5)
CHLORIDE SERPL-SCNC: 110 MMOL/L — HIGH (ref 98–107)
CO2 SERPL-SCNC: 17 MMOL/L — LOW (ref 22–31)
CREAT SERPL-MCNC: 0.66 MG/DL — SIGNIFICANT CHANGE UP (ref 0.5–1.3)
EGFR: 81 ML/MIN/1.73M2 — SIGNIFICANT CHANGE UP
EOSINOPHIL # BLD AUTO: 0.01 K/UL — SIGNIFICANT CHANGE UP (ref 0–0.5)
EOSINOPHIL NFR BLD AUTO: 0.1 % — SIGNIFICANT CHANGE UP (ref 0–6)
GLUCOSE SERPL-MCNC: 141 MG/DL — HIGH (ref 70–99)
HCT VFR BLD CALC: 36.3 % — SIGNIFICANT CHANGE UP (ref 34.5–45)
HGB BLD-MCNC: 12.2 G/DL — SIGNIFICANT CHANGE UP (ref 11.5–15.5)
IANC: 6.39 K/UL — SIGNIFICANT CHANGE UP (ref 1.8–7.4)
IMM GRANULOCYTES NFR BLD AUTO: 0.4 % — SIGNIFICANT CHANGE UP (ref 0–0.9)
LYMPHOCYTES # BLD AUTO: 0.81 K/UL — LOW (ref 1–3.3)
LYMPHOCYTES # BLD AUTO: 9.8 % — LOW (ref 13–44)
MAGNESIUM SERPL-MCNC: 2 MG/DL — SIGNIFICANT CHANGE UP (ref 1.6–2.6)
MCHC RBC-ENTMCNC: 33.2 PG — SIGNIFICANT CHANGE UP (ref 27–34)
MCHC RBC-ENTMCNC: 33.6 GM/DL — SIGNIFICANT CHANGE UP (ref 32–36)
MCV RBC AUTO: 98.9 FL — SIGNIFICANT CHANGE UP (ref 80–100)
MONOCYTES # BLD AUTO: 0.99 K/UL — HIGH (ref 0–0.9)
MONOCYTES NFR BLD AUTO: 12 % — SIGNIFICANT CHANGE UP (ref 2–14)
NEUTROPHILS # BLD AUTO: 6.39 K/UL — SIGNIFICANT CHANGE UP (ref 1.8–7.4)
NEUTROPHILS NFR BLD AUTO: 77.5 % — HIGH (ref 43–77)
NRBC # BLD: 0 /100 WBCS — SIGNIFICANT CHANGE UP (ref 0–0)
NRBC # FLD: 0 K/UL — SIGNIFICANT CHANGE UP (ref 0–0)
PHOSPHATE SERPL-MCNC: 2.5 MG/DL — SIGNIFICANT CHANGE UP (ref 2.5–4.5)
PLATELET # BLD AUTO: 234 K/UL — SIGNIFICANT CHANGE UP (ref 150–400)
POTASSIUM SERPL-MCNC: 3.4 MMOL/L — LOW (ref 3.5–5.3)
POTASSIUM SERPL-SCNC: 3.4 MMOL/L — LOW (ref 3.5–5.3)
RBC # BLD: 3.67 M/UL — LOW (ref 3.8–5.2)
RBC # FLD: 13.2 % — SIGNIFICANT CHANGE UP (ref 10.3–14.5)
SODIUM SERPL-SCNC: 143 MMOL/L — SIGNIFICANT CHANGE UP (ref 135–145)
WBC # BLD: 8.25 K/UL — SIGNIFICANT CHANGE UP (ref 3.8–10.5)
WBC # FLD AUTO: 8.25 K/UL — SIGNIFICANT CHANGE UP (ref 3.8–10.5)

## 2024-04-29 PROCEDURE — 93010 ELECTROCARDIOGRAM REPORT: CPT

## 2024-04-29 RX ORDER — BENZOCAINE AND MENTHOL 5; 1 G/100ML; G/100ML
1 LIQUID ORAL
Refills: 0 | Status: DISCONTINUED | OUTPATIENT
Start: 2024-04-29 | End: 2024-05-02

## 2024-04-29 RX ORDER — POTASSIUM CHLORIDE 20 MEQ
40 PACKET (EA) ORAL EVERY 4 HOURS
Refills: 0 | Status: COMPLETED | OUTPATIENT
Start: 2024-04-29 | End: 2024-04-29

## 2024-04-29 RX ADMIN — Medication 1 MILLIGRAM(S): at 18:25

## 2024-04-29 RX ADMIN — ATORVASTATIN CALCIUM 10 MILLIGRAM(S): 80 TABLET, FILM COATED ORAL at 21:15

## 2024-04-29 RX ADMIN — HEPARIN SODIUM 5000 UNIT(S): 5000 INJECTION INTRAVENOUS; SUBCUTANEOUS at 05:32

## 2024-04-29 RX ADMIN — AMLODIPINE BESYLATE 5 MILLIGRAM(S): 2.5 TABLET ORAL at 05:33

## 2024-04-29 RX ADMIN — Medication 6 MILLIGRAM(S): at 21:15

## 2024-04-29 RX ADMIN — LOSARTAN POTASSIUM 50 MILLIGRAM(S): 100 TABLET, FILM COATED ORAL at 05:33

## 2024-04-29 RX ADMIN — Medication 40 MILLIEQUIVALENT(S): at 13:55

## 2024-04-29 RX ADMIN — HEPARIN SODIUM 5000 UNIT(S): 5000 INJECTION INTRAVENOUS; SUBCUTANEOUS at 18:23

## 2024-04-29 RX ADMIN — Medication 25 MICROGRAM(S): at 04:27

## 2024-04-29 RX ADMIN — Medication 81 MILLIGRAM(S): at 18:25

## 2024-04-29 RX ADMIN — Medication 40 MILLIEQUIVALENT(S): at 08:54

## 2024-04-29 NOTE — PROVIDER CONTACT NOTE (OTHER) - SITUATION
notified by [employer], patient had 17 beats of V-Tach on tele.    , vitals documented, no fever, and no signs and symptoms of pain , vitals documented, no fever, and no signs and symptoms of pain

## 2024-04-29 NOTE — DIETITIAN INITIAL EVALUATION ADULT - PROBLEM/PLAN-8
Has Your Skin Lesion Been Treated?: not been treated
Is This A New Presentation, Or A Follow-Up?: Skin Lesion
DISPLAY PLAN FREE TEXT

## 2024-04-29 NOTE — PROGRESS NOTE ADULT - SUBJECTIVE AND OBJECTIVE BOX
Date of service 4/29/24    extended hpi: 89 year old Female with PMH dementia A&Ox2, HTN, HLD, hypothyroid, recent onset of delusions who presents with unresponsive episode    Patient denies chest pain or shortness of breath.   Review of symptoms otherwise negative.    Review of Systems:   Constitutional: [ ] fevers, [ ] chills.   Skin: [ ] dry skin. [ ] rashes.  Psychiatric: [ ] depression, [ ] anxiety.   Gastrointestinal: [ ] BRBPR, [ ] melena.   Neurological: [ ] confusion. [ ] seizures. [ ] shuffling gait.   Ears,Nose,Mouth and Throat: [ ] ear pain [ ] sore throat.   Eyes: [ ] diplopia.   Respiratory: [ ] hemoptysis. [ ] shortness of breath  Cardiovascular: See HPI above  Hematologic/Lymphatic: [ ] anemia. [ ] painful nodes. [ ] prolonged bleeding.   Genitourinary: [ ] hematuria. [ ] flank pain.   Endocrine: [ ] significant change in weight. [ ] intolerance to heat and cold.     Review of systems [ x] otherwise negative, [ ] otherwise unable to obtain    FH: no family history of sudden cardiac death in first degree relatives    SH: [ ] tobacco, [ ] alcohol, [ ] drugs    acetaminophen     Tablet .. 650 milliGRAM(s) Oral every 6 hours PRN  amLODIPine   Tablet 5 milliGRAM(s) Oral daily  aspirin enteric coated 81 milliGRAM(s) Oral daily  atorvastatin 10 milliGRAM(s) Oral at bedtime  folic acid 1 milliGRAM(s) Oral daily  heparin   Injectable 5000 Unit(s) SubCutaneous every 12 hours  influenza  Vaccine (HIGH DOSE) 0.7 milliLiter(s) IntraMuscular once  levothyroxine 25 MICROGram(s) Oral daily  losartan 50 milliGRAM(s) Oral daily  melatonin 6 milliGRAM(s) Oral <User Schedule>  potassium chloride   Powder 40 milliEquivalent(s) Oral every 4 hours                            12.2   8.25  )-----------( 234      ( 29 Apr 2024 06:25 )             36.3       04-29    143  |  110<H>  |  23  ----------------------------<  141<H>  3.4<L>   |  17<L>  |  0.66    Ca    8.1<L>      29 Apr 2024 06:25  Phos  2.5     04-29  Mg     2.00     04-29      T(C): 36.9 (04-29-24 @ 05:27), Max: 37.6 (04-28-24 @ 19:30)  HR: 89 (04-29-24 @ 06:40) (89 - 98)  BP: 134/69 (04-29-24 @ 06:40) (134/69 - 156/80)  RR: 17 (04-29-24 @ 05:27) (17 - 18)  SpO2: 100% (04-29-24 @ 06:40) (98% - 100%)  Wt(kg): --    I&O's Summary    28 Apr 2024 07:01  -  29 Apr 2024 07:00  --------------------------------------------------------  IN: 350 mL / OUT: 330 mL / NET: 20 mL    General: Well nourished in no acute distress.   Head: Normocephalic and atraumatic.   Neck: No JVD. No bruits. Supple. Does not appear to be enlarged.   Cardiovascular: + S1,S2 ; RRR Soft systolic murmur at the left lower sternal border. No rubs noted.    Lungs: CTA b/l. No rhonchi, rales or wheezes.   Abdomen: + BS, soft. Non tender. Non distended. No rebound. No guarding.   Extremities: No clubbing/cyanosis/edema.   Neurologic: Moves all four extremities  Skin: Warm and moist. The patient's skin has normal elasticity and good skin turgor.   Psychiatric: Appropriate mood and affect.  Musculoskeletal: Normal range of motion    DATA    TELEMETRY: SR 80	      ECG:  	Sinus bradycardia NS ST-T changes    < from: CT Angio Head w/ IV Cont (04.21.24 @ 12:14) >  IMPRESSION:    CT HEAD:  1.  No evidence of acute intracranial hemorrhage or midline shift.  2.  Subacute to chronic appearing infarcts in the bilateral basal ganglia.  3.  Chronic small vessel disease.    CTA BRAIN:  1.  Multifocal severe stenosis in the left A2 segment with distal   reconstitution.  2.  Moderate to severe focal narrowing in the proximal right M1 segment.  3.  Other focal mild tomoderate luminal narrowings in the major   intracranial vasculature as discussed above.    CTA NECK:  1.  No evidence of critical stenosis by NASCET criteria.  2.  Nodular calcification in the right lobe of the thyroid. Recommend   nonemergent thyroid ultrasound for further characterization if clinically   indicated.  3.  The level degenerative change of the cervical spine, most   significantly at the C4-C5 level where there is severe narrowing of the   spinal canal. Recommend MRI of the cervicalspine for further evaluation.  4.  Subcentimeter nodule in the right lung apex. Recommend follow-up   imaging as per Fleischner criteria.  < end of copied text >    < from: MR Head w/wo IV Cont (04.26.24 @ 09:20) >    IMPRESSION:  Degraded by motion.  No evidence of a mass, abnormal enhancement, or current evidence of acute   ischemia.  Chronic ischemic changes as described    < end of copied text >      ASSESSMENT/PLAN: 	89 year old Female with PMH dementia A&Ox2, HTN, HLD, hypothyroid, recent onset of delusions who presents with unresponsive episode    --resumed Losartan and Norvasc 5mg daily, would continue current dose can increase dose if needed  --MRI negative as above  --not in clinical CHF  --tele with SR/ST/PAT, cont to hold BBlocker due to bradycardia on admit  --DC planning to rehab      Olesya ANDUJAR  [phone]

## 2024-04-29 NOTE — DIETITIAN INITIAL EVALUATION ADULT - WEIGHT FOR BMI (LBS)
"Pharmacy Gentamicin Kinetics Note for 2021     42 y.o. female on Gentamicin day # 2    Gentamicin Indication: Maternal fever     Provider specified end date: 21    Active Antibiotics (From admission, onward)    Ordered     Ordering Provider        10:58 PM    21 2258  MD Alert...Gentamicin per Pharmacy  PHARMACY TO DOSE     Question:  Indication(s) for aminoglycoside?  Answer:  Intra-abdominal infection    Pepe Torrez M.D.        10:57 PM    21 2257  gentamicin (GARAMYCIN) 410 mg in  mL IVPB  EVERY 24 HOURS     Note to Pharmacy: Per [facility] Kinetics Protocol    Pepe Torrez M.D.         6:37 PM    21 1837  ampicillin (OMNIPEN) 2,000 mg in  mL IVPB  EVERY 6 HOURS      Ppee Torrez M.D.          Dosing Weight: 82.4 kg (181 lb 10.5 oz)    Admission History: Admitted on 2021 for Indication for care in labor or delivery [O75.9]   Pertinent history: Tmax 100.2F. GBS neg.  Amp/gent were started on  (no note was documented).    Allergies:     Patient has no known allergies.     Pertinent cultures to date:      Results     ** No results found for the last 336 hours. **          Labs:    CrCl cannot be calculated (Patient's most recent lab result is older than the maximum 7 days allowed.).  Recent Labs     21  2230   WBC 12.0*   NEUTSPOLYS 69.90     No results for input(s): BUN, CREATININE, ALBUMIN in the last 72 hours.  No results for input(s): GENTTROUGH, GENTPEAK, GENTRANDOM in the last 72 hours.    Intake/Output Summary (Last 24 hours) at 2021 2310  Last data filed at 2021 2215  Gross per 24 hour   Intake 1500 ml   Output 4200 ml   Net -2700 ml     /57   Pulse 90   Temp 36.4 °C (97.5 °F) (Temporal)   Resp 15   Ht 1.727 m (5' 8\")   Wt 101 kg (223 lb)   SpO2 97%  Temp (24hrs), Av.1 °C (98.8 °F), Min:36.2 °C (97.2 °F), Max:37.9 °C (100.2 °F)      List concerns for Gentamicin clearance: "     Obesity    A/P:     - Gentamicin dose: 410 mg iv q24h (2000)  -Received 1 dose of gentamicin 320 mg on 7/12     - Next gentamicin level: TBD by rounding pharmacist should abx be continued.    - Goal trough: Undetectable    - Comments: Pharmacy will continue to follow and recommend de-escalation of antibiotics as appropriate.        Carly Trejo, PharmD     124.8

## 2024-04-29 NOTE — PROVIDER CONTACT NOTE (OTHER) - ACTION/TREATMENT ORDERED:
Aristeo Toledo; morning labs drawn. Awaiting orders at this time. Care is ongoing Aristeo Toledo; morning labs drawn. Awaiting  orders at this time. Care is ongoing

## 2024-04-29 NOTE — DIETITIAN INITIAL EVALUATION ADULT - PERTINENT LABORATORY DATA
04-29    143  |  110<H>  |  23  ----------------------------<  141<H>  3.4<L>   |  17<L>  |  0.66    Ca    8.1<L>      29 Apr 2024 06:25  Phos  2.5     04-29  Mg     2.00     04-29    A1C with Estimated Average Glucose Result: 6.1 % (04-21-24 @ 10:43)

## 2024-04-29 NOTE — DIETITIAN INITIAL EVALUATION ADULT - ORAL INTAKE PTA/DIET HISTORY
Patient's family by bedside provides information during visit. Family reports patient's usual body weight 133lbs, 3 weeks ago. Patient has good appetite at home, consumes regular food. Per family, patient has shrimp, nuts and fresh fruits(apples, pears, peaches, cherries) allergies, information updated in chart.

## 2024-04-29 NOTE — PROVIDER CONTACT NOTE (OTHER) - BACKGROUND
Patient admitted lethargy and syncope. History of dementia
Patient admitted lethargy and syncope. History of dementia
89 yo F, pmh of hypothyroidism, hypercholesterolemia, htn, depression, p/w unresponsiveness lasting 15 min
Patient admitted lethargy and syncope. History of dementia
89 yo F, pmh of hypothyroidism, hypercholesterolemia, htn, depression, p/w unresponsiveness.   EP: holding BB.

## 2024-04-29 NOTE — DIETITIAN INITIAL EVALUATION ADULT - ADD RECOMMEND
1. Recommend liberalize diet to regular, with no therapeutic restriction, to optimize po intake. Diet consistency defer to team/SLP.   2. Consider a speech and swallow eval to determine diet consistency.   3. Recommend adding Ensure Plus High Protein 8oz 2x/day (700kcal, 40gm pro) for nutrient support.   4. Nutrition department to provide Magic cup 4oz 1x/day (290kcal, 9gm pro) for trial.   5. Monitor weight, labs, po intake and tolerance, bowel movement, skin integrity.   6. Encourage PO intake and honor food preferences as able.

## 2024-04-29 NOTE — DIETITIAN INITIAL EVALUATION ADULT - PERTINENT MEDS FT
MEDICATIONS  (STANDING):  amLODIPine   Tablet 5 milliGRAM(s) Oral daily  aspirin enteric coated 81 milliGRAM(s) Oral daily  atorvastatin 10 milliGRAM(s) Oral at bedtime  folic acid 1 milliGRAM(s) Oral daily  heparin   Injectable 5000 Unit(s) SubCutaneous every 12 hours  influenza  Vaccine (HIGH DOSE) 0.7 milliLiter(s) IntraMuscular once  levothyroxine 25 MICROGram(s) Oral daily  losartan 50 milliGRAM(s) Oral daily  melatonin 6 milliGRAM(s) Oral <User Schedule>    MEDICATIONS  (PRN):  acetaminophen     Tablet .. 650 milliGRAM(s) Oral every 6 hours PRN Temp greater or equal to 38C (100.4F), Mild Pain (1 - 3)  benzocaine/menthol Lozenge 1 Lozenge Oral every 3 hours PRN Sore Throat

## 2024-04-29 NOTE — PROVIDER CONTACT NOTE (OTHER) - ASSESSMENT
Patient A&Ox 1-2. /69, HR 89, O2 saturating 100% on RA. Pt denies complaints of chest pain, SOB, and or discomfort at this time. Patient in no acute signs of distress Patient A&Ox 1-2. /69, HR to 130s, did not sustain. NSR/sinus tachycardic 100-110s overnight. O2 saturating 100% on RA. Pt denies complaints of chest pain, SOB, and or discomfort at this time. Patient in no acute signs of distress

## 2024-04-29 NOTE — PROGRESS NOTE ADULT - SUBJECTIVE AND OBJECTIVE BOX
Patient is a 89y old  Female who presents with a chief complaint of unresponsive episode, bradycardia (29 Apr 2024 11:22)    Date of servie : 04-29-24 @ 12:52  INTERVAL HPI/OVERNIGHT EVENTS:  T(C): 36.3 (04-29-24 @ 11:50), Max: 37.6 (04-28-24 @ 19:30)  HR: 96 (04-29-24 @ 11:50) (89 - 98)  BP: 148/86 (04-29-24 @ 11:50) (134/69 - 156/80)  RR: 17 (04-29-24 @ 11:50) (17 - 18)  SpO2: 100% (04-29-24 @ 11:50) (98% - 100%)  Wt(kg): --  I&O's Summary    28 Apr 2024 07:01  -  29 Apr 2024 07:00  --------------------------------------------------------  IN: 350 mL / OUT: 330 mL / NET: 20 mL        LABS:                        12.2   8.25  )-----------( 234      ( 29 Apr 2024 06:25 )             36.3     04-29    143  |  110<H>  |  23  ----------------------------<  141<H>  3.4<L>   |  17<L>  |  0.66    Ca    8.1<L>      29 Apr 2024 06:25  Phos  2.5     04-29  Mg     2.00     04-29        Urinalysis Basic - ( 29 Apr 2024 06:25 )    Color: x / Appearance: x / SG: x / pH: x  Gluc: 141 mg/dL / Ketone: x  / Bili: x / Urobili: x   Blood: x / Protein: x / Nitrite: x   Leuk Esterase: x / RBC: x / WBC x   Sq Epi: x / Non Sq Epi: x / Bacteria: x      CAPILLARY BLOOD GLUCOSE            Urinalysis Basic - ( 29 Apr 2024 06:25 )    Color: x / Appearance: x / SG: x / pH: x  Gluc: 141 mg/dL / Ketone: x  / Bili: x / Urobili: x   Blood: x / Protein: x / Nitrite: x   Leuk Esterase: x / RBC: x / WBC x   Sq Epi: x / Non Sq Epi: x / Bacteria: x        MEDICATIONS  (STANDING):  amLODIPine   Tablet 5 milliGRAM(s) Oral daily  aspirin enteric coated 81 milliGRAM(s) Oral daily  atorvastatin 10 milliGRAM(s) Oral at bedtime  folic acid 1 milliGRAM(s) Oral daily  heparin   Injectable 5000 Unit(s) SubCutaneous every 12 hours  influenza  Vaccine (HIGH DOSE) 0.7 milliLiter(s) IntraMuscular once  levothyroxine 25 MICROGram(s) Oral daily  losartan 50 milliGRAM(s) Oral daily  melatonin 6 milliGRAM(s) Oral <User Schedule>  potassium chloride   Powder 40 milliEquivalent(s) Oral every 4 hours    MEDICATIONS  (PRN):  acetaminophen     Tablet .. 650 milliGRAM(s) Oral every 6 hours PRN Temp greater or equal to 38C (100.4F), Mild Pain (1 - 3)  benzocaine/menthol Lozenge 1 Lozenge Oral every 3 hours PRN Sore Throat          PHYSICAL EXAM:  GENERAL: NAD, well-groomed, well-developed  HEAD:  Atraumatic, Normocephalic  CHEST/LUNG: Clear to percussion bilaterally; No rales, rhonchi, wheezing, or rubs  HEART: Regular rate and rhythm; No murmurs, rubs, or gallops  ABDOMEN: Soft, Nontender, Nondistended; Bowel sounds present  EXTREMITIES:  2+ Peripheral Pulses, No clubbing, cyanosis, or edema  LYMPH: No lymphadenopathy noted  SKIN: No rashes or lesions    Care Discussed with Consultants/Other Providers [ ] YES  [ ] NO

## 2024-04-29 NOTE — DIETITIAN INITIAL EVALUATION ADULT - OTHER INFO
89F dementia (A&Ox2, walks with walker, eats regular food) from home w/ 24/7 private hire aides, HTN, HLD, hypothyroid, recent onset of delusions who presents with unresponsive episode 4/21 at 911am while sitting down for am breakfast noted to have relative hypotension and bradycarida, admitted for further eval, per chart.     Per family, patient's po intake decreased during hospital stay, fluctuates between 0-75% meal consumption. Per family, patient tolerates pureed diet. Consider a speech and swallow eval to determine diet consistency. No recent episodes of any nausea, vomiting, diarrhea, constipation reported at this time. Last bowel movement 4/27, per RN flow sheet. Per RN flow sheet, weight: 53.1kg/117lbs (4/22), possibly not accurate. RD obtained weight via bed scale during visit: 56.6kg/125lbs. Compared to the reported usual body weight 133lbs, noted patient has significant weight loss of -8lbs/-6%BW x 3 weeks. Family is amendable Ensure and Magic cup for nutrient support. Noted GOC note dated 4/21, patient is DNI/DNR, no feeding tube/ artificial nutrition. Noted low potassium lab- 3.4 (4/29), on KCl for repletion. Patient is on folic acid for micronutrient support.

## 2024-04-30 ENCOUNTER — TRANSCRIPTION ENCOUNTER (OUTPATIENT)
Age: 89
End: 2024-04-30

## 2024-04-30 LAB
ANION GAP SERPL CALC-SCNC: 15 MMOL/L — HIGH (ref 7–14)
BUN SERPL-MCNC: 24 MG/DL — HIGH (ref 7–23)
CALCIUM SERPL-MCNC: 8.2 MG/DL — LOW (ref 8.4–10.5)
CHLORIDE SERPL-SCNC: 117 MMOL/L — HIGH (ref 98–107)
CO2 SERPL-SCNC: 16 MMOL/L — LOW (ref 22–31)
CREAT SERPL-MCNC: 0.65 MG/DL — SIGNIFICANT CHANGE UP (ref 0.5–1.3)
EGFR: 81 ML/MIN/1.73M2 — SIGNIFICANT CHANGE UP
GLUCOSE SERPL-MCNC: 114 MG/DL — HIGH (ref 70–99)
HCT VFR BLD CALC: 33.7 % — LOW (ref 34.5–45)
HGB BLD-MCNC: 11.2 G/DL — LOW (ref 11.5–15.5)
MAGNESIUM SERPL-MCNC: 2.1 MG/DL — SIGNIFICANT CHANGE UP (ref 1.6–2.6)
MCHC RBC-ENTMCNC: 33.2 GM/DL — SIGNIFICANT CHANGE UP (ref 32–36)
MCHC RBC-ENTMCNC: 33.5 PG — SIGNIFICANT CHANGE UP (ref 27–34)
MCV RBC AUTO: 100.9 FL — HIGH (ref 80–100)
NRBC # BLD: 0 /100 WBCS — SIGNIFICANT CHANGE UP (ref 0–0)
NRBC # FLD: 0 K/UL — SIGNIFICANT CHANGE UP (ref 0–0)
PHOSPHATE SERPL-MCNC: 2.7 MG/DL — SIGNIFICANT CHANGE UP (ref 2.5–4.5)
PLATELET # BLD AUTO: 228 K/UL — SIGNIFICANT CHANGE UP (ref 150–400)
POTASSIUM SERPL-MCNC: 4 MMOL/L — SIGNIFICANT CHANGE UP (ref 3.5–5.3)
POTASSIUM SERPL-SCNC: 4 MMOL/L — SIGNIFICANT CHANGE UP (ref 3.5–5.3)
RBC # BLD: 3.34 M/UL — LOW (ref 3.8–5.2)
RBC # FLD: 13.4 % — SIGNIFICANT CHANGE UP (ref 10.3–14.5)
SODIUM SERPL-SCNC: 148 MMOL/L — HIGH (ref 135–145)
WBC # BLD: 8.55 K/UL — SIGNIFICANT CHANGE UP (ref 3.8–10.5)
WBC # FLD AUTO: 8.55 K/UL — SIGNIFICANT CHANGE UP (ref 3.8–10.5)

## 2024-04-30 RX ORDER — SODIUM CHLORIDE 9 MG/ML
1000 INJECTION, SOLUTION INTRAVENOUS
Refills: 0 | Status: DISCONTINUED | OUTPATIENT
Start: 2024-04-30 | End: 2024-05-02

## 2024-04-30 RX ADMIN — AMLODIPINE BESYLATE 5 MILLIGRAM(S): 2.5 TABLET ORAL at 06:06

## 2024-04-30 RX ADMIN — HEPARIN SODIUM 5000 UNIT(S): 5000 INJECTION INTRAVENOUS; SUBCUTANEOUS at 06:06

## 2024-04-30 RX ADMIN — Medication 1 MILLIGRAM(S): at 19:00

## 2024-04-30 RX ADMIN — BENZOCAINE AND MENTHOL 1 LOZENGE: 5; 1 LIQUID ORAL at 18:57

## 2024-04-30 RX ADMIN — HEPARIN SODIUM 5000 UNIT(S): 5000 INJECTION INTRAVENOUS; SUBCUTANEOUS at 18:59

## 2024-04-30 RX ADMIN — Medication 650 MILLIGRAM(S): at 18:57

## 2024-04-30 RX ADMIN — Medication 81 MILLIGRAM(S): at 19:00

## 2024-04-30 RX ADMIN — ATORVASTATIN CALCIUM 10 MILLIGRAM(S): 80 TABLET, FILM COATED ORAL at 21:50

## 2024-04-30 RX ADMIN — Medication 25 MICROGRAM(S): at 04:39

## 2024-04-30 RX ADMIN — LOSARTAN POTASSIUM 50 MILLIGRAM(S): 100 TABLET, FILM COATED ORAL at 06:06

## 2024-04-30 RX ADMIN — SODIUM CHLORIDE 50 MILLILITER(S): 9 INJECTION, SOLUTION INTRAVENOUS at 16:28

## 2024-04-30 RX ADMIN — Medication 6 MILLIGRAM(S): at 21:50

## 2024-04-30 NOTE — BH CONSULTATION LIAISON PROGRESS NOTE - NSBHATTESTBILLING_PSY_A_CORE
57602-Hlmttxuzyu OBS or IP - moderate complexity OR 35-49 mins
83683-Jbmdqqtwtn OBS or IP - moderate complexity OR 35-49 mins
09784-Tjlagxpmeu OBS or IP - moderate complexity OR 35-49 mins
Non-billable

## 2024-04-30 NOTE — SWALLOW BEDSIDE ASSESSMENT ADULT - COMMENTS
Progress Note- Hospitalist 4/29: "89F dementia (A&Ox2, walks with walker, eats regular food) from home w/ 24/7 private hire aides, HTN, HLD, hypothyroid, recent onset of delusions who presents with unresponsive episode 4/21 at 911am while sitting down for am breakfast noted to have relative hypotension and bradycarida, admitted for further eval."    No recent chest imaging available at this time.     Patient seen awake/alert during evaluation with patient's son and daughter-in-law present at bedside. Patient denies difficulty swallowing however reports odynophagia during swallowing only. Patient reports the pain is resolving slowly. Per patient's family, the patient typically eats regular food at home however in the last few days has been requesting puree food due to painful swallowing. Of note, patient declined solid trial with preference for puree solids at this time.

## 2024-04-30 NOTE — DISCHARGE NOTE PROVIDER - HOSPITAL COURSE
89F PMHx HTN, HLD, Hypothyroid, depression, dementia (A&Ox2) p/w unresponsive episode noted to have relative hypotension and bradycardia.    Unresponsiveness  CTA H&N w/ chronic CVA and intracranial stenosis,   MRI Head negative  hold trazodone and risperidone  Neuro: toxic-metabolic encephalopathy vs cardiogenic syncope vs related to underlying dementia    Hypotension and bradycardia  Recent addition of trazodone 4 wks prior and risperidone 1 wk prior --> now on hold  EP: holding BB, family would not want pacing or PPM if indicated as goal is to forego pain and suffering or invasive procedures per her wishes. Overall this event is more likely neurologic in etiology than cardiac given persistent symptoms with sinus rhythm and  normal BP.     Spinal stenosis  Noted on CT in c-spine  OP f/u    Lung and thyroid nodule  CTA incidentally found lung and thyroid nodule  OP f/u if in line w/ GOC.    On _________ , discussed with Dr. Baker, patient is medically cleared and optimized for discharge today to rehab. All medications were reviewed with attending. 89F PMHx HTN, HLD, Hypothyroid, depression, dementia (A&Ox2) p/w unresponsive episode noted to have relative hypotension and bradycardia.    Unresponsiveness  CTA H&N w/ chronic CVA and intracranial stenosis,   MRI Head negative  hold trazodone and risperidone  Neuro: toxic-metabolic encephalopathy vs cardiogenic syncope vs related to underlying dementia    Hypotension and bradycardia  Recent addition of trazodone 4 wks prior and risperidone 1 wk prior --> now on hold  EP: holding BB, family would not want pacing or PPM if indicated as goal is to forego pain and suffering or invasive procedures per her wishes. Overall this event is more likely neurologic in etiology than cardiac given persistent symptoms with sinus rhythm and  normal BP.     Spinal stenosis  Noted on CT in c-spine  OP f/u    Lung and thyroid nodule  CTA incidentally found lung and thyroid nodule  OP f/u if in line w/ GOC.    On 5/2 , discussed with Dr. Baker, patient is medically cleared and optimized for discharge ay   to rehab. All medications were reviewed with attending.

## 2024-04-30 NOTE — BH CONSULTATION LIAISON PROGRESS NOTE - NSBHCONSFOLLOWNEEDS_PSY_ALL_CORE
No psychiatric contraindications to discharge
Needs further psych safety assessment prior to discharge

## 2024-04-30 NOTE — DISCHARGE NOTE PROVIDER - NSDCCPCAREPLAN_GEN_ALL_CORE_FT
PRINCIPAL DISCHARGE DIAGNOSIS  Diagnosis: Unresponsiveness  Assessment and Plan of Treatment: You were seen by neurology and cardiology. You were found to have low heart rate.  Follow up with general neurology at [address] [place] [address] ([phone]).      SECONDARY DISCHARGE DIAGNOSES  Diagnosis: Bradycardia  Assessment and Plan of Treatment: You were seen by cardiology    Diagnosis: HTN (hypertension)  Assessment and Plan of Treatment: Continue blood pressure medication regimen as directed. Monitor for any visual changes, headaches or dizziness.  Monitor blood pressure regularly.  Follow up with your primary care provider or cardiologist for further management for high blood pressure.      Diagnosis: HLD (hyperlipidemia)  Assessment and Plan of Treatment: Continue cholesterol control medications. Continue DASH diet. Follow up with your PCP within 1 week of discharge for further management and monitoring of lipid and cholesterol panels. Please call to make an appointment      Diagnosis: Spinal stenosis  Assessment and Plan of Treatment: Noted on CT in c-spine. Follow up outpatient.    Diagnosis: Imaging abnormalities  Assessment and Plan of Treatment: CTA incidentally found lung and thyroid nodule. Please follow-up outpatient.     PRINCIPAL DISCHARGE DIAGNOSIS  Diagnosis: Unresponsiveness  Assessment and Plan of Treatment: You were seen by neurology and cardiology. You were found to have low heart rate.  Follow up with general neurology at [address] [place] [address] ([phone]).      SECONDARY DISCHARGE DIAGNOSES  Diagnosis: Bradycardia  Assessment and Plan of Treatment: You were seen by cardiology _______________    Diagnosis: HTN (hypertension)  Assessment and Plan of Treatment: Continue blood pressure medication regimen as directed. Monitor for any visual changes, headaches or dizziness.  Monitor blood pressure regularly.  Follow up with your primary care provider or cardiologist for further management for high blood pressure.      Diagnosis: HLD (hyperlipidemia)  Assessment and Plan of Treatment: Continue cholesterol control medications. Continue DASH diet. Follow up with your PCP within 1 week of discharge for further management and monitoring of lipid and cholesterol panels. Please call to make an appointment      Diagnosis: Spinal stenosis  Assessment and Plan of Treatment: Noted on CT in c-spine. Follow up outpatient.    Diagnosis: Imaging abnormalities  Assessment and Plan of Treatment: CTA incidentally found lung and thyroid nodule. Please follow-up outpatient.    Diagnosis: Dementia  Assessment and Plan of Treatment:      PRINCIPAL DISCHARGE DIAGNOSIS  Diagnosis: Unresponsiveness  Assessment and Plan of Treatment: You were seen by neurology and cardiology. You were found to have low heart rate.  Follow up with general neurology at [address] [place] [address] ([phone]).      SECONDARY DISCHARGE DIAGNOSES  Diagnosis: Bradycardia  Assessment and Plan of Treatment: You were seen by cardiology amd evaluated.  Continue therapy as directed  Follow up with your cardiologist outpatient    Diagnosis: Spinal stenosis  Assessment and Plan of Treatment: Noted on CT in c-spine. Follow up outpatient.    Diagnosis: HTN (hypertension)  Assessment and Plan of Treatment: Continue blood pressure medication regimen as directed. Monitor for any visual changes, headaches or dizziness.  Monitor blood pressure regularly.  Follow up with your primary care provider or cardiologist for further management for high blood pressure.      Diagnosis: HLD (hyperlipidemia)  Assessment and Plan of Treatment: Continue cholesterol control medications. Continue DASH diet. Follow up with your PCP within 1 week of discharge for further management and monitoring of lipid and cholesterol panels. Please call to make an appointment      Diagnosis: Imaging abnormalities  Assessment and Plan of Treatment: CTA incidentally found lung and thyroid nodule. Please follow-up outpatient.    Diagnosis: Dementia  Assessment and Plan of Treatment:

## 2024-04-30 NOTE — BH CONSULTATION LIAISON PROGRESS NOTE - NSBHCONSULTFOLLOWAFTERCARE_PSY_A_CORE FT
Follow up with outpatient geriatrist  and   [facility] [phone]      [facility] Outpatient services  For acute crisis: [facility]  [address] [facility], [address], [place] [address]  [phone]  [email or web address]

## 2024-04-30 NOTE — BH CONSULTATION LIAISON PROGRESS NOTE - CURRENT MEDICATION
MEDICATIONS  (STANDING):  amLODIPine   Tablet 5 milliGRAM(s) Oral daily  aspirin enteric coated 81 milliGRAM(s) Oral daily  atorvastatin 10 milliGRAM(s) Oral at bedtime  dextrose 5%. 1000 milliLiter(s) (50 mL/Hr) IV Continuous <Continuous>  folic acid 1 milliGRAM(s) Oral daily  heparin   Injectable 5000 Unit(s) SubCutaneous every 12 hours  influenza  Vaccine (HIGH DOSE) 0.7 milliLiter(s) IntraMuscular once  levothyroxine 25 MICROGram(s) Oral daily  losartan 50 milliGRAM(s) Oral daily  melatonin 6 milliGRAM(s) Oral <User Schedule>    MEDICATIONS  (PRN):  acetaminophen     Tablet .. 650 milliGRAM(s) Oral every 6 hours PRN Temp greater or equal to 38C (100.4F), Mild Pain (1 - 3)  benzocaine/menthol Lozenge 1 Lozenge Oral every 3 hours PRN Sore Throat  
MEDICATIONS  (STANDING):  aspirin enteric coated 81 milliGRAM(s) Oral daily  atorvastatin 10 milliGRAM(s) Oral at bedtime  folic acid 1 milliGRAM(s) Oral daily  heparin   Injectable 5000 Unit(s) SubCutaneous every 12 hours  influenza  Vaccine (HIGH DOSE) 0.7 milliLiter(s) IntraMuscular once  levothyroxine 25 MICROGram(s) Oral daily  losartan 50 milliGRAM(s) Oral daily  melatonin 6 milliGRAM(s) Oral <User Schedule>    MEDICATIONS  (PRN):  acetaminophen     Tablet .. 650 milliGRAM(s) Oral every 6 hours PRN Temp greater or equal to 38C (100.4F), Mild Pain (1 - 3)  
MEDICATIONS  (STANDING):  amLODIPine   Tablet 5 milliGRAM(s) Oral daily  aspirin enteric coated 81 milliGRAM(s) Oral daily  atorvastatin 10 milliGRAM(s) Oral at bedtime  folic acid 1 milliGRAM(s) Oral daily  heparin   Injectable 5000 Unit(s) SubCutaneous every 12 hours  influenza  Vaccine (HIGH DOSE) 0.7 milliLiter(s) IntraMuscular once  levothyroxine 25 MICROGram(s) Oral daily  losartan 50 milliGRAM(s) Oral daily  melatonin 6 milliGRAM(s) Oral <User Schedule>    MEDICATIONS  (PRN):  acetaminophen     Tablet .. 650 milliGRAM(s) Oral every 6 hours PRN Temp greater or equal to 38C (100.4F), Mild Pain (1 - 3)  OLANZapine 1.25 milliGRAM(s) Oral every 6 hours PRN agitation  OLANZapine Injectable 1.25 milliGRAM(s) IntraMuscular every 6 hours PRN agitation  
MEDICATIONS  (STANDING):  amLODIPine   Tablet 5 milliGRAM(s) Oral daily  aspirin enteric coated 81 milliGRAM(s) Oral daily  atorvastatin 10 milliGRAM(s) Oral at bedtime  folic acid 1 milliGRAM(s) Oral daily  heparin   Injectable 5000 Unit(s) SubCutaneous every 12 hours  influenza  Vaccine (HIGH DOSE) 0.7 milliLiter(s) IntraMuscular once  levothyroxine 25 MICROGram(s) Oral daily  losartan 50 milliGRAM(s) Oral daily  melatonin 6 milliGRAM(s) Oral <User Schedule>    MEDICATIONS  (PRN):  acetaminophen     Tablet .. 650 milliGRAM(s) Oral every 6 hours PRN Temp greater or equal to 38C (100.4F), Mild Pain (1 - 3)

## 2024-04-30 NOTE — BH CONSULTATION LIAISON PROGRESS NOTE - NSBHCHARTREVIEWVS_PSY_A_CORE FT
Vital Signs Last 24 Hrs  T(C): 36.3 (30 Apr 2024 06:02), Max: 37.3 (29 Apr 2024 20:21)  T(F): 97.4 (30 Apr 2024 06:02), Max: 99.2 (29 Apr 2024 20:21)  HR: 87 (30 Apr 2024 06:02) (87 - 90)  BP: 151/72 (30 Apr 2024 06:02) (137/72 - 151/72)  BP(mean): --  RR: 18 (30 Apr 2024 06:02) (17 - 18)  SpO2: 97% (30 Apr 2024 06:02) (97% - 99%)    Parameters below as of 30 Apr 2024 06:02  Patient On (Oxygen Delivery Method): room air    
Vital Signs Last 24 Hrs  T(C): 36.6 (25 Apr 2024 04:40), Max: 36.9 (24 Apr 2024 20:02)  T(F): 97.8 (25 Apr 2024 04:40), Max: 98.5 (24 Apr 2024 20:02)  HR: 83 (25 Apr 2024 05:40) (83 - 101)  BP: 150/64 (25 Apr 2024 05:40) (150/64 - 174/91)  BP(mean): --  RR: 18 (25 Apr 2024 04:40) (18 - 18)  SpO2: 97% (25 Apr 2024 04:40) (95% - 97%)    Parameters below as of 25 Apr 2024 04:40  Patient On (Oxygen Delivery Method): room air    
Vital Signs Last 24 Hrs  T(C): 36.9 (26 Apr 2024 11:45), Max: 37.1 (25 Apr 2024 20:31)  T(F): 98.5 (26 Apr 2024 11:45), Max: 98.7 (25 Apr 2024 20:31)  HR: 97 (26 Apr 2024 11:45) (85 - 130)  BP: 160/90 (26 Apr 2024 11:45) (146/82 - 160/90)  BP(mean): --  RR: 18 (26 Apr 2024 11:45) (18 - 19)  SpO2: 100% (26 Apr 2024 11:45) (97% - 100%)    Parameters below as of 26 Apr 2024 11:45  Patient On (Oxygen Delivery Method): room air    
Vital Signs Last 24 Hrs  T(C): 36.9 (24 Apr 2024 10:07), Max: 36.9 (24 Apr 2024 10:07)  T(F): 98.4 (24 Apr 2024 10:07), Max: 98.4 (24 Apr 2024 10:07)  HR: 86 (24 Apr 2024 10:07) (79 - 86)  BP: 158/79 (24 Apr 2024 10:07) (127/60 - 158/79)  BP(mean): --  RR: 18 (24 Apr 2024 10:07) (18 - 18)  SpO2: 95% (24 Apr 2024 10:07) (95% - 100%)    Parameters below as of 24 Apr 2024 05:40  Patient On (Oxygen Delivery Method): room air
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
## 2024-04-30 NOTE — BH CONSULTATION LIAISON PROGRESS NOTE - ATTENDING COMMENTS
Chart reviewed. D/W trainee. Agree with above assessment/recs. Will sign off given low psych acuity. Can follow with outpt [facility]-psych per family request.

## 2024-04-30 NOTE — SWALLOW BEDSIDE ASSESSMENT ADULT - SWALLOW EVAL: RECOMMENDED FEEDING/EATING TECHNIQUES
Swallowing Guidelines: Seated Upright during Mealtimes; Slow Pacing; Small Bites/Sips; Maintain Adequate Oral Hygiene

## 2024-04-30 NOTE — DISCHARGE NOTE PROVIDER - NSDCMRMEDTOKEN_GEN_ALL_CORE_FT
amLODIPine 5 mg oral tablet: 1 tab(s) orally 2 times a day  atorvastatin 10 mg oral tablet: 1 tab(s) orally once a day (at bedtime)  Folic acid:   gemfibrozil 600 mg oral tablet: 1 tab(s) orally once a day  hydroCHLOROthiazide 12.5 mg oral tablet: 1 tab(s) orally once a day  levothyroxine 25 mcg (0.025 mg) oral tablet: 1 tab(s) orally once a day  losartan 100 mg oral tablet: 1 tab(s) orally once a day  Metoprolol Succinate ER 25 mg oral tablet, extended release: 1 tab(s) orally once a day  risperiDONE 0.25 mg oral tablet: 0.5 tab(s) orally once a day  traZODone 50 mg oral tablet: 1 orally once a day (at bedtime)   amLODIPine 5 mg oral tablet: 1 tab(s) orally 2 times a day  atorvastatin 10 mg oral tablet: 1 tab(s) orally once a day (at bedtime)  Folic acid:   gemfibrozil 600 mg oral tablet: 1 tab(s) orally once a day  hydroCHLOROthiazide 12.5 mg oral tablet: 1 tab(s) orally once a day  levothyroxine 25 mcg (0.025 mg) oral tablet: 1 tab(s) orally once a day  losartan 100 mg oral tablet: 1 tab(s) orally once a day  Metoprolol Succinate ER 25 mg oral tablet, extended release: 1 tab(s) orally once a day  risperiDONE 0.25 mg oral tablet: 0.5 tab(s) orally once a day   amLODIPine 5 mg oral tablet: 1 tab(s) orally 2 times a day  aspirin 81 mg oral delayed release tablet: 1 tab(s) orally once a day  atorvastatin 10 mg oral tablet: 1 tab(s) orally once a day (at bedtime)  folic acid 1 mg oral tablet: 1 tab(s) orally once a day  gemfibrozil 600 mg oral tablet: 1 tab(s) orally once a day  levothyroxine 25 mcg (0.025 mg) oral tablet: 1 tab(s) orally once a day  losartan 50 mg oral tablet: 1 tab(s) orally once a day  melatonin 3 mg oral tablet: 2 tab(s) orally once a day (at bedtime)  traZODone 50 mg oral tablet: 0.5 tab(s) orally once a day (at bedtime)

## 2024-04-30 NOTE — DISCHARGE NOTE PROVIDER - NSDCFUSCHEDAPPT_GEN_ALL_CORE_FT
[facility] Physician [facility]  GERIATRICS [address] [place] [address]   Scheduled Appointment: 05/01/2024     Alda Gonzalez  Health system Physician [facility]  GERIATRICS [address] [place] [address]   Scheduled Appointment: 05/10/2024

## 2024-04-30 NOTE — DISCHARGE NOTE PROVIDER - PROVIDER TOKENS
PROVIDER:[TOKEN:[[id number]]],PROVIDER:[TOKEN:[[id number]]],PROVIDER:[TOKEN:[[id number]]]

## 2024-04-30 NOTE — DISCHARGE NOTE PROVIDER - CARE PROVIDER_API CALL
Alda Gonzalez  Internal Medicine  [address] [facility], [address]  [place] [address]  Phone: [phone]  Fax: [phone]  Follow Up Time:     Srinivas Quezada  Internal Medicine  [id number] [place] [address], [address] [place] [address]  Phone: [phone]  Fax: [phone]  Follow Up Time:     Scotty Baker  Interventional Cardiology  [address] [facility], [id number]  [place] [address]  Phone: [phone]  Fax: [phone]  Follow Up Time:

## 2024-04-30 NOTE — BH CONSULTATION LIAISON PROGRESS NOTE - NSBHFUPINTERVALHXFT_PSY_A_CORE
Chart, labs, imagine reviewed. Case discussed with the primary team. Patient seen at bedside. No PRNs  Pt was seen at bedside with her son and daughter in law present. Pt is calm, cooperative. Reported having good mood. Oriented to self, place and situation, does not know the date. Has good attention. Denied any delusional thoughts, suicidal thoughts. Reported poor sleep and nightmares " of things of the past". Family at bedside reported that trazodone used to help her sleep.

## 2024-04-30 NOTE — SWALLOW BEDSIDE ASSESSMENT ADULT - ADDITIONAL RECOMMENDATIONS
This department to follow up as schedule permits to assess for diet advancement. Medical team further advised to reconsult if there is a change in medical status and/or observed change in patient's tolerance of recommended PO diet.

## 2024-04-30 NOTE — PROGRESS NOTE ADULT - SUBJECTIVE AND OBJECTIVE BOX
Date of service 4/30/24    extended hpi: 89 year old Female with PMH dementia A&Ox2, HTN, HLD, hypothyroid, recent onset of delusions who presents with unresponsive episode    Patient denies chest pain or shortness of breath.   Review of symptoms otherwise negative.    Review of Systems:   Constitutional: [ ] fevers, [ ] chills.   Skin: [ ] dry skin. [ ] rashes.  Psychiatric: [ ] depression, [ ] anxiety.   Gastrointestinal: [ ] BRBPR, [ ] melena.   Neurological: [ ] confusion. [ ] seizures. [ ] shuffling gait.   Ears,Nose,Mouth and Throat: [ ] ear pain [ ] sore throat.   Eyes: [ ] diplopia.   Respiratory: [ ] hemoptysis. [ ] shortness of breath  Cardiovascular: See HPI above  Hematologic/Lymphatic: [ ] anemia. [ ] painful nodes. [ ] prolonged bleeding.   Genitourinary: [ ] hematuria. [ ] flank pain.   Endocrine: [ ] significant change in weight. [ ] intolerance to heat and cold.     Review of systems [x ] otherwise negative, [ ] otherwise unable to obtain    FH: no family history of sudden cardiac death in first degree relatives    SH: [ ] tobacco, [ ] alcohol, [ ] drugs    acetaminophen     Tablet .. 650 milliGRAM(s) Oral every 6 hours PRN  amLODIPine   Tablet 5 milliGRAM(s) Oral daily  aspirin enteric coated 81 milliGRAM(s) Oral daily  atorvastatin 10 milliGRAM(s) Oral at bedtime  benzocaine/menthol Lozenge 1 Lozenge Oral every 3 hours PRN  folic acid 1 milliGRAM(s) Oral daily  heparin   Injectable 5000 Unit(s) SubCutaneous every 12 hours  influenza  Vaccine (HIGH DOSE) 0.7 milliLiter(s) IntraMuscular once  levothyroxine 25 MICROGram(s) Oral daily  losartan 50 milliGRAM(s) Oral daily  melatonin 6 milliGRAM(s) Oral <User Schedule>                            11.2   8.55  )-----------( 228      ( 30 Apr 2024 06:50 )             33.7       04-30    148<H>  |  117<H>  |  24<H>  ----------------------------<  114<H>  4.0   |  16<L>  |  0.65    Ca    8.2<L>      30 Apr 2024 06:50  Phos  2.7     04-30  Mg     2.10     04-30      T(C): 36.3 (04-30-24 @ 06:02), Max: 37.3 (04-29-24 @ 20:21)  HR: 87 (04-30-24 @ 06:02) (87 - 96)  BP: 151/72 (04-30-24 @ 06:02) (137/72 - 151/72)  RR: 18 (04-30-24 @ 06:02) (17 - 18)  SpO2: 97% (04-30-24 @ 06:02) (97% - 100%)  Wt(kg): --    General: Well nourished in no acute distress.   Head: Normocephalic and atraumatic.   Neck: No JVD. No bruits. Supple. Does not appear to be enlarged.   Cardiovascular: + S1,S2 ; RRR Soft systolic murmur at the left lower sternal border. No rubs noted.    Lungs: CTA b/l. No rhonchi, rales or wheezes.   Abdomen: + BS, soft. Non tender. Non distended. No rebound. No guarding.   Extremities: No clubbing/cyanosis/edema.   Neurologic: Moves all four extremities  Skin: Warm and moist. The patient's skin has normal elasticity and good skin turgor.   Psychiatric: Appropriate mood and affect.  Musculoskeletal: Normal range of motion    DATA    TELEMETRY: SR 80, brief PAT	      ECG:  	Sinus bradycardia NS ST-T changes    < from: CT Angio Head w/ IV Cont (04.21.24 @ 12:14) >  IMPRESSION:    CT HEAD:  1.  No evidence of acute intracranial hemorrhage or midline shift.  2.  Subacute to chronic appearing infarcts in the bilateral basal ganglia.  3.  Chronic small vessel disease.    CTA BRAIN:  1.  Multifocal severe stenosis in the left A2 segment with distal   reconstitution.  2.  Moderate to severe focal narrowing in the proximal right M1 segment.  3.  Other focal mild tomoderate luminal narrowings in the major   intracranial vasculature as discussed above.    CTA NECK:  1.  No evidence of critical stenosis by NASCET criteria.  2.  Nodular calcification in the right lobe of the thyroid. Recommend   nonemergent thyroid ultrasound for further characterization if clinically   indicated.  3.  The level degenerative change of the cervical spine, most   significantly at the C4-C5 level where there is severe narrowing of the   spinal canal. Recommend MRI of the cervicalspine for further evaluation.  4.  Subcentimeter nodule in the right lung apex. Recommend follow-up   imaging as per Fleischner criteria.  < end of copied text >    < from: MR Head w/wo IV Cont (04.26.24 @ 09:20) >    IMPRESSION:  Degraded by motion.  No evidence of a mass, abnormal enhancement, or current evidence of acute   ischemia.  Chronic ischemic changes as described    < end of copied text >      ASSESSMENT/PLAN: 	89 year old Female with PMH dementia A&Ox2, HTN, HLD, hypothyroid, recent onset of delusions who presents with unresponsive episode    --cont Norvasc and Losartan for HTN  --MRI negative as above  --not in clinical CHF  --tele with SR/ST/PAT, brief, cont to hold BBlocker due to bradycardia on admit  --if PAT persists/becomes symptomatic, can trial low dose BBlocker  --DC planning to rehab      Olesya ANDUJAR  [phone]

## 2024-04-30 NOTE — BH CONSULTATION LIAISON PROGRESS NOTE - NSBHFUPREASONCONS_PSY_A_CORE
delirium/dementia/psychosis

## 2024-04-30 NOTE — BH CONSULTATION LIAISON PROGRESS NOTE - NSBHCHARTREVIEWLAB_PSY_A_CORE FT
12.3   10.05 )-----------( 173      ( 25 Apr 2024 06:30 )             35.4     04-25    144  |  109<H>  |  25<H>  ----------------------------<  109<H>  3.5   |  18<L>  |  0.75    Ca    8.7      25 Apr 2024 06:30  Phos  2.7     04-25  Mg     1.80     04-25    
                        12.3   10.05 )-----------( 173      ( 25 Apr 2024 06:30 )             35.4     04-25    144  |  109<H>  |  25<H>  ----------------------------<  109<H>  3.5   |  18<L>  |  0.75    Ca    8.7      25 Apr 2024 06:30  Phos  2.7     04-25  Mg     1.80     04-25    
                      12.2   9.66  )-----------( 168      ( 24 Apr 2024 06:25 )             35.5     04-24    145  |  111<H>  |  26<H>  ----------------------------<  95  3.8   |  18<L>  |  0.77    Ca    9.1      24 Apr 2024 06:25  Phos  1.8     04-24  Mg     1.90     04-24    
                        12.3   10.05 )-----------( 173      ( 25 Apr 2024 06:30 )             35.4     04-25    144  |  109<H>  |  25<H>  ----------------------------<  109<H>  3.5   |  18<L>  |  0.75    Ca    8.7      25 Apr 2024 06:30  Phos  2.7     04-25  Mg     1.80     04-25

## 2024-04-30 NOTE — PROGRESS NOTE ADULT - SUBJECTIVE AND OBJECTIVE BOX
Patient is a 89y old  Female who presents with a chief complaint of unresponsive episode, bradycardia (30 Apr 2024 11:22)    Date of servie : 04-30-24 @ 16:31  INTERVAL HPI/OVERNIGHT EVENTS:  T(C): 36.6 (04-30-24 @ 15:07), Max: 37.3 (04-29-24 @ 20:21)  HR: 91 (04-30-24 @ 15:07) (87 - 91)  BP: 138/78 (04-30-24 @ 15:07) (137/72 - 151/72)  RR: 17 (04-30-24 @ 15:07) (17 - 18)  SpO2: 98% (04-30-24 @ 15:07) (97% - 99%)  Wt(kg): --  I&O's Summary      LABS:                        11.2   8.55  )-----------( 228      ( 30 Apr 2024 06:50 )             33.7     04-30    148<H>  |  117<H>  |  24<H>  ----------------------------<  114<H>  4.0   |  16<L>  |  0.65    Ca    8.2<L>      30 Apr 2024 06:50  Phos  2.7     04-30  Mg     2.10     04-30        Urinalysis Basic - ( 30 Apr 2024 06:50 )    Color: x / Appearance: x / SG: x / pH: x  Gluc: 114 mg/dL / Ketone: x  / Bili: x / Urobili: x   Blood: x / Protein: x / Nitrite: x   Leuk Esterase: x / RBC: x / WBC x   Sq Epi: x / Non Sq Epi: x / Bacteria: x      CAPILLARY BLOOD GLUCOSE            Urinalysis Basic - ( 30 Apr 2024 06:50 )    Color: x / Appearance: x / SG: x / pH: x  Gluc: 114 mg/dL / Ketone: x  / Bili: x / Urobili: x   Blood: x / Protein: x / Nitrite: x   Leuk Esterase: x / RBC: x / WBC x   Sq Epi: x / Non Sq Epi: x / Bacteria: x        MEDICATIONS  (STANDING):  amLODIPine   Tablet 5 milliGRAM(s) Oral daily  aspirin enteric coated 81 milliGRAM(s) Oral daily  atorvastatin 10 milliGRAM(s) Oral at bedtime  dextrose 5%. 1000 milliLiter(s) (50 mL/Hr) IV Continuous <Continuous>  folic acid 1 milliGRAM(s) Oral daily  heparin   Injectable 5000 Unit(s) SubCutaneous every 12 hours  influenza  Vaccine (HIGH DOSE) 0.7 milliLiter(s) IntraMuscular once  levothyroxine 25 MICROGram(s) Oral daily  losartan 50 milliGRAM(s) Oral daily  melatonin 6 milliGRAM(s) Oral <User Schedule>    MEDICATIONS  (PRN):  acetaminophen     Tablet .. 650 milliGRAM(s) Oral every 6 hours PRN Temp greater or equal to 38C (100.4F), Mild Pain (1 - 3)  benzocaine/menthol Lozenge 1 Lozenge Oral every 3 hours PRN Sore Throat          PHYSICAL EXAM:  GENERAL: NAD, well-groomed, well-developed  HEAD:  Atraumatic, Normocephalic  CHEST/LUNG: Clear to percussion bilaterally; No rales, rhonchi, wheezing, or rubs  HEART: Regular rate and rhythm; No murmurs, rubs, or gallops  ABDOMEN: Soft, Nontender, Nondistended; Bowel sounds present  EXTREMITIES:  2+ Peripheral Pulses, No clubbing, cyanosis, or edema  LYMPH: No lymphadenopathy noted  SKIN: No rashes or lesions    Care Discussed with Consultants/Other Providers [ ] YES  [ ] NO

## 2024-04-30 NOTE — SWALLOW BEDSIDE ASSESSMENT ADULT - SWALLOW EVAL: DIAGNOSIS
1- Functional oral stage for puree and thin liquids marked by adequate oral containment, adequate bolus manipulation with adequate anterior to posterior transfer, and adequate oral clearance. 2- Functional pharyngeal stage for puree and thin liquids marked by initiation of the pharyngeal swallow with hyolaryngeal excursion upon palpation. Patient noted with intermittent wincing during the swallow and reports "I have some pain when I swallow but not when I'm not swallowing."

## 2024-05-01 ENCOUNTER — APPOINTMENT (OUTPATIENT)
Dept: GERIATRICS | Facility: CLINIC | Age: 89
End: 2024-05-01

## 2024-05-01 PROBLEM — Z86.59 PERSONAL HISTORY OF OTHER MENTAL AND BEHAVIORAL DISORDERS: Chronic | Status: ACTIVE | Noted: 2024-04-21

## 2024-05-01 PROBLEM — E03.9 HYPOTHYROIDISM, UNSPECIFIED: Chronic | Status: ACTIVE | Noted: 2024-04-21

## 2024-05-01 PROBLEM — E78.5 HYPERLIPIDEMIA, UNSPECIFIED: Chronic | Status: ACTIVE | Noted: 2024-04-21

## 2024-05-01 PROBLEM — F03.90 UNSPECIFIED DEMENTIA WITHOUT BEHAVIORAL DISTURBANCE: Chronic | Status: ACTIVE | Noted: 2024-04-21

## 2024-05-01 PROBLEM — I10 ESSENTIAL (PRIMARY) HYPERTENSION: Chronic | Status: ACTIVE | Noted: 2024-04-21

## 2024-05-01 LAB
ANION GAP SERPL CALC-SCNC: 11 MMOL/L — SIGNIFICANT CHANGE UP (ref 7–14)
BUN SERPL-MCNC: 23 MG/DL — SIGNIFICANT CHANGE UP (ref 7–23)
CALCIUM SERPL-MCNC: 8.1 MG/DL — LOW (ref 8.4–10.5)
CHLORIDE SERPL-SCNC: 112 MMOL/L — HIGH (ref 98–107)
CO2 SERPL-SCNC: 20 MMOL/L — LOW (ref 22–31)
CREAT SERPL-MCNC: 0.61 MG/DL — SIGNIFICANT CHANGE UP (ref 0.5–1.3)
EGFR: 82 ML/MIN/1.73M2 — SIGNIFICANT CHANGE UP
GLUCOSE SERPL-MCNC: 112 MG/DL — HIGH (ref 70–99)
HCT VFR BLD CALC: 33.4 % — LOW (ref 34.5–45)
HGB BLD-MCNC: 11.3 G/DL — LOW (ref 11.5–15.5)
MAGNESIUM SERPL-MCNC: 1.9 MG/DL — SIGNIFICANT CHANGE UP (ref 1.6–2.6)
MCHC RBC-ENTMCNC: 33.8 GM/DL — SIGNIFICANT CHANGE UP (ref 32–36)
MCHC RBC-ENTMCNC: 33.9 PG — SIGNIFICANT CHANGE UP (ref 27–34)
MCV RBC AUTO: 100.3 FL — HIGH (ref 80–100)
NRBC # BLD: 0 /100 WBCS — SIGNIFICANT CHANGE UP (ref 0–0)
NRBC # FLD: 0 K/UL — SIGNIFICANT CHANGE UP (ref 0–0)
PHOSPHATE SERPL-MCNC: 3.3 MG/DL — SIGNIFICANT CHANGE UP (ref 2.5–4.5)
PLATELET # BLD AUTO: 232 K/UL — SIGNIFICANT CHANGE UP (ref 150–400)
POTASSIUM SERPL-MCNC: 3.6 MMOL/L — SIGNIFICANT CHANGE UP (ref 3.5–5.3)
POTASSIUM SERPL-SCNC: 3.6 MMOL/L — SIGNIFICANT CHANGE UP (ref 3.5–5.3)
RBC # BLD: 3.33 M/UL — LOW (ref 3.8–5.2)
RBC # FLD: 13.6 % — SIGNIFICANT CHANGE UP (ref 10.3–14.5)
SODIUM SERPL-SCNC: 143 MMOL/L — SIGNIFICANT CHANGE UP (ref 135–145)
WBC # BLD: 8.14 K/UL — SIGNIFICANT CHANGE UP (ref 3.8–10.5)
WBC # FLD AUTO: 8.14 K/UL — SIGNIFICANT CHANGE UP (ref 3.8–10.5)

## 2024-05-01 RX ORDER — TRAZODONE HCL 50 MG
1 TABLET ORAL
Refills: 0 | DISCHARGE

## 2024-05-01 RX ORDER — TRAZODONE HCL 50 MG
25 TABLET ORAL AT BEDTIME
Refills: 0 | Status: DISCONTINUED | OUTPATIENT
Start: 2024-05-01 | End: 2024-05-02

## 2024-05-01 RX ADMIN — Medication 6 MILLIGRAM(S): at 21:41

## 2024-05-01 RX ADMIN — Medication 1 MILLIGRAM(S): at 11:35

## 2024-05-01 RX ADMIN — Medication 25 MILLIGRAM(S): at 21:41

## 2024-05-01 RX ADMIN — HEPARIN SODIUM 5000 UNIT(S): 5000 INJECTION INTRAVENOUS; SUBCUTANEOUS at 17:03

## 2024-05-01 RX ADMIN — AMLODIPINE BESYLATE 5 MILLIGRAM(S): 2.5 TABLET ORAL at 05:34

## 2024-05-01 RX ADMIN — Medication 81 MILLIGRAM(S): at 11:35

## 2024-05-01 RX ADMIN — LOSARTAN POTASSIUM 50 MILLIGRAM(S): 100 TABLET, FILM COATED ORAL at 05:35

## 2024-05-01 RX ADMIN — HEPARIN SODIUM 5000 UNIT(S): 5000 INJECTION INTRAVENOUS; SUBCUTANEOUS at 05:34

## 2024-05-01 RX ADMIN — Medication 25 MICROGRAM(S): at 04:27

## 2024-05-01 RX ADMIN — ATORVASTATIN CALCIUM 10 MILLIGRAM(S): 80 TABLET, FILM COATED ORAL at 21:41

## 2024-05-01 NOTE — PROGRESS NOTE ADULT - SUBJECTIVE AND OBJECTIVE BOX
Patient is a 89y old  Female who presents with a chief complaint of unresponsive episode, bradycardia (01 May 2024 14:13)    Date of servie : 05-01-24 @ 14:39  INTERVAL HPI/OVERNIGHT EVENTS:  T(C): 36.8 (05-01-24 @ 12:20), Max: 37.2 (04-30-24 @ 19:27)  HR: 87 (05-01-24 @ 12:20) (82 - 97)  BP: 126/66 (05-01-24 @ 12:20) (126/66 - 146/78)  RR: 17 (05-01-24 @ 12:20) (17 - 18)  SpO2: 98% (05-01-24 @ 12:20) (97% - 98%)  Wt(kg): --  I&O's Summary      LABS:                        11.3   8.14  )-----------( 232      ( 01 May 2024 07:10 )             33.4     05-01    143  |  112<H>  |  23  ----------------------------<  112<H>  3.6   |  20<L>  |  0.61    Ca    8.1<L>      01 May 2024 07:10  Phos  3.3     05-01  Mg     1.90     05-01        Urinalysis Basic - ( 01 May 2024 07:10 )    Color: x / Appearance: x / SG: x / pH: x  Gluc: 112 mg/dL / Ketone: x  / Bili: x / Urobili: x   Blood: x / Protein: x / Nitrite: x   Leuk Esterase: x / RBC: x / WBC x   Sq Epi: x / Non Sq Epi: x / Bacteria: x      CAPILLARY BLOOD GLUCOSE            Urinalysis Basic - ( 01 May 2024 07:10 )    Color: x / Appearance: x / SG: x / pH: x  Gluc: 112 mg/dL / Ketone: x  / Bili: x / Urobili: x   Blood: x / Protein: x / Nitrite: x   Leuk Esterase: x / RBC: x / WBC x   Sq Epi: x / Non Sq Epi: x / Bacteria: x        MEDICATIONS  (STANDING):  amLODIPine   Tablet 5 milliGRAM(s) Oral daily  aspirin enteric coated 81 milliGRAM(s) Oral daily  atorvastatin 10 milliGRAM(s) Oral at bedtime  dextrose 5%. 1000 milliLiter(s) (50 mL/Hr) IV Continuous <Continuous>  folic acid 1 milliGRAM(s) Oral daily  heparin   Injectable 5000 Unit(s) SubCutaneous every 12 hours  influenza  Vaccine (HIGH DOSE) 0.7 milliLiter(s) IntraMuscular once  levothyroxine 25 MICROGram(s) Oral daily  losartan 50 milliGRAM(s) Oral daily  melatonin 6 milliGRAM(s) Oral <User Schedule>  traZODone 25 milliGRAM(s) Oral at bedtime    MEDICATIONS  (PRN):  acetaminophen     Tablet .. 650 milliGRAM(s) Oral every 6 hours PRN Temp greater or equal to 38C (100.4F), Mild Pain (1 - 3)  benzocaine/menthol Lozenge 1 Lozenge Oral every 3 hours PRN Sore Throat          PHYSICAL EXAM:  GENERAL: NAD, well-groomed, well-developed  HEAD:  Atraumatic, Normocephalic  CHEST/LUNG: Clear to percussion bilaterally; No rales, rhonchi, wheezing, or rubs  HEART: Regular rate and rhythm; No murmurs, rubs, or gallops  ABDOMEN: Soft, Nontender, Nondistended; Bowel sounds present  EXTREMITIES:  2+ Peripheral Pulses, No clubbing, cyanosis, or edema  LYMPH: No lymphadenopathy noted  SKIN: No rashes or lesions    Care Discussed with Consultants/Other Providers [ ] YES  [ ] NO

## 2024-05-01 NOTE — PROGRESS NOTE ADULT - SUBJECTIVE AND OBJECTIVE BOX
Date of service 5/01/24    extended hpi: 89 year old Female with PMH dementia A&Ox2, HTN, HLD, hypothyroid, recent onset of delusions who presents with unresponsive episode    Patient denies chest pain or shortness of breath.   Review of symptoms otherwise negative.    Review of Systems:   Constitutional: [ ] fevers, [ ] chills.   Skin: [ ] dry skin. [ ] rashes.  Psychiatric: [ ] depression, [ ] anxiety.   Gastrointestinal: [ ] BRBPR, [ ] melena.   Neurological: [ ] confusion. [ ] seizures. [ ] shuffling gait.   Ears,Nose,Mouth and Throat: [ ] ear pain [ ] sore throat.   Eyes: [ ] diplopia.   Respiratory: [ ] hemoptysis. [ ] shortness of breath  Cardiovascular: See HPI above  Hematologic/Lymphatic: [ ] anemia. [ ] painful nodes. [ ] prolonged bleeding.   Genitourinary: [ ] hematuria. [ ] flank pain.   Endocrine: [ ] significant change in weight. [ ] intolerance to heat and cold.     Review of systems [ x] otherwise negative, [ ] otherwise unable to obtain    FH: no family history of sudden cardiac death in first degree relatives    SH: [ ] tobacco, [ ] alcohol, [ ] drugs    acetaminophen     Tablet .. 650 milliGRAM(s) Oral every 6 hours PRN  amLODIPine   Tablet 5 milliGRAM(s) Oral daily  aspirin enteric coated 81 milliGRAM(s) Oral daily  atorvastatin 10 milliGRAM(s) Oral at bedtime  benzocaine/menthol Lozenge 1 Lozenge Oral every 3 hours PRN  dextrose 5%. 1000 milliLiter(s) IV Continuous <Continuous>  folic acid 1 milliGRAM(s) Oral daily  heparin   Injectable 5000 Unit(s) SubCutaneous every 12 hours  influenza  Vaccine (HIGH DOSE) 0.7 milliLiter(s) IntraMuscular once  levothyroxine 25 MICROGram(s) Oral daily  losartan 50 milliGRAM(s) Oral daily  melatonin 6 milliGRAM(s) Oral <User Schedule>  traZODone 25 milliGRAM(s) Oral at bedtime                            11.3   8.14  )-----------( 232      ( 01 May 2024 07:10 )             33.4     143  |  112<H>  |  23  ----------------------------<  112<H>  3.6   |  20<L>  |  0.61    Ca    8.1<L>      01 May 2024 07:10  Phos  3.3     05-01  Mg     1.90     05-01      T(C): 36.8 (05-01-24 @ 12:20), Max: 37.2 (04-30-24 @ 19:27)  HR: 87 (05-01-24 @ 12:20) (82 - 97)  BP: 126/66 (05-01-24 @ 12:20) (126/66 - 146/78)  RR: 17 (05-01-24 @ 12:20) (17 - 18)  SpO2: 98% (05-01-24 @ 12:20) (97% - 98%)  Wt(kg): --    General: Well nourished in no acute distress.   Head: Normocephalic and atraumatic.   Neck: No JVD. No bruits. Supple. Does not appear to be enlarged.   Cardiovascular: + S1,S2 ; RRR Soft systolic murmur at the left lower sternal border. No rubs noted.    Lungs: CTA b/l. No rhonchi, rales or wheezes.   Abdomen: + BS, soft. Non tender. Non distended. No rebound. No guarding.   Extremities: No clubbing/cyanosis/edema.   Neurologic: Moves all four extremities  Skin: Warm and moist. The patient's skin has normal elasticity and good skin turgor.   Psychiatric: Appropriate mood and affect.  Musculoskeletal: Normal range of motion    DATA    TELEMETRY: SR 80, brief PAT	      ECG:  	Sinus bradycardia NS ST-T changes    < from: CT Angio Head w/ IV Cont (04.21.24 @ 12:14) >  IMPRESSION:    CT HEAD:  1.  No evidence of acute intracranial hemorrhage or midline shift.  2.  Subacute to chronic appearing infarcts in the bilateral basal ganglia.  3.  Chronic small vessel disease.    CTA BRAIN:  1.  Multifocal severe stenosis in the left A2 segment with distal   reconstitution.  2.  Moderate to severe focal narrowing in the proximal right M1 segment.  3.  Other focal mild tomoderate luminal narrowings in the major   intracranial vasculature as discussed above.    CTA NECK:  1.  No evidence of critical stenosis by NASCET criteria.  2.  Nodular calcification in the right lobe of the thyroid. Recommend   nonemergent thyroid ultrasound for further characterization if clinically   indicated.  3.  The level degenerative change of the cervical spine, most   significantly at the C4-C5 level where there is severe narrowing of the   spinal canal. Recommend MRI of the cervicalspine for further evaluation.  4.  Subcentimeter nodule in the right lung apex. Recommend follow-up   imaging as per Fleischner criteria.  < end of copied text >    < from: MR Head w/wo IV Cont (04.26.24 @ 09:20) >    IMPRESSION:  Degraded by motion.  No evidence of a mass, abnormal enhancement, or current evidence of acute   ischemia.  Chronic ischemic changes as described    < end of copied text >      ASSESSMENT/PLAN: 	89 year old Female with PMH dementia A&Ox2, HTN, HLD, hypothyroid, recent onset of delusions who presents with unresponsive episode    --cont Norvasc and Losartan for HTN  --MRI negative as above  --not in clinical CHF  --tele with SR/ST/PAT, brief, cont to hold BBlocker due to bradycardia on admit  --if PAT persists/becomes symptomatic, can trial low dose BBlocker  --DC planning to rehab      Olesya ANDUJAR  [phone]

## 2024-05-01 NOTE — PROGRESS NOTE ADULT - ASSESSMENT
89F dementia (A&Ox2, walks with walker, eats regular food) from home w/ 24/7 private hire aides, HTN, HLD, hypothyroid, recent onset of delusions who presents with unresponsive episode 4/21 at 911am while sitting down for am breakfast noted to have relative hypotension and bradycarida, admitted for further eval.       Problem/Plan - 1:  ·  Problem: Unresponsiveness.   ·  Plan: Episode of slumping over and not responding at table on 4/21 at 911am, no prior episodes, no seizure history.  Noted to have relative hypotension and bradycardia.  Recent addition of trazodone 4 wks prior and risperidone low dose 1 wk prior.  - cards and neuro fu     Problem/Plan - 2:  ·  Problem: Bradycardia.   ·  Plan: - telemonitor   -  EP fu   Problem/Plan - 3:  ·  Problem: Dementia.   ·  Plan: At baseline can walk with walker, regular diet, knows family but can have short-term memory issues and forget dates and times, also has been having delusions  - hold trazodone and risperidone  - pysch fu     Problem/Plan - 4:  ·  Problem: HTN (hypertension).   ·  Plan: - cw current meds  - cards fu     Problem/Plan - 5:  ·  Problem: HLD (hyperlipidemia).   ·  Plan: - c/w atorvastatin 10 mg although given age unclear benefit  - hold gemfibrozil.    Problem/Plan - 6:  ·  Problem: Spinal stenosis.   ·  Plan: Noted on CT in c-spine  - OP f/u, PT.    Problem/Plan - 7:  ·  Problem: Imaging abnormalities.   ·  Plan: CTA incidentally found lung and thyroid nodule    Problem/Plan - 8:  ·  Problem: Prophylactic measure.   ·  Plan: Lovenox ppx  PT eval  DNR/DNI    dc planning 
89F dementia (A&Ox2, walks with walker, eats regular food) from home w/ 24/7 private hire aides, HTN, HLD, hypothyroid, recent onset of delusions who presents with unresponsive episode 4/21 at 911am while sitting down for am breakfast noted to have relative hypotension and bradycarida, admitted for further eval.       Problem/Plan - 1:  ·  Problem: Unresponsiveness.   ·  Plan: Episode of slumping over and not responding at table on 4/21 at 911am, no prior episodes, no seizure history.  Noted to have relative hypotension and bradycardia.  Recent addition of trazodone 4 wks prior and risperidone low dose 1 wk prior.  - cards and neuro fu   - MRI pending     Problem/Plan - 2:  ·  Problem: Bradycardia.   ·  Plan: - telemonitor   -  EP fu       Problem/Plan - 3:  ·  Problem: Dementia.   ·  Plan: At baseline can walk with walker, regular diet, knows family but can have short-term memory issues and forget dates and times, also has been having delusions  - hold trazodone and risperidone  - pysch fu     Problem/Plan - 4:  ·  Problem: HTN (hypertension).   ·  Plan: - cw current meds  - cards fu     Problem/Plan - 5:  ·  Problem: HLD (hyperlipidemia).   ·  Plan: - c/w atorvastatin 10 mg although given age unclear benefit  - hold gemfibrozil.    Problem/Plan - 6:  ·  Problem: Spinal stenosis.   ·  Plan: Noted on CT in c-spine  - OP f/u, PT.    Problem/Plan - 7:  ·  Problem: Imaging abnormalities.   ·  Plan: CTA incidentally found lung and thyroid nodule    Problem/Plan - 8:  ·  Problem: Prophylactic measure.   ·  Plan: Lovenox ppx  PT eval  DNR/DNI    case dw family   dc planning to rehab     
89F dementia (A&Ox2, walks with walker, eats regular food) from home w/ 24/7 private hire aides, HTN, HLD, hypothyroid, recent onset of delusions who presents with unresponsive episode 4/21 at 911am while sitting down for am breakfast noted to have relative hypotension and bradycarida, admitted for further eval.       Problem/Plan - 1:  ·  Problem: Unresponsiveness.   ·  Plan: Episode of slumping over and not responding at table on 4/21 at 911am, no prior episodes, no seizure history.  Noted to have relative hypotension and bradycardia.  Recent addition of trazodone 4 wks prior and risperidone low dose 1 wk prior.  - cards and neuro fu appreciated  - mendez neg     Problem/Plan - 2:  ·  Problem: Bradycardia.   ·  Plan: - telemonitor   -  EP fu appreciated     Problem/Plan - 3:  ·  Problem: Dementia. ·  Plan: At baseline can walk with walker, regular diet, knows family but can have short-term memory issues and forget dates and times, also has been having delusions  - hold trazodone and risperidone  - pysch fu appreciated     Problem/Plan - 4:  ·  Problem: HTN (hypertension).   ·  Plan: - cw current meds  - cards fu     Problem/Plan - 5:  ·  Problem: HLD (hyperlipidemia).   ·  Plan: - c/w atorvastatin 10 mg although given age unclear benefit  - hold gemfibrozil.    Problem/Plan - 6:  ·  Problem: Spinal stenosis.   ·  Plan: Noted on CT in c-spine  - OP f/u, PT.    Problem/Plan - 7:  ·  Problem: hypernatremia   ·  Plan: IVF  - poor po intake     Problem/Plan - 8:  ·  Problem: Prophylactic measure.   ·  Plan: Lovenox ppx  PT eval  DNR/DNI    dc planning in am  now recs is home vs rehab 
89F dementia (A&Ox2, walks with walker, eats regular food) from home w/ 24/7 private hire aides, HTN, HLD, hypothyroid, recent onset of delusions who presents with unresponsive episode 4/21 at 911am while sitting down for am breakfast noted to have relative hypotension and bradycarida, admitted for further eval.       Problem/Plan - 1:  ·  Problem: Unresponsiveness.   ·  Plan: Episode of slumping over and not responding at table on 4/21 at 911am, no prior episodes, no seizure history.  Noted to have relative hypotension and bradycardia.  Recent addition of trazodone 4 wks prior and risperidone low dose 1 wk prior.  - cards and neuro fu     Problem/Plan - 2:  ·  Problem: Bradycardia.   ·  Plan: - telemonitor   -  EP fu     Problem/Plan - 3:  ·  Problem: Dementia. ·  Plan: At baseline can walk with walker, regular diet, knows family but can have short-term memory issues and forget dates and times, also has been having delusions  - hold trazodone and risperidone  - pysch fu     Problem/Plan - 4:  ·  Problem: HTN (hypertension).   ·  Plan: - cw current meds  - cards fu     Problem/Plan - 5:  ·  Problem: HLD (hyperlipidemia).   ·  Plan: - c/w atorvastatin 10 mg although given age unclear benefit  - hold gemfibrozil.    Problem/Plan - 6:  ·  Problem: Spinal stenosis.   ·  Plan: Noted on CT in c-spine  - OP f/u, PT.    Problem/Plan - 7:  ·  Problem: hypernatremia   ·  Plan: IVF  - poor po intake     Problem/Plan - 8:  ·  Problem: Prophylactic measure.   ·  Plan: Lovenox ppx  PT eval  DNR/DNI    dc planning 
89F dementia (A&Ox2, walks with walker, eats regular food) from home w/ 24/7 private hire aides, HTN, HLD, hypothyroid, recent onset of delusions who presents with unresponsive episode 4/21 at 911am while sitting down for am breakfast noted to have relative hypotension and bradycarida, admitted for further eval.       Problem/Plan - 1:  ·  Problem: Unresponsiveness.   ·  Plan: Episode of slumping over and not responding at table on 4/21 at 911am, no prior episodes, no seizure history.  Noted to have relative hypotension and bradycardia.  Recent addition of trazodone 4 wks prior and risperidone low dose 1 wk prior.  - cards and neuro fu   - MRI pending     Problem/Plan - 2:  ·  Problem: Bradycardia.   ·  Plan: - telemonitor   -  EP fu   - EP fu     Problem/Plan - 3:  ·  Problem: Dementia.   ·  Plan: At baseline can walk with walker, regular diet, knows family but can have short-term memory issues and forget dates and times, also has been having delusions  - hold trazodone and risperidone  - pysch fu     Problem/Plan - 4:  ·  Problem: HTN (hypertension).   ·  Plan: - cw current meds  - cards fu     Problem/Plan - 5:  ·  Problem: HLD (hyperlipidemia).   ·  Plan: - c/w atorvastatin 10 mg although given age unclear benefit  - hold gemfibrozil.    Problem/Plan - 6:  ·  Problem: Spinal stenosis.   ·  Plan: Noted on CT in c-spine  - OP f/u, PT.    Problem/Plan - 7:  ·  Problem: Imaging abnormalities.   ·  Plan: CTA incidentally found lung and thyroid nodule    Problem/Plan - 8:  ·  Problem: Prophylactic measure.   ·  Plan: Lovenox ppx  PT eval  DNR/DNI    case dw family 
89F dementia (A&Ox2, walks with walker, eats regular food) from home w/ 24/7 private hire aides, HTN, HLD, hypothyroid, recent onset of delusions who presents with unresponsive episode 4/21 at 911am while sitting down for am breakfast noted to have relative hypotension and bradycarida, admitted for further eval.       Problem/Plan - 1:  ·  Problem: Unresponsiveness.   ·  Plan: Episode of slumping over and not responding at table on 4/21 at 911am, no prior episodes, no seizure history.  Noted to have relative hypotension and bradycardia.  Recent addition of trazodone 4 wks prior and risperidone low dose 1 wk prior.  - cards and neuro fu   - c/w tele for nowhigher BP goal    Problem/Plan - 2:  ·  Problem: Bradycardia.   ·  Plan: - telemonitor   -  EP fu   - hypomagnesemia: repleted   - holding BB  - EP fu     Problem/Plan - 3:  ·  Problem: Dementia.   ·  Plan: At baseline can walk with walker, regular diet, knows family but can have short-term memory issues and forget dates and times, also has been having delusions  - hold trazodone and risperidone  - consult psych in am re: med management for home given delusions distressing to pt reports being scared.    Problem/Plan - 4:  ·  Problem: HTN (hypertension).   ·  Plan: - cw current meds  - cards fu     Problem/Plan - 5:  ·  Problem: HLD (hyperlipidemia).   ·  Plan: - c/w atorvastatin 10 mg although given age unclear benefit  - hold gemfibrozil.    Problem/Plan - 6:  ·  Problem: Spinal stenosis.   ·  Plan: Noted on CT in c-spine  - OP f/u, PT.    Problem/Plan - 7:  ·  Problem: Imaging abnormalities.   ·  Plan: CTA incidentally found lung and thyroid nodule    Problem/Plan - 8:  ·  Problem: Prophylactic measure.   ·  Plan: Lovenox ppx  PT eval  DNR/DNI    Replete hypokalemia   
89F dementia (A&Ox2, walks with walker, eats regular food) from home w/ 24/7 private hire aides, HTN, HLD, hypothyroid, recent onset of delusions who presents with unresponsive episode 4/21 at 911am while sitting down for am breakfast noted to have relative hypotension and bradycarida, admitted for further eval.       Problem/Plan - 1:  ·  Problem: Unresponsiveness.   ·  Plan: Episode of slumping over and not responding at table on 4/21 at 911am, no prior episodes, no seizure history.  Noted to have relative hypotension and bradycardia.  Recent addition of trazodone 4 wks prior and risperidone low dose 1 wk prior.  - cards and neuro fu   - c/w tele for nowhigher BP goal    Problem/Plan - 2:  ·  Problem: Bradycardia.   ·  Plan: - telemonitor   -  EP fu   - hypomagnesemia: repleted - holding BB  - EP fu     Problem/Plan - 3:  ·  Problem: Dementia.   ·  Plan: At baseline can walk with walker, regular diet, knows family but can have short-term memory issues and forget dates and times, also has been having delusions  - hold trazodone and risperidone  - consult psych in am re: med management for home given delusions distressing to pt reports being scared.    Problem/Plan - 4:  ·  Problem: HTN (hypertension).   ·  Plan: - cw current meds  - cards fu     Problem/Plan - 5:  ·  Problem: HLD (hyperlipidemia).   ·  Plan: - c/w atorvastatin 10 mg although given age unclear benefit  - hold gemfibrozil.    Problem/Plan - 6:  ·  Problem: Spinal stenosis.   ·  Plan: Noted on CT in c-spine  - OP f/u, PT.    Problem/Plan - 7:  ·  Problem: Imaging abnormalities.   ·  Plan: CTA incidentally found lung and thyroid nodule    Problem/Plan - 8:  ·  Problem: Prophylactic measure.   ·  Plan: Lovenox ppx  PT eval  DNR/DNI    
89F dementia (A&Ox2, walks with walker, eats regular food) from home w/ 24/7 private hire aides, HTN, HLD, hypothyroid, recent onset of delusions who presents with unresponsive episode 4/21 at 911am while sitting down for am breakfast noted to have relative hypotension and bradycarida, admitted for further eval.       Problem/Plan - 1:  ·  Problem: Unresponsiveness.   ·  Plan: Episode of slumping over and not responding at table on 4/21 at 911am, no prior episodes, no seizure history.  Noted to have relative hypotension and bradycardia.  Recent addition of trazodone 4 wks prior and risperidone low dose 1 wk prior.  - cards and neuro fu   - sp MRI     Problem/Plan - 2:  ·  Problem: Bradycardia.   ·  Plan: - telemonitor   -  EP fu       Problem/Plan - 3:  ·  Problem: Dementia.   ·  Plan: At baseline can walk with walker, regular diet, knows family but can have short-term memory issues and forget dates and times, also has been having delusions  - hold trazodone and risperidone  - pysch fu     Problem/Plan - 4:  ·  Problem: HTN (hypertension).   ·  Plan: - cw current meds  - cards fu     Problem/Plan - 5:  ·  Problem: HLD (hyperlipidemia).   ·  Plan: - c/w atorvastatin 10 mg although given age unclear benefit  - hold gemfibrozil.    Problem/Plan - 6:  ·  Problem: Spinal stenosis.   ·  Plan: Noted on CT in c-spine  - OP f/u, PT.    Problem/Plan - 7:  ·  Problem: Imaging abnormalities.   ·  Plan: CTA incidentally found lung and thyroid nodule    Problem/Plan - 8:  ·  Problem: Prophylactic measure.   ·  Plan: Lovenox ppx  PT eval  DNR/DNI    case dw family   dc planning to rehab   
{\rtf1\fvmtnl48232\ansi\zrugmik7828\ftnbj\uc1\deff0  {\fonttbl{\f0 \fnil Segoe UI;}{\f1 \fnil \fcharset0 Segoe UI;}{\f2 \fnil Times New Norris;}}  {\colortbl ;\hwi166\gkssk643\sofu842 ;\red0\green0\blue0 ;\red0\green0\jabf026 ;\red0\green0\blue0 ;}  {\stylesheet{\f0\fs20 Normal;}{\cs1 Default Paragraph Font;}{\cs2\f0\fs16 Line Number;}{\cs3\f2\fs24\ul\cf3 Hyperlink;}}  {\*\revtbl{Unknown;}}  \bujgxa65822\jaoegs41353\bxshy6079\gbvgx0925\ttddj1661\dzamd4140\jjjwtov386\fpqxxip307\nogrowautofit\loubap413\formshade\nofeaturethrottle1\dntblnsbdb\fet4\aendnotes\aftnnrlc\pgbrdrhead\pgbrdrfoot  \sectd\ruglpj18614\qnzgvh94159\guttersxn0\vpjtqkog1852\qwnobxbs8096\wrkpklps9401\fltstlfo9456\nflvywc086\hjijpdg107\sbkpage\pgncont\pgndec  \plain\plain\f0\fs24\ql\plain\f0\fs24\plain\f1\fs16\vtyn7733\hich\f1\dbch\f1\loch\f1\cf2\fs16 89F dementia (A&Ox2, walks with walker, eats regular food) from home w/ 24/7 private hire aides, HTN, HLD, hypothyroid, recent onset of delusions who presents   with unresponsive episode 4/21 at 911am while sitting down for am breakfast noted to have relative hypotension and bradycarida, admitted for further eval. \par  \par  \par  \plain\f1\fs16\glzt1306\hich\f1\dbch\f1\loch\f1\cf2\fs16\b\ul{\field{\*\fldinst HYPERLINK [id number] }{\fldrslt Problem/Plan - 1:}}\plain\f1\fs16\lufo4207\hich\f1\dbch\f1\loch\f1\cf2\fs16\ql\par  \'b7  {\*\bkmkstart [id number]}{\*\bkmkend hx10899223114}Problem: {\*\bkmkstart sv84977809316}{\*\bkmkend kp78036954891}Unresponsiveness. \par  \'b7  {\*\bkmkstart qb57218902154}{\*\bkmkend bi97936749029}Plan: {\*\bkmkstart az52571612290}{\*\bkmkend vd95047696689}Episode of slumping over and not responding at table on 4/21 at 911am, no prior episodes, no seizure history.  Noted to have relative   hypotension and bradycardia.  Recent addition of trazodone 4 wks prior and risperidone low dose 1 wk prior.\par  - seen by EP, monitor on tele, K 4 and Mg 2 goal, holding BB from home; per my d/w family would not want pacing or PPM if indicated as goal is to forego pain and suffering or invasive procedures per her wishes\par  - seen by neuro, CTA H&N w/ chronic CVA and intracranial stenosis; cannot r/o CVA vs seizure at this time,  per neuro rec EEG and MRI, family OK to attempt these less invasive studies \par  - c/w tele for nowhigher BP goal\plain\f1\fs16\fjnd2678\hich\f1\dbch\f1\loch\f1\cf2\fs16\strike\plain\f1\fs16\snfy2501\hich\f1\dbch\f1\loch\f1\cf2\fs16\par  \par  \plain\f1\fs16\slfo0853\hich\f1\dbch\f1\loch\f1\cf2\fs16\b\ul{\field{\*\fldinst HYPERLINK [id number] }{\fldrslt Problem/Plan - 2:}}\plain\f1\fs16\asee8958\hich\f1\dbch\f1\loch\f1\cf2\fs16\ql\par  \'b7  {\*\bkmkstart og24783079073}{\*\bkmkend gc36619042965}Problem: {\*\bkmkstart pr25801938346}{\*\bkmkend wq90198567796}Bradycardia. \par  \'b7  {\*\bkmkstart lr22702736034}{\*\bkmkend au80077929188}Plan: {\*\bkmkstart hk26503923703}{\*\bkmkend cs27465040096}- as above\par  - saw by EP\par  - hypomagnesemia: repleted \par  - holding BB\plain\f1\fs16\fzqu7322\hich\f1\dbch\f1\loch\f1\cf2\fs16\strike\plain\f1\fs16\hqjv7663\hich\f1\dbch\f1\loch\f1\cf2\fs16 - EP fu \par  \par  \plain\f1\fs16\sofm3539\hich\f1\dbch\f1\loch\f1\cf2\fs16\b\ul{\field{\*\fldinst HYPERLINK [id number] }{\fldrslt Problem/Plan - 3:}}\plain\f1\fs16\inmb1722\hich\f1\dbch\f1\loch\f1\cf2\fs16\ql\par  \'b7  {\*\bkmkstart hw96877273522}{\*\bkmkend lo17165173699}Problem: {\*\bkmkstart so70932047675}{\*\bkmkend bl84406191612}Dementia. \par  \'b7  {\*\bkmkstart pz24635397275}{\*\bkmkend sj95290227880}Plan: {\*\bkmkstart ek82980966819}{\*\bkmkend ws62757916588}At baseline can walk with walker, regular diet, knows family but can have short-term memory issues and forget dates and times, also   has been having delusions\par  - hold trazodone and risperidone\par  - consult psych in am re: med management for home given delusions distressing to pt reports being scared.\plain\f1\fs16\bapa0501\hich\f1\dbch\f1\loch\f1\cf2\fs16\strike\plain\f1\fs16\oxjy2426\hich\f1\dbch\f1\loch\f1\cf2\fs16\par  \par  \plain\f1\fs16\hftf6156\hich\f1\dbch\f1\loch\f1\cf2\fs16\b\ul{\field{\*\fldinst HYPERLINK [id number] }{\fldrslt Problem/Plan - 4:}}\plain\f1\fs16\vtmn3173\hich\f1\dbch\f1\loch\f1\cf2\fs16\ql\par  \'b7  {\*\bkmkstart sg05921120611}{\*\bkmkend ar33986763725}Problem: {\*\bkmkstart uf82809800579}{\*\bkmkend fl36026932521}HTN (hypertension). \par  \'b7  {\*\bkmkstart ey01476891101}{\*\bkmkend de59531906276}Plan: {\*\bkmkstart gw82147239711}{\*\bkmkend wu57028926015}- holding amlodipine, HCTZ, losartan and metoprolol\par  \par  \plain\f1\fs16\rrnh8149\hich\f1\dbch\f1\loch\f1\cf2\fs16\b\ul{\field{\*\fldinst HYPERLINK [id number] }{\fldrslt Problem/Plan - 5:}}\plain\f1\fs16\ngeq6467\hich\f1\dbch\f1\loch\f1\cf2\fs16\ql\par  \'b7  {\*\bkmkstart qg11225775357}{\*\bkmkend hn79588095029}Problem: {\*\bkmkstart eh60481197395}{\*\bkmkend cg88115621170}HLD (hyperlipidemia). \par  \'b7  {\*\bkmkstart wt76031758092}{\*\bkmkend sl93361530653}Plan: {\*\bkmkstart ee57391907903}{\*\bkmkend pe86832890353}- c/w atorvastatin 10 mg although given age unclear benefit\par  - hold gemfibrozil.\par  \par  \plain\f1\fs16\bwgv2797\hich\f1\dbch\f1\loch\f1\cf2\fs16\b\ul{\field{\*\fldinst HYPERLINK [id number] }{\fldrslt Problem/Plan - 6:}}\plain\f1\fs16\uhnl9167\hich\f1\dbch\f1\loch\f1\cf2\fs16\ql\par  \'b7  {\*\bkmkstart bk99430248472}{\*\bkmkend pw49680372734}Problem: {\*\bkmkstart sa52544944158}{\*\bkmkend ph90657424967}Spinal stenosis. \par  \'b7  {\*\bkmkstart hh18104057998}{\*\bkmkend qa77189108681}Plan: {\*\bkmkstart sh51698225972}{\*\bkmkend ol99890247975}Noted on CT in c-spine\par  - OP f/u, PT.\par  \par  \plain\f1\fs16\vaqn8897\hich\f1\dbch\f1\loch\f1\cf2\fs16\b\ul{\field{\*\fldinst HYPERLINK [id number] }{\fldrslt Problem/Plan - 7:}}\plain\f1\fs16\hbkv4066\hich\f1\dbch\f1\loch\f1\cf2\fs16\ql\par  \'b7  {\*\bkmkstart ab30184301609}{\*\bkmkend mc96914120029}Problem: {\*\bkmkstart qy16788644627}{\*\bkmkend ei77474034213}Imaging abnormalities. \par  \'b7  {\*\bkmkstart yr79957344237}{\*\bkmkend lt74567811043}Plan: {\*\bkmkstart so23408229691}{\*\bkmkend gt43071214372}CTA incidentally found lung and thyroid nodule\par  \par  \plain\f1\fs16\gwmw7885\hich\f1\dbch\f1\loch\f1\cf2\fs16\b\ul{\field{\*\fldinst HYPERLINK [id number] }{\fldrslt Problem/Plan - 8:}}\plain\f1\fs16\ayng0854\hich\f1\dbch\f1\loch\f1\cf2\fs16\ql\par  \'b7  {\*\bkmkstart jw77082556775}{\*\bkmkend it48375489945}Problem: {\*\bkmkstart fs88079240515}{\*\bkmkend hn95540361464}Prophylactic measure. \par  \'b7  {\*\bkmkstart em99877611077}{\*\bkmkend qs80165503149}Plan: {\*\bkmkstart wv28312500299}{\*\bkmkend xu63665374499}Lovenox ppx\par  PT eval\par  DNR/DNI\plain\f1\fs16\arii7374\hich\f1\dbch\f1\loch\f1\cf2\fs16\strike\plain\f1\fs16\kjjo9169\hich\f1\dbch\f1\loch\f1\cf2\fs16\par  \plain\f0\fs20\tnak9084\hich\f0\dbch\f0\loch\f0\fs20\par  }  
89F dementia (A&Ox2, walks with walker, eats regular food) from home w/ 24/7 private hire aides, HTN, HLD, hypothyroid, recent onset of delusions who presents with unresponsive episode 4/21 at 911am while sitting down for am breakfast noted to have relative hypotension and bradycarida, admitted for further eval.       Problem/Plan - 1:  ·  Problem: Unresponsiveness.   ·  Plan: Episode of slumping over and not responding at table on 4/21 at 911am, no prior episodes, no seizure history.  Noted to have relative hypotension and bradycardia.  Recent addition of trazodone 4 wks prior and risperidone low dose 1 wk prior.  - cards and neuro fu   - MRI pending     Problem/Plan - 2:  ·  Problem: Bradycardia.   ·  Plan: - telemonitor   -  EP fu       Problem/Plan - 3:  ·  Problem: Dementia.   ·  Plan: At baseline can walk with walker, regular diet, knows family but can have short-term memory issues and forget dates and times, also has been having delusions  - hold trazodone and risperidone  - pysch fu     Problem/Plan - 4:  ·  Problem: HTN (hypertension).   ·  Plan: - cw current meds  - cards fu     Problem/Plan - 5:  ·  Problem: HLD (hyperlipidemia).   ·  Plan: - c/w atorvastatin 10 mg although given age unclear benefit  - hold gemfibrozil.    Problem/Plan - 6:  ·  Problem: Spinal stenosis.   ·  Plan: Noted on CT in c-spine  - OP f/u, PT.    Problem/Plan - 7:  ·  Problem: Imaging abnormalities.   ·  Plan: CTA incidentally found lung and thyroid nodule    Problem/Plan - 8:  ·  Problem: Prophylactic measure.   ·  Plan: Lovenox ppx  PT eval  DNR/DNI    case dw family   dc planning to rehab     
Mrs. Patino is a 89yoF presenting to ED with AMS described as non-verbal but no syncope. Tracking and blinking during the event. Initial EKG by EMS noted sinus bradycardia however subsequent telemetry shows sinus rhythm 80-90's. Patient continues to be confused, lethargic and tremulous with left sided weakness while in sinusrhythm.   TSH WNL  Trop elevated to 57  Brain CT scan with multiple areas of severe stenosis.     Overall this event is more likely neurologic in etiology then cardiac given persistent symptoms with sinus rhythm and  normal BP.   Awaiting EEG.  EP will sign off.  Can reconsult if necessary.              
Not applicable

## 2024-05-01 NOTE — PROGRESS NOTE ADULT - NUTRITIONAL ASSESSMENT
This patient has been assessed with a concern for Malnutrition and has been determined to have a diagnosis/diagnoses of Moderate protein-calorie malnutrition.    This patient is being managed with:   Diet Pureed-  Supplement Feeding Modality:  Oral  Ensure Plus High Protein Cans or Servings Per Day:  2       Frequency:  Daily  Entered: Apr 29 2024  2:44PM  
This patient has been assessed with a concern for Malnutrition and has been determined to have a diagnosis/diagnoses of Moderate protein-calorie malnutrition.    This patient is being managed with:   Diet Pureed-  Supplement Feeding Modality:  Oral  Ensure Plus High Protein Cans or Servings Per Day:  2       Frequency:  Daily  Entered: Apr 29 2024  2:44PM

## 2024-05-02 ENCOUNTER — TRANSCRIPTION ENCOUNTER (OUTPATIENT)
Age: 89
End: 2024-05-02

## 2024-05-02 VITALS
DIASTOLIC BLOOD PRESSURE: 88 MMHG | HEART RATE: 94 BPM | OXYGEN SATURATION: 99 % | RESPIRATION RATE: 18 BRPM | SYSTOLIC BLOOD PRESSURE: 145 MMHG | TEMPERATURE: 98 F

## 2024-05-02 LAB
ANION GAP SERPL CALC-SCNC: 14 MMOL/L — SIGNIFICANT CHANGE UP (ref 7–14)
BUN SERPL-MCNC: 27 MG/DL — HIGH (ref 7–23)
CALCIUM SERPL-MCNC: 8.4 MG/DL — SIGNIFICANT CHANGE UP (ref 8.4–10.5)
CHLORIDE SERPL-SCNC: 112 MMOL/L — HIGH (ref 98–107)
CO2 SERPL-SCNC: 18 MMOL/L — LOW (ref 22–31)
CREAT SERPL-MCNC: 0.67 MG/DL — SIGNIFICANT CHANGE UP (ref 0.5–1.3)
EGFR: 80 ML/MIN/1.73M2 — SIGNIFICANT CHANGE UP
GLUCOSE SERPL-MCNC: 125 MG/DL — HIGH (ref 70–99)
HCT VFR BLD CALC: 37 % — SIGNIFICANT CHANGE UP (ref 34.5–45)
HGB BLD-MCNC: 12.5 G/DL — SIGNIFICANT CHANGE UP (ref 11.5–15.5)
MAGNESIUM SERPL-MCNC: 1.9 MG/DL — SIGNIFICANT CHANGE UP (ref 1.6–2.6)
MCHC RBC-ENTMCNC: 33.8 GM/DL — SIGNIFICANT CHANGE UP (ref 32–36)
MCHC RBC-ENTMCNC: 34 PG — SIGNIFICANT CHANGE UP (ref 27–34)
MCV RBC AUTO: 100.5 FL — HIGH (ref 80–100)
NRBC # BLD: 0 /100 WBCS — SIGNIFICANT CHANGE UP (ref 0–0)
NRBC # FLD: 0 K/UL — SIGNIFICANT CHANGE UP (ref 0–0)
PHOSPHATE SERPL-MCNC: 3.8 MG/DL — SIGNIFICANT CHANGE UP (ref 2.5–4.5)
PLATELET # BLD AUTO: 262 K/UL — SIGNIFICANT CHANGE UP (ref 150–400)
POTASSIUM SERPL-MCNC: 3.8 MMOL/L — SIGNIFICANT CHANGE UP (ref 3.5–5.3)
POTASSIUM SERPL-SCNC: 3.8 MMOL/L — SIGNIFICANT CHANGE UP (ref 3.5–5.3)
RBC # BLD: 3.68 M/UL — LOW (ref 3.8–5.2)
RBC # FLD: 13.2 % — SIGNIFICANT CHANGE UP (ref 10.3–14.5)
SODIUM SERPL-SCNC: 144 MMOL/L — SIGNIFICANT CHANGE UP (ref 135–145)
WBC # BLD: 8.84 K/UL — SIGNIFICANT CHANGE UP (ref 3.8–10.5)
WBC # FLD AUTO: 8.84 K/UL — SIGNIFICANT CHANGE UP (ref 3.8–10.5)

## 2024-05-02 RX ORDER — METOPROLOL TARTRATE 50 MG
1 TABLET ORAL
Refills: 0 | DISCHARGE

## 2024-05-02 RX ORDER — RISPERIDONE 4 MG/1
0.5 TABLET ORAL
Refills: 0 | DISCHARGE

## 2024-05-02 RX ORDER — TRAZODONE HCL 50 MG
0.5 TABLET ORAL
Qty: 15 | Refills: 0
Start: 2024-05-02 | End: 2024-05-31

## 2024-05-02 RX ORDER — FOLIC ACID 0.8 MG
0 TABLET ORAL
Refills: 0 | DISCHARGE

## 2024-05-02 RX ORDER — HYDROCHLOROTHIAZIDE 25 MG
1 TABLET ORAL
Refills: 0 | DISCHARGE

## 2024-05-02 RX ORDER — LOSARTAN POTASSIUM 100 MG/1
1 TABLET, FILM COATED ORAL
Refills: 0 | DISCHARGE

## 2024-05-02 RX ORDER — LOSARTAN POTASSIUM 100 MG/1
1 TABLET, FILM COATED ORAL
Qty: 30 | Refills: 0
Start: 2024-05-02 | End: 2024-05-31

## 2024-05-02 RX ORDER — ASPIRIN/CALCIUM CARB/MAGNESIUM 324 MG
1 TABLET ORAL
Qty: 0 | Refills: 0 | DISCHARGE
Start: 2024-05-02

## 2024-05-02 RX ORDER — LANOLIN ALCOHOL/MO/W.PET/CERES
2 CREAM (GRAM) TOPICAL
Qty: 0 | Refills: 0 | DISCHARGE
Start: 2024-05-02

## 2024-05-02 RX ORDER — FOLIC ACID 0.8 MG
1 TABLET ORAL
Qty: 0 | Refills: 0 | DISCHARGE
Start: 2024-05-02

## 2024-05-02 RX ADMIN — AMLODIPINE BESYLATE 5 MILLIGRAM(S): 2.5 TABLET ORAL at 05:29

## 2024-05-02 RX ADMIN — LOSARTAN POTASSIUM 50 MILLIGRAM(S): 100 TABLET, FILM COATED ORAL at 05:29

## 2024-05-02 RX ADMIN — Medication 25 MICROGRAM(S): at 04:07

## 2024-05-02 RX ADMIN — HEPARIN SODIUM 5000 UNIT(S): 5000 INJECTION INTRAVENOUS; SUBCUTANEOUS at 05:30

## 2024-05-02 NOTE — DISCHARGE NOTE NURSING/CASE MANAGEMENT/SOCIAL WORK - PATIENT PORTAL LINK FT
You can access the FollowMyHealth Patient Portal offered by [facility] by registering at the following website: [email or web address]. By joining [facility]’s FollowMyHealth portal, you will also be able to view your health information using other applications (apps) compatible with our system.

## 2024-05-02 NOTE — PROGRESS NOTE ADULT - SUBJECTIVE AND OBJECTIVE BOX
Date of service 5/02/24    extended hpi: 89 year old Female with PMH dementia A&Ox2, HTN, HLD, hypothyroid, recent onset of delusions who presents with unresponsive episode    Patient denies chest pain or shortness of breath.   Review of symptoms otherwise negative.    Review of Systems:   Constitutional: [ ] fevers, [ ] chills.   Skin: [ ] dry skin. [ ] rashes.  Psychiatric: [ ] depression, [ ] anxiety.   Gastrointestinal: [ ] BRBPR, [ ] melena.   Neurological: [ ] confusion. [ ] seizures. [ ] shuffling gait.   Ears,Nose,Mouth and Throat: [ ] ear pain [ ] sore throat.   Eyes: [ ] diplopia.   Respiratory: [ ] hemoptysis. [ ] shortness of breath  Cardiovascular: See HPI above  Hematologic/Lymphatic: [ ] anemia. [ ] painful nodes. [ ] prolonged bleeding.   Genitourinary: [ ] hematuria. [ ] flank pain.   Endocrine: [ ] significant change in weight. [ ] intolerance to heat and cold.     Review of systems [x ] otherwise negative, [ ] otherwise unable to obtain    FH: no family history of sudden cardiac death in first degree relatives    SH: [ ] tobacco, [ ] alcohol, [ ] drugs    acetaminophen     Tablet .. 650 milliGRAM(s) Oral every 6 hours PRN  amLODIPine   Tablet 5 milliGRAM(s) Oral daily  aspirin enteric coated 81 milliGRAM(s) Oral daily  atorvastatin 10 milliGRAM(s) Oral at bedtime  benzocaine/menthol Lozenge 1 Lozenge Oral every 3 hours PRN  dextrose 5%. 1000 milliLiter(s) IV Continuous <Continuous>  folic acid 1 milliGRAM(s) Oral daily  heparin   Injectable 5000 Unit(s) SubCutaneous every 12 hours  influenza  Vaccine (HIGH DOSE) 0.7 milliLiter(s) IntraMuscular once  levothyroxine 25 MICROGram(s) Oral daily  losartan 50 milliGRAM(s) Oral daily  melatonin 6 milliGRAM(s) Oral <User Schedule>  traZODone 25 milliGRAM(s) Oral at bedtime                            12.5   8.84  )-----------( 262      ( 02 May 2024 06:40 )             37.0     144  |  112<H>  |  27<H>  ----------------------------<  125<H>  3.8   |  18<L>  |  0.67    Ca    8.4      02 May 2024 06:40  Phos  3.8     05-02  Mg     1.90     05-02    T(C): 36.7 (05-02-24 @ 04:58), Max: 36.8 (05-01-24 @ 12:20)  HR: 98 (05-02-24 @ 04:58) (87 - 101)  BP: 155/94 (05-02-24 @ 04:58) (126/66 - 166/84)  RR: 18 (05-02-24 @ 04:58) (17 - 18)  SpO2: 99% (05-02-24 @ 04:58) (97% - 99%)  Wt(kg): --    I&O's Summary    01 May 2024 07:01  -  02 May 2024 07:00  --------------------------------------------------------  IN: 0 mL / OUT: 300 mL / NET: -300 mL      General: Well nourished in no acute distress.   Head: Normocephalic and atraumatic.   Neck: No JVD. No bruits. Supple. Does not appear to be enlarged.   Cardiovascular: + S1,S2 ; RRR Soft systolic murmur at the left lower sternal border. No rubs noted.    Lungs: CTA b/l. No rhonchi, rales or wheezes.   Abdomen: + BS, soft. Non tender. Non distended. No rebound. No guarding.   Extremities: No clubbing/cyanosis/edema.   Neurologic: Moves all four extremities  Skin: Warm and moist. The patient's skin has normal elasticity and good skin turgor.   Psychiatric: Appropriate mood and affect.  Musculoskeletal: Normal range of motion    DATA    TELEMETRY: SR 80, brief PAT	      ECG:  	Sinus bradycardia NS ST-T changes    < from: CT Angio Head w/ IV Cont (04.21.24 @ 12:14) >  IMPRESSION:    CT HEAD:  1.  No evidence of acute intracranial hemorrhage or midline shift.  2.  Subacute to chronic appearing infarcts in the bilateral basal ganglia.  3.  Chronic small vessel disease.    CTA BRAIN:  1.  Multifocal severe stenosis in the left A2 segment with distal   reconstitution.  2.  Moderate to severe focal narrowing in the proximal right M1 segment.  3.  Other focal mild tomoderate luminal narrowings in the major   intracranial vasculature as discussed above.    CTA NECK:  1.  No evidence of critical stenosis by NASCET criteria.  2.  Nodular calcification in the right lobe of the thyroid. Recommend   nonemergent thyroid ultrasound for further characterization if clinically   indicated.  3.  The level degenerative change of the cervical spine, most   significantly at the C4-C5 level where there is severe narrowing of the   spinal canal. Recommend MRI of the cervicalspine for further evaluation.  4.  Subcentimeter nodule in the right lung apex. Recommend follow-up   imaging as per Fleischner criteria.  < end of copied text >    < from: MR Head w/wo IV Cont (04.26.24 @ 09:20) >    IMPRESSION:  Degraded by motion.  No evidence of a mass, abnormal enhancement, or current evidence of acute   ischemia.  Chronic ischemic changes as described    < end of copied text >      ASSESSMENT/PLAN: 	89 year old Female with PMH dementia A&Ox2, HTN, HLD, hypothyroid, recent onset of delusions who presents with unresponsive episode    --cont Norvasc and Losartan for HTN  --MRI negative as above  --not in clinical CHF  --tele with SR/ST/PAT, brief, cont to hold BBlocker due to bradycardia on admit  --if PAT persists/becomes symptomatic, can trial low dose BBlocker  --DC planning to rehab      Olesya ANDUJAR  [phone]

## 2024-05-02 NOTE — DISCHARGE NOTE NURSING/CASE MANAGEMENT/SOCIAL WORK - NSDCPEFALRISK_GEN_ALL_CORE
For information on Fall & Injury Prevention, visit: [email or web address] OR  [email or web address] OR  https://www.cdc.gov/steadi/patient.html

## 2024-05-02 NOTE — PROGRESS NOTE ADULT - PROVIDER SPECIALTY LIST ADULT
Cardiology
Hospitalist
Cardiology
Cardiology
Electrophysiology
Hospitalist
Cardiology
Cardiology
Hospitalist

## 2024-05-02 NOTE — PROGRESS NOTE ADULT - REASON FOR ADMISSION
unresponsive episode, bradycardia

## 2024-05-02 NOTE — PROGRESS NOTE ADULT - NS ATTEND AMEND GEN_ALL_CORE FT
Patient seen and examined. Agree with plan as detailed in PA/NP Note.     Restart Amlodipine at 5mg  C/w losartan      Scotty Baker MD  Pager: [phone]
Patient seen and examined. Agree with plan as detailed in PA/NP Note.     c/w losartan, continue to hold amlodipine for now  Hr better while of AVN    Scotty Baker MD  Pager: [phone]
Patient seen and examined. Agree with plan as detailed in PA/NP Note.     if PAT persists/becomes symptomatic, can trial low dose Denise Baker MD  Pager: [phone]
Patient seen and examined. Agree with plan as detailed in PA/NP Note.      Brief episode of PAT, can monitor for now, if becomes reoccurring can start low dose BB    Scotty Baker MD  Pager: [phone]
Patient seen and examined. Agree with plan as detailed in PA/NP Note.     Continue to hold BB, restart low dose ARB    Scotty Baker MD  Pager: [phone]
Patient seen and examined. Agree with plan as detailed in PA/NP Note.     brief asymptomatic PAT, if reoccrant or becomes symptomatic can restart low dose BB    Scotty Baker MD  Pager: [phone]
Patient seen and examined. Agree with plan as detailed in PA/NP Note.     if PAT persists/becomes symptomatic, can trial low dose BBlocker, family agreeable with plan    Scotty Baker MD  Pager: [phone]
Mrs. Patino is a 89yoF presenting to ED with AMS described as non-verbal but no syncope. Tracking and blinking during the event. Initial EKG by EMS noted sinus bradycardia however subsequent telemetry shows sinus rhythm 80-90's. Patient continues to be confused, lethargic and tremulous with left sided weakness while in sinus rhythm. TSH WNL. Trop elevated to 57. Brain CT scan with multiple areas of severe stenosis. Overall this event is more likely neurologic in etiology then cardiac given persistent symptoms with sinus rhythm and  normal BP. Awaiting EEG.

## 2024-05-10 ENCOUNTER — APPOINTMENT (OUTPATIENT)
Dept: GERIATRICS | Facility: CLINIC | Age: 89
End: 2024-05-10
Payer: MEDICARE

## 2024-05-10 VITALS
BODY MASS INDEX: 26.21 KG/M2 | TEMPERATURE: 98.4 F | SYSTOLIC BLOOD PRESSURE: 126 MMHG | DIASTOLIC BLOOD PRESSURE: 72 MMHG | RESPIRATION RATE: 14 BRPM | OXYGEN SATURATION: 99 % | WEIGHT: 121.13 LBS | HEART RATE: 80 BPM

## 2024-05-10 DIAGNOSIS — E04.1 NONTOXIC SINGLE THYROID NODULE: ICD-10-CM

## 2024-05-10 DIAGNOSIS — Z87.898 PERSONAL HISTORY OF OTHER SPECIFIED CONDITIONS: ICD-10-CM

## 2024-05-10 DIAGNOSIS — K59.09 OTHER CONSTIPATION: ICD-10-CM
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
## 2024-05-10 DIAGNOSIS — M48.02 SPINAL STENOSIS, CERVICAL REGION: ICD-10-CM

## 2024-05-10 DIAGNOSIS — R91.1 SOLITARY PULMONARY NODULE: ICD-10-CM

## 2024-05-10 PROCEDURE — 99495 TRANSJ CARE MGMT MOD F2F 14D: CPT

## 2024-05-10 RX ORDER — ACETAMINOPHEN 500 MG
1200-1000 TABLET ORAL
Qty: 1 | Refills: 0 | Status: DISCONTINUED | COMMUNITY
Start: 2024-03-22 | End: 2024-05-10

## 2024-05-10 RX ORDER — FAMOTIDINE 20 MG/1
20 TABLET, FILM COATED ORAL
Qty: 180 | Refills: 3 | Status: ACTIVE | COMMUNITY
Start: 2024-05-10 | End: 1900-01-01

## 2024-05-10 RX ORDER — HYDROCHLOROTHIAZIDE 12.5 MG/1
12.5 CAPSULE ORAL
Qty: 90 | Refills: 2 | Status: DISCONTINUED | COMMUNITY
Start: 2024-03-22 | End: 2024-05-10

## 2024-05-10 RX ORDER — METOPROLOL SUCCINATE 25 MG/1
25 TABLET, EXTENDED RELEASE ORAL DAILY
Refills: 0 | Status: DISCONTINUED | COMMUNITY
Start: 2024-03-22 | End: 2024-05-10

## 2024-05-10 RX ORDER — DOCUSATE SODIUM 100 MG/1
100 TABLET ORAL DAILY
Refills: 0 | Status: DISCONTINUED | COMMUNITY
Start: 2024-03-22 | End: 2024-05-10

## 2024-05-10 RX ORDER — RISPERIDONE 0.25 MG/1
0.25 TABLET, FILM COATED ORAL DAILY
Qty: 15 | Refills: 0 | Status: DISCONTINUED | COMMUNITY
Start: 2024-04-10 | End: 2024-05-10

## 2024-05-10 RX ORDER — SENNOSIDES 8.6 MG TABLETS 8.6 MG/1
8.6 TABLET ORAL
Qty: 180 | Refills: 1 | Status: DISCONTINUED | COMMUNITY
Start: 2024-04-01 | End: 2024-05-10

## 2024-05-10 RX ORDER — MULTIVIT-MIN/FOLIC/VIT K/LYCOP 400-300MCG
25 MCG TABLET ORAL DAILY
Qty: 30 | Refills: 3 | Status: DISCONTINUED | COMMUNITY
Start: 2024-03-22 | End: 2024-05-10

## 2024-05-10 RX ORDER — GEMFIBROZIL 600 MG/1
600 TABLET, FILM COATED ORAL
Qty: 180 | Refills: 1 | Status: DISCONTINUED | COMMUNITY
Start: 2024-03-22 | End: 2024-05-10

## 2024-05-10 RX ORDER — FOLIC ACID 1 MG/1
1 TABLET ORAL
Qty: 90 | Refills: 3 | Status: DISCONTINUED | COMMUNITY
Start: 2024-03-22 | End: 2024-05-10

## 2024-05-10 RX ORDER — ELECTROLYTES/DEXTROSE
SOLUTION, ORAL ORAL
Refills: 0 | Status: DISCONTINUED | COMMUNITY
Start: 2024-03-22 | End: 2024-05-10

## 2024-05-10 RX ORDER — CHOLECALCIFEROL (VITAMIN D3) 125 MCG
1500 CAPSULE ORAL TWICE DAILY
Refills: 0 | Status: DISCONTINUED | COMMUNITY
Start: 2024-03-22 | End: 2024-05-10

## 2024-05-10 RX ORDER — ATORVASTATIN CALCIUM 10 MG/1
10 TABLET, FILM COATED ORAL
Qty: 90 | Refills: 3 | Status: DISCONTINUED | COMMUNITY
Start: 2024-03-22 | End: 2024-05-10

## 2024-05-10 NOTE — DATA REVIEWED
[FreeTextEntry1] : ACC: [id number]     EXAM:  MR BRAIN WAW IC   ORDERED BY: ROSALIA HERNANDEZ  PROCEDURE DATE:  04/26/2024    INTERPRETATION:  MR BRAIN WITHOUT AND WITH IV CONTRAST  Clinical information: unresponsiveness, r/o CVA  A MRI of the brain was performed both prior to and after the administration of intravenous gadolinium.  TECHNIQUE: Precontrast imaging includes sagittal and axial T1, axial T2, axial FLAIR, axial SWI, and axial diffusion weighted imaging.  Postcontrast imaging includes axial T1 and volumetric T1 weighted imaging. IV Contrast:  Gadavist.  5.5 cc administered, 2.0 cc discarded.  COMPARISON: Exam is compared to recent head CT of 4/21/2024  FINDINGS: Exam is degraded by motion.  BRAIN PARENCHYMA: Mild to moderate atrophy. No current evidence of diffusion restriction to suggest acute ischemia. Chronic small infarct inferior aspect right cerebellar hemisphere. Chronic lacunar infarct right lentiform nucleus with associated hemosiderin deposition. Additional focus of hemosiderin right thalamus. Mild to moderate chronic periventricular and subcortical white matter microvascular ischemic changes. No parenchymal mass or mass effect. Corpus callosum well formed. No cerebellar tonsillar ectopia  VENTRICLES: No hydrocephalus.  No midline shift.  ENHANCEMENT: No evidence of abnormal parenchymal or dural enhancement.  EXTRA-AXIAL: No subdural or epidural collection.  No extra-axial mass.  Basal cisterns preserved.  OTHER: Moderate mucosal thickening left maxillary sinus. Prior bilateral lens replacement  IMPRESSION: Degraded by motion. No evidence of a mass, abnormal enhancement, or current evidence of acute ischemia. Chronic ischemic changes as described  --- End of Report ---      CONRAD MITCHELL MD; Attending Radiologist This document has been electronically signed. Apr 26 2024  4:13PM

## 2024-05-10 NOTE — REASON FOR VISIT
[Post Hospitalization] : a post hospitalization visit [Family Member] : family member [FreeTextEntry1] : h/o unwitnessed fall, ?syncope  [FreeTextEntry3] : wang Noel and WILNER  [FreeTextEntry2] : who assists with history due to patient with baseline cognitive impairment

## 2024-05-10 NOTE — ASSESSMENT
[FreeTextEntry1] : hospital d/c summary reviewed/discussed  Medications reviewed and reconciled Pt has difficulty swallowing tablets d/t sore throat - family wishes to limit medications to necessary only We agree to taper off chol meds, vitamins/supplements  Trial Pepcid 20mg BID for possible GERD  BP wnl today - c/w Norvasc 5mg BID - c/w Losartan 50mg daily  Monitor BM Prunes/prune juice for constipation, and Miralax PRN constipation >1 day  c/w Trazodone 25mg QHS  - Can give another 25mg Trazodone QHS (total of 50mg) PRN insomnia  Fall precautions c/w PT at home - c/w 24/7 assistance for safety and help w/ ADLs  Behavioral interventions   No further workup for lung nodule/thyroid nodule per GOC - monitor. Plan for concservative managment. priority is comfort and avoiding unnecessary interventions and complications of interventions.   f/u in 1-3 mo, unless earlier PRN

## 2024-05-10 NOTE — PHYSICAL EXAM
Result discussed with patient in clinic.    Dr. Anoop [place] [Normal Outer Ear/Nose] : the ears and nose were normal in appearance [Normal Hearing] : hearing was not normal [Normal] : no spinal tenderness [Normal Gait] : abnormal gait [No Clubbing, Cyanosis] : no clubbing or cyanosis of the fingernails [Involuntary Movements] : no involuntary movements were seen [Normal Insight/Judgment] : insight and judgment were not intact [FreeTextEntry1] : b/l eyelid ptosis  [de-identified] : b/l LE edema R>L [de-identified] : slow, cautious, unsteady, better with walker  [de-identified] : chronic skin changes b/l LE  [de-identified] : gait instability , confused  [de-identified] : calm, cooperative

## 2024-05-10 NOTE — HISTORY OF PRESENT ILLNESS
[FreeTextEntry1] : Per hospital d/c summary:  Discharge Date	02-May-2024 Admission Date	21-Apr-2024 15:29 Reason for Admission	unresponsive episode, bradycardia Hospital Course	 89F PMHx HTN, HLD, Hypothyroid, depression, dementia (A&Ox2) p/w unresponsive episode noted to have relative hypotension and bradycardia.   Unresponsiveness CTA H&N w/ chronic CVA and intracranial stenosis, MRI Head negative hold trazodone and risperidone Neuro: toxic-metabolic encephalopathy vs cardiogenic syncope vs related to underlying dementia   Hypotension and bradycardia Recent addition of trazodone 4 wks prior and risperidone 1 wk prior --> now on hold EP: holding BB, family would not want pacing or PPM if indicated as goal is to forego pain and suffering or invasive procedures per her wishes. Overall this event is more likely neurologic in etiology than cardiac given persistent symptoms with sinus rhythm and  normal BP.   Spinal stenosis Noted on CT in c-spine OP f/u   Lung and thyroid nodule CTA incidentally found lung and thyroid nodule OP f/u if in line w/ GOC.   On 5/2 , discussed with Dr. Baker, patient is medically cleared and optimized for discharge ay to rehab. All medications were reviewed with attending.   Seen by cards: Rec:  --cont Norvasc and Losartan for HTN --tele with SR/ST/PAT, brief, cont to hold BBlocker due to bradycardia on admit --if PAT persists/becomes symptomatic, can trial low dose BBlocker  Discharge Medications	 amLODIPine 5 mg oral tablet: 1 tab(s) orally 2 times a day aspirin 81 mg oral delayed release tablet: 1 tab(s) orally once a day atorvastatin 10 mg oral tablet: 1 tab(s) orally once a day (at bedtime) folic acid 1 mg oral tablet: 1 tab(s) orally once a day gemfibrozil 600 mg oral tablet: 1 tab(s) orally once a day levothyroxine 25 mcg (0.025 mg) oral tablet: 1 tab(s) orally once a day losartan 50 mg oral tablet: 1 tab(s) orally once a day melatonin 3 mg oral tablet: 2 tab(s) orally once a day (at bedtime) traZODone 50 mg oral tablet: 0.5 tab(s) orally once a day (at bedtime)    MRI BRAIN 04/26/2024: "...Chronic small infarct inferior aspect right cerebellar hemisphere. Chronic lacunar infarct right lentiform nucleus with associated hemosiderin deposition. Additional focus of hemosiderin right thalamus. Mild to moderate chronic periventricular and subcortical white matter microvascular ischemic changes...."   TODAY pt reports sore throat when swallowing solids/"heavy" foods/fluids. Limited historian d/t baseline memory loss.   Family reports pt overall doing better since d/c home.    APPETITE: decreased d/t sore throat, prefers pureed diet  TOILETING:  - has not been taking senna d/t sore throat.   Mobility improving but not back to where it was prior to hospitalization. Waits for assistance to get up to walk, with walker.  Doing PT at home.   Paranoid delusions manageable and better than they used to be before.  Sleep has been OK, except approx 3/7 nights/week had trouble staying asleep. Last night slept well.  On Trazodone 25mg QHS

## 2024-05-14 ENCOUNTER — APPOINTMENT (OUTPATIENT)
Dept: GERIATRICS | Facility: CLINIC | Age: 89
End: 2024-05-14
Payer: MEDICARE

## 2024-05-14 DIAGNOSIS — K21.9 GASTRO-ESOPHAGEAL REFLUX DISEASE W/OUT ESOPHAGITIS: ICD-10-CM

## 2024-05-14 DIAGNOSIS — Z74.09 OTHER REDUCED MOBILITY: ICD-10-CM

## 2024-05-14 DIAGNOSIS — Z78.9 OTHER REDUCED MOBILITY: ICD-10-CM

## 2024-05-14 PROCEDURE — 99214 OFFICE O/P EST MOD 30 MIN: CPT

## 2024-05-14 RX ORDER — NIRMATRELVIR AND RITONAVIR 300-100 MG
20 X 150 MG & KIT ORAL
Qty: 1 | Refills: 0 | Status: ACTIVE | COMMUNITY
Start: 2024-05-14 | End: 1900-01-01

## 2024-05-14 NOTE — PHYSICAL EXAM
[Normal] : the appearance was normal, neck was supple [No Respiratory Distress] : no respiratory distress [Respiration, Rhythm And Depth] : normal respiratory rhythm and effort [Normal Color / Pigmentation] : normal skin color and pigmentation [Normal Gait] : abnormal gait [de-identified] : drowsy, falling asleep [de-identified] : calm, confused

## 2024-05-14 NOTE — HISTORY OF PRESENT ILLNESS
[Home] : at home, [unfilled] , at the time of the visit. [Medical Office: ([facility])___] : at the medical office located in  [Family Member] : family member [Verbal consent obtained from patient] : the patient, [unfilled] [FreeTextEntry3] : wang Noel [FreeTextEntry1] :  Dry cough started this past Sunday (2 days ago). Sleeping more. Episode of loose stool this morning.  Otherwise at baseline.  Home COVID test +ve today.   Temp 97.9F, O2 sat 96%, 131/65, HR 78 today  Sore throat resolved since our last visit.

## 2024-05-14 NOTE — ASSESSMENT
[FreeTextEntry1] :  Mucinex BID for cough Supportive care Discussed r/b/a of paxlovid - will start x 5 days course eGFR 80  - c/w 24/7 assistance for safety and help w/ ADLs Fall precautions  Encourage/assist with PO intake, shawn fluids Activity as tolerated Safety/contact precautions discussed with patient/family  Hold Pepcid for now.  Monitor sore throat symptoms - if recurs can resume Pepcid.   If any new/worsening symptoms advised to call us asap or go to emergency room.

## 2024-05-18 PROBLEM — Z79.899 HIGH RISK MEDICATION USE: Status: ACTIVE | Noted: 2024-03-04

## 2024-05-18 PROBLEM — R26.89 IMPAIRED GAIT AND MOBILITY: Status: ACTIVE | Noted: 2024-03-04

## 2024-05-18 PROBLEM — R26.81 GAIT INSTABILITY: Status: ACTIVE | Noted: 2024-03-04

## 2024-05-18 NOTE — REASON FOR VISIT
[Follow-Up] : a follow-up visit [Family Member] : family member [FreeTextEntry1] : Dementia, paranoid delusions, insomnia  [FreeTextEntry3] : wang Noel  and WILNER Hernandez  [FreeTextEntry2] : who assist with history due to patient with baseline cognitive impairment

## 2024-05-18 NOTE — PHYSICAL EXAM
[Normal Outer Ear/Nose] : the ears and nose were normal in appearance [No Respiratory Distress] : no respiratory distress [No Acc Muscle Use] : no accessory muscle use [Respiration, Rhythm And Depth] : normal respiratory rhythm and effort [Normal] : no spinal tenderness [No Clubbing, Cyanosis] : no clubbing or cyanosis of the fingernails [Involuntary Movements] : no involuntary movements were seen [Normal Hearing] : hearing was not normal [Normal Gait] : abnormal gait [Normal Insight/Judgment] : insight and judgment were not intact [de-identified] : b/l LE pitting edema  [de-identified] : slow, cautious, unsteady  [de-identified] : chronic skin changes b/l LE  [de-identified] : gait instability  [de-identified] : calm, dysphoric mood, +delusions  [MocaTotal] : 9 [FreeTextEntry1] : 3/14/24
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
## 2024-05-18 NOTE — ASSESSMENT
[FreeTextEntry1] : Sleep hygiene discussed Fall precautions discussed.  - c/w 24/7 assistance for safety and help w/ ADLs   - Medications reviewed and discussed  - c/w Trazodone for insomnia/mood  EKG done and reviewed today: sinus cookie w/ short [place] s/o, Qtc wnl  - Discussed r/b/a of using Risperidone - including FDA black box warning of increased risk of stroke, myocardial infarction, EPS/parkinsonism, sedation, falls, and death, among other potential side effects. After discussion we decide that benefits outweigh the risks for patient and caregiver safety and quality of life. Priority is QOL/reducing paranoid delusions and improving sleep at this time per family.   After discussion we decide to trial low dose Risperidone daily for now.   - Education, counseling, support provided today  f/u with me in 1-2 wks, unless earlier PRN

## 2024-06-04 RX ORDER — LOSARTAN POTASSIUM 50 MG/1
50 TABLET, FILM COATED ORAL
Qty: 90 | Refills: 1 | Status: ACTIVE | COMMUNITY
Start: 2024-03-22 | End: 1900-01-01

## 2024-06-14 ENCOUNTER — APPOINTMENT (OUTPATIENT)
Dept: GERIATRICS | Facility: CLINIC | Age: 89
End: 2024-06-14
Payer: MEDICARE

## 2024-06-14 VITALS
DIASTOLIC BLOOD PRESSURE: 68 MMHG | BODY MASS INDEX: 25.32 KG/M2 | WEIGHT: 117 LBS | OXYGEN SATURATION: 97 % | HEART RATE: 72 BPM | TEMPERATURE: 98.1 F | RESPIRATION RATE: 18 BRPM | SYSTOLIC BLOOD PRESSURE: 130 MMHG

## 2024-06-14 DIAGNOSIS — F51.05 INSOMNIA DUE TO OTHER MENTAL DISORDER: ICD-10-CM

## 2024-06-14 DIAGNOSIS — F41.9 ANXIETY DISORDER, UNSPECIFIED: ICD-10-CM

## 2024-06-14 DIAGNOSIS — M79.89 OTHER SPECIFIED SOFT TISSUE DISORDERS: ICD-10-CM

## 2024-06-14 DIAGNOSIS — I10 ESSENTIAL (PRIMARY) HYPERTENSION: ICD-10-CM

## 2024-06-14 DIAGNOSIS — F99 INSOMNIA DUE TO OTHER MENTAL DISORDER: ICD-10-CM

## 2024-06-14 DIAGNOSIS — F01.518 VASCULAR DEMENTIA, UNSPECIFIED SEVERITY, WITH OTHER BEHAVIORAL DISTURBANCE: ICD-10-CM

## 2024-06-14 DIAGNOSIS — F32.A ANXIETY DISORDER, UNSPECIFIED: ICD-10-CM

## 2024-06-14 DIAGNOSIS — U07.1 COVID-19: ICD-10-CM

## 2024-06-14 DIAGNOSIS — F22 DELUSIONAL DISORDERS: ICD-10-CM

## 2024-06-14 PROCEDURE — G2211 COMPLEX E/M VISIT ADD ON: CPT

## 2024-06-14 PROCEDURE — 99215 OFFICE O/P EST HI 40 MIN: CPT

## 2024-06-14 NOTE — REVIEW OF SYSTEMS
[Loss Of Hearing] : hearing loss [Incontinence] : incontinence [As Noted in HPI] : as noted in HPI [Easy Bleeding] : a tendency for easy bleeding [Easy Bruising] : a tendency for easy bruising [Negative] : Endocrine

## 2024-06-14 NOTE — SOCIAL HISTORY
contact guard [With spouse] : lives with spouse [House] : in a house [FreeTextEntry1] : 24/7 A started in 2022.

## 2024-06-14 NOTE — PHYSICAL EXAM
[Normal Outer Ear/Nose] : the ears and nose were normal in appearance [Normal Hearing] : hearing was not normal [No Respiratory Distress] : no respiratory distress [No Acc Muscle Use] : no accessory muscle use [Respiration, Rhythm And Depth] : normal respiratory rhythm and effort [Normal] : no spinal tenderness [Normal Gait] : abnormal gait [No Clubbing, Cyanosis] : no clubbing or cyanosis of the fingernails [Involuntary Movements] : no involuntary movements were seen [Normal Insight/Judgment] : insight and judgment were not intact [de-identified] : b/l LE pitting edema R>L [de-identified] : slow, cautious, unsteady, better with walker  [de-identified] : chronic skin changes b/l LE  [de-identified] : gait instability  [de-identified] : calm, dysphoric mood, +delusions  [MocaTotal] : 9 [FreeTextEntry1] : 3/14/24

## 2024-06-14 NOTE — ASSESSMENT
[FreeTextEntry1] : Covid symptoms resolved, s/p Paxlovid - tolerated well  BP log from home reviewed.  BP generally OK or on lower side, on 2 antihypertensives, including Norvasc 5mg BID. +LE swelling After d/o r/b/a we decide to trial taper Norvasc to 5mg daily from BID Monitor BP and LE swelling If BP >150/90 can resume Norvasc 5mg BID  c/w leg elevation compression socks during daytime only, off at night  Sleep hygiene  Fall precautions discussed.  Adv to adhere to walker use - c/w 24/7 assistance for safety and help w/ ADLs   - c/w Trazodone 25mg QHS, can try additional 25mg PRN  for insomnia/mood/paranoia  f/u in 3 mo, unless earlier PRN

## 2024-06-14 NOTE — HISTORY OF PRESENT ILLNESS
[FreeTextEntry1] :  TODAY pt reports feels well. Denies complaints.   COVID symptoms resolved. Back to USOH.   - Mood/behavior:  Paranoid delusions - occasional and manageable now.    - Sleep:  Overall sleeping well except occasionally wakes up at night 1-2x.  - Tried Ambien for insomnia per family's request - did not work - side effects  - On Trazodone 25mg QHS  - Appetite: Eating more now that family is bringing more food.  Indigestion after dinner. Takes beano which helps.  Got dentures in 10/23.  - Motor Syx: Ambulates with walker but sometimes forgets to use it. Uses walker at home 85% of the time.  Fall in 2/24 - 2nd night of being on Ambien.   CHRONIC NECK and KNEE PAIN - Takes Tylenol PRN  - Toileting: Chronic constipation improves w/ prunes.  Wears depends at night for UI at night.   DEMENTIA  - 2/4/24: Memory changes progressively worse for past several years.   24/7 HHA.  has dementia.  3 HHAs, mainly for  who has UI and nocturia, diverticulosis with episodes of blood in stool.  Pt sleeps in separate bedroom.  - MoCA 9/30 w/ Cantonese translation on 3/14/24  BP low 100s at home, generally in teens - 120s systolic HTN / HLD / LE Swelling - Wears compression socks during daytime usually  - Norvasc 5mg BID - Losartan 50mg daily   HYPOTHYROIDISM  [TextBox_37] : Last vision exam prepandemic, wears reading glasses  [One fall no injury in past year] : Patient reported one fall in the past year without injury [Independent] : transferring/mobility [Full assistance needed] : Assistance needed managing medications [FAST Score: ____] : Functional Assessment Scale (FAST) Score: [unfilled] [Walker] : walker [Smoke Detector] : smoke detector [Carbon Monoxide Detector] : carbon monoxide detector [Driving Concerns] : not driving or driving without noted concerns [Wears Seat Belt] : wears seat belt [facility] : >12 [de-identified] : Few steps to get into 1st floor. Gets assistance to go/in out of home when goes out rarely.  [de-identified] : not driving  [Other reason not done] : Other reason not done [I have developed a follow-up plan documented below in the note.] : I have developed a follow-up plan documented below in the note. [de-identified] : On Antidepressant medication [CorneScale] : 23 on 3/14/24 per family  [GDS] : 7 on 3/14/24 per pt  [Moderate] : Stage: Moderate [Stable] : Status: Stable [Memory Lapses Or Loss] : stable memory impairment [Patient Observed To Be Agitated] : improved agitation [Hostility Toward Caregivers] : improved aggression [Sleep Disturbances] : improved sleep disturbances [] : improved wandering [Fixed Beliefs Contradicted By Reality (Delusions)] : improved delusions [Difficulty Finding Desired Words] : stable difficulty finding desired words [With Patient/Caregiver] : , with patient/caregiver [Designated Healthcare Proxy] : Designated healthcare proxy [AdvancecareDate] : 3/14/24 [FreeTextEntry4] : HCP form on file: primary HCP is dtr Shakira (in [place]), then oldest son Bruce (doctor), then dtr Birgit documented however she passed away in 2020, then son Bert.      [facility] previously discussed:  Priority is improving sleep.  MOLST not on file - pending completion

## 2024-06-25 RX ORDER — TRAZODONE HYDROCHLORIDE 50 MG/1
50 TABLET ORAL
Qty: 90 | Refills: 1 | Status: ACTIVE | COMMUNITY
Start: 2024-03-14

## 2024-07-25 ENCOUNTER — APPOINTMENT (OUTPATIENT)
Dept: GERIATRICS | Facility: CLINIC | Age: 89
End: 2024-07-25
Payer: MEDICARE

## 2024-07-25 VITALS
OXYGEN SATURATION: 99 % | BODY MASS INDEX: 25.08 KG/M2 | DIASTOLIC BLOOD PRESSURE: 68 MMHG | HEIGHT: 57 IN | TEMPERATURE: 97.2 F | HEART RATE: 64 BPM | SYSTOLIC BLOOD PRESSURE: 145 MMHG | WEIGHT: 116.25 LBS

## 2024-07-25 DIAGNOSIS — F51.05 INSOMNIA DUE TO OTHER MENTAL DISORDER: ICD-10-CM

## 2024-07-25 DIAGNOSIS — F99 INSOMNIA DUE TO OTHER MENTAL DISORDER: ICD-10-CM

## 2024-07-25 DIAGNOSIS — Z74.09 OTHER REDUCED MOBILITY: ICD-10-CM

## 2024-07-25 DIAGNOSIS — F22 DELUSIONAL DISORDERS: ICD-10-CM

## 2024-07-25 DIAGNOSIS — F41.9 ANXIETY DISORDER, UNSPECIFIED: ICD-10-CM

## 2024-07-25 DIAGNOSIS — F32.A ANXIETY DISORDER, UNSPECIFIED: ICD-10-CM

## 2024-07-25 DIAGNOSIS — Z78.9 OTHER REDUCED MOBILITY: ICD-10-CM

## 2024-07-25 DIAGNOSIS — M79.89 OTHER SPECIFIED SOFT TISSUE DISORDERS: ICD-10-CM

## 2024-07-25 DIAGNOSIS — I10 ESSENTIAL (PRIMARY) HYPERTENSION: ICD-10-CM

## 2024-07-25 DIAGNOSIS — R41.82 ALTERED MENTAL STATUS, UNSPECIFIED: ICD-10-CM

## 2024-07-25 DIAGNOSIS — R60.0 LOCALIZED EDEMA: ICD-10-CM

## 2024-07-25 DIAGNOSIS — Z71.89 OTHER SPECIFIED COUNSELING: ICD-10-CM

## 2024-07-25 DIAGNOSIS — F01.518 VASCULAR DEMENTIA, UNSPECIFIED SEVERITY, WITH OTHER BEHAVIORAL DISTURBANCE: ICD-10-CM

## 2024-07-25 PROCEDURE — 99497 ADVNCD CARE PLAN 30 MIN: CPT

## 2024-07-25 PROCEDURE — 99214 OFFICE O/P EST MOD 30 MIN: CPT

## 2024-07-25 PROCEDURE — G2211 COMPLEX E/M VISIT ADD ON: CPT

## 2024-07-25 NOTE — HISTORY OF PRESENT ILLNESS
[One fall no injury in past year] : Patient reported one fall in the past year without injury [Independent] : transferring/mobility [Full assistance needed] : Assistance needed managing medications [FAST Score: ____] : Functional Assessment Scale (FAST) Score: [unfilled] [Walker] : walker [Smoke Detector] : smoke detector [Carbon Monoxide Detector] : carbon monoxide detector [Wears Seat Belt] : wears seat belt [Other reason not done] : Other reason not done [I have developed a follow-up plan documented below in the note.] : I have developed a follow-up plan documented below in the note. [Moderate] : Stage: Moderate [Stable] : Status: Stable [Memory Lapses Or Loss] : stable memory impairment [Patient Observed To Be Agitated] : improved agitation [Hostility Toward Caregivers] : improved aggression [Sleep Disturbances] : improved sleep disturbances [] : improved wandering [Fixed Beliefs Contradicted By Reality (Delusions)] : improved delusions [Difficulty Finding Desired Words] : stable difficulty finding desired words [With Patient/Caregiver] : , with patient/caregiver [Designated Healthcare Proxy] : Designated healthcare proxy [FreeTextEntry1] : TODAY pt reports feels well. Denies complaints except buttock pain on/off when sitting down x 2 days.  Son/DIL report episode of "unresponsiveness" last weekend - was out walking around in hot weather at a local nursery picking out flowers when became weak and family lowered her down to the rollator seat. After approx 5 mins was responsive again and back to USOH after taking a nap at cooling off at home. Temp after the incident was noted to be 99 - higher than her usual.  Unclear if was drinking enough water past week.   Denies constipation, urinary habit changes, SOB, cough, CP, palpitations, HA, chills, other complaints.   - Mood/behavior:  Paranoid delusions about  - bothersome.   - Sleep:  Overall sleeping better except occasionally wakes up at night 1-2x.  - Tried Ambien for insomnia per family's request - did not work - side effects  - On Trazodone 75mg QHS  - Appetite: Eating more now that family is bringing more food.  Indigestion after dinner. Takes beano which helps.  Got dentures in 10/23.  - Motor Syx: Ambulates with walker but sometimes forgets to use it. Uses walker at home 85% of the time.  Fall in 2/24 - 2nd night of being on Ambien.   CHRONIC NECK and KNEE PAIN - Takes Tylenol PRN  - Toileting: Chronic constipation improves w/ prunes.  Wears depends at night for UI at night.   DEMENTIA  - 2/4/24: Memory changes progressively worse for past several years.   24/7 HHA.  has dementia.  3 HHAs, mainly for  who has UI and nocturia, diverticulosis with episodes of blood in stool.  Pt sleeps in separate bedroom.  - MoCA 9/30 w/ Cantonese translation on 3/14/24  BP low 100s at home, generally in teens - 120s systolic HTN / HLD / LE Swelling - Wears compression socks during daytime usually  - Norvasc 5mg daily  - Losartan 50mg daily   HYPOTHYROIDISM  [TextBox_37] : Last vision exam prepandemic, wears reading glasses  [Driving Concerns] : not driving or driving without noted concerns [[facility]] : >12 [de-identified] : Few steps to get into 1st floor. Gets assistance to go/in out of home when goes out rarely.  [de-identified] : not driving  [de-identified] : On Antidepressant medication [CorneScale] : 23 on 3/14/24 per family  [GDS] : 7 on 3/14/24 per pt  [DNR] : DNR [DNI] : DNI [I will adhere to the patient's wishes.] : I will adhere to the patient's wishes. [Time Spent: ___ minutes] : Time Spent: [unfilled] minutes [AdvancecareDate] : 7/25/24 [FreeTextEntry4] : HCP form on file: primary HCP is dtr Shakira (in [place]), then oldest son Bruce (doctor), then dtr Birgit documented however she passed away in 2020, then son Bert.      [facility] previously discussed:  Priority is improving sleep.  JAROD discussed: wishes for DNR, DNI, limited medical interventions - will assist with form completion

## 2024-07-25 NOTE — PHYSICAL EXAM
[Normal Outer Ear/Nose] : the ears and nose were normal in appearance [Normal] : no spinal tenderness [No Clubbing, Cyanosis] : no clubbing or cyanosis of the fingernails [Involuntary Movements] : no involuntary movements were seen [Normal Hearing] : hearing was not normal [Normal Gait] : abnormal gait [Normal Insight/Judgment] : insight and judgment were not intact [de-identified] : 1+ b/l LE pitting edema R>L [de-identified] : slow, cautious, unsteady, better with walker  [de-identified] : chronic skin changes b/l LE  [de-identified] : gait instability  [de-identified] : calm, cooperative [[employer]] : 9 [FreeTextEntry1] : 3/14/24

## 2024-07-25 NOTE — ASSESSMENT
[FreeTextEntry1] : Possible episode of heat exhaustion +/- fluid depletion Adv inc fluid intake Avoid going out in hot weather, stay in shade if necessary, sun protection, cool fluids, etc.  Lab work ordered - f/u results  Fall precautions  BP log from home reviewed - acceptable readings.  LE swelling somewhat improved c/w prior exam.  c/w leg elevation compression socks during daytime only, off at night c/w Norvasc 5mg daily.   Monitor BP and LE swelling If BP >150/90 can give extra dose of Norvasc 5mg  Sleep hygiene  Fall precautions.  Adv to adhere to walker use - c/w 24/7 assistance for safety and help w/ ADLs   Wish to improve insomnia We discussed r/b/a of inc Trazodone from 75mg to 100mg QHS for insomnia/mood/paranoia  - Education, counseling, support provided today Jovan fajardo  -Referred to SW for additional counseling, education, support, resources  f/u in 3 mo, unless earlier PRN

## 2024-07-25 NOTE — REASON FOR VISIT
[Follow-Up] : a follow-up visit [Family Member] : family member [FreeTextEntry1] : episode of AMS, Dementia, paranoid delusions, insomnia  [FreeTextEntry3] : wang Noel  and WILNER Hernandez  [FreeTextEntry2] : who assist with history due to patient with baseline cognitive impairment

## 2024-07-26 ENCOUNTER — LABORATORY RESULT (OUTPATIENT)
Age: 89
End: 2024-07-26

## 2024-07-26 LAB
ANION GAP SERPL CALC-SCNC: 13 MMOL/L
BASOPHILS # BLD AUTO: 0.04 K/UL
BASOPHILS NFR BLD AUTO: 0.6 %
BUN SERPL-MCNC: 22 MG/DL
CALCIUM SERPL-MCNC: 9.2 MG/DL
CHLORIDE SERPL-SCNC: 101 MMOL/L
CO2 SERPL-SCNC: 24 MMOL/L
CREAT SERPL-MCNC: 1.11 MG/DL
EGFR: 46 ML/MIN/1.73M2
EOSINOPHIL # BLD AUTO: 0.13 K/UL
EOSINOPHIL NFR BLD AUTO: 1.9 %
GLUCOSE SERPL-MCNC: 93 MG/DL
HCT VFR BLD CALC: 36.2 %
HGB BLD-MCNC: 12.1 G/DL
IMM GRANULOCYTES NFR BLD AUTO: 0.1 %
LYMPHOCYTES # BLD AUTO: 2.9 K/UL
LYMPHOCYTES NFR BLD AUTO: 41.4 %
MAN DIFF?: NORMAL
MCHC RBC-ENTMCNC: 33.4 GM/DL
MCHC RBC-ENTMCNC: 33.7 PG
MCV RBC AUTO: 100.8 FL
MONOCYTES # BLD AUTO: 0.69 K/UL
MONOCYTES NFR BLD AUTO: 9.8 %
NEUTROPHILS # BLD AUTO: 3.24 K/UL
NEUTROPHILS NFR BLD AUTO: 46.2 %
PLATELET # BLD AUTO: 174 K/UL
POTASSIUM SERPL-SCNC: 3.5 MMOL/L
RBC # BLD: 3.59 M/UL
RBC # FLD: 13.2 %
SODIUM SERPL-SCNC: 138 MMOL/L
WBC # FLD AUTO: 7.01 K/UL

## 2024-07-29 LAB
APPEARANCE: CLEAR
BILIRUBIN URINE: NEGATIVE
BLOOD URINE: NEGATIVE
COLOR: YELLOW
GLUCOSE QUALITATIVE U: NEGATIVE MG/DL
KETONES URINE: NEGATIVE MG/DL
LEUKOCYTE ESTERASE URINE: ABNORMAL
NITRITE URINE: NEGATIVE
PH URINE: 7
PROTEIN URINE: 30 MG/DL
SPECIFIC GRAVITY URINE: 1.01
UROBILINOGEN URINE: 0.2 MG/DL

## 2024-08-22 ENCOUNTER — APPOINTMENT (OUTPATIENT)
Dept: GERIATRICS | Facility: CLINIC | Age: 89
End: 2024-08-22
Payer: MEDICARE

## 2024-08-22 VITALS
HEIGHT: 57 IN | SYSTOLIC BLOOD PRESSURE: 145 MMHG | HEART RATE: 67 BPM | DIASTOLIC BLOOD PRESSURE: 69 MMHG | TEMPERATURE: 96.7 F | BODY MASS INDEX: 24.68 KG/M2 | WEIGHT: 114.38 LBS | OXYGEN SATURATION: 99 %

## 2024-08-22 DIAGNOSIS — Z78.9 OTHER REDUCED MOBILITY: ICD-10-CM

## 2024-08-22 DIAGNOSIS — Z79.899 OTHER LONG TERM (CURRENT) DRUG THERAPY: ICD-10-CM

## 2024-08-22 DIAGNOSIS — F41.9 ANXIETY DISORDER, UNSPECIFIED: ICD-10-CM

## 2024-08-22 DIAGNOSIS — Z74.09 OTHER REDUCED MOBILITY: ICD-10-CM

## 2024-08-22 DIAGNOSIS — Z23 ENCOUNTER FOR IMMUNIZATION: ICD-10-CM

## 2024-08-22 DIAGNOSIS — F22 DELUSIONAL DISORDERS: ICD-10-CM

## 2024-08-22 DIAGNOSIS — F99 INSOMNIA DUE TO OTHER MENTAL DISORDER: ICD-10-CM

## 2024-08-22 DIAGNOSIS — F32.A ANXIETY DISORDER, UNSPECIFIED: ICD-10-CM

## 2024-08-22 DIAGNOSIS — F01.518 VASCULAR DEMENTIA, UNSPECIFIED SEVERITY, WITH OTHER BEHAVIORAL DISTURBANCE: ICD-10-CM

## 2024-08-22 DIAGNOSIS — F51.05 INSOMNIA DUE TO OTHER MENTAL DISORDER: ICD-10-CM

## 2024-08-22 DIAGNOSIS — Z09 ENCOUNTER FOR FOLLOW-UP EXAMINATION AFTER COMPLETED TREATMENT FOR CONDITIONS OTHER THAN MALIGNANT NEOPLASM: ICD-10-CM

## 2024-08-22 PROCEDURE — G2211 COMPLEX E/M VISIT ADD ON: CPT

## 2024-08-22 PROCEDURE — 99214 OFFICE O/P EST MOD 30 MIN: CPT

## 2024-08-22 NOTE — PHYSICAL EXAM
[Normal Outer Ear/Nose] : the ears and nose were normal in appearance [Normal] : no spinal tenderness [No Clubbing, Cyanosis] : no clubbing or cyanosis of the fingernails [Involuntary Movements] : no involuntary movements were seen [Normal Hearing] : hearing was not normal [Normal Gait] : abnormal gait [Normal Insight/Judgment] : insight and judgment were not intact [de-identified] : 1+ b/l LE pitting edema R>L [de-identified] : slow, cautious, unsteady, better with walker  [de-identified] : chronic skin changes b/l LE  [de-identified] : gait instability  [de-identified] : calm, cooperative [[employer]] : 9 [FreeTextEntry1] : 3/14/24

## 2024-08-22 NOTE — PHYSICAL EXAM
[Normal Outer Ear/Nose] : the ears and nose were normal in appearance [Normal] : no spinal tenderness [No Clubbing, Cyanosis] : no clubbing or cyanosis of the fingernails [Involuntary Movements] : no involuntary movements were seen [Normal Hearing] : hearing was not normal [Normal Gait] : abnormal gait [Normal Insight/Judgment] : insight and judgment were not intact [de-identified] : 1+ b/l LE pitting edema R>L [de-identified] : slow, cautious, unsteady, better with walker  [de-identified] : chronic skin changes b/l LE  [de-identified] : gait instability  [de-identified] : calm, cooperative [[employer]] : 9 [FreeTextEntry1] : 3/14/24

## 2024-08-22 NOTE — HISTORY OF PRESENT ILLNESS
[One fall no injury in past year] : Patient reported one fall in the past year without injury [Independent] : transferring/mobility [Full assistance needed] : Assistance needed managing medications [FAST Score: ____] : Functional Assessment Scale (FAST) Score: [unfilled] [Walker] : walker [Smoke Detector] : smoke detector [Carbon Monoxide Detector] : carbon monoxide detector [Wears Seat Belt] : wears seat belt [Other reason not done] : Other reason not done [I have developed a follow-up plan documented below in the note.] : I have developed a follow-up plan documented below in the note. [Moderate] : Stage: Moderate [Stable] : Status: Stable [Memory Lapses Or Loss] : stable memory impairment [Patient Observed To Be Agitated] : improved agitation [Hostility Toward Caregivers] : improved aggression [Sleep Disturbances] : improved sleep disturbances [] : improved wandering [Fixed Beliefs Contradicted By Reality (Delusions)] : improved delusions [Difficulty Finding Desired Words] : stable difficulty finding desired words [With Patient/Caregiver] : , with patient/caregiver [Designated Healthcare Proxy] : Designated healthcare proxy [DNR] : DNR [DNI] : DNI [I will adhere to the patient's wishes.] : I will adhere to the patient's wishes. [Time Spent: ___ minutes] : Time Spent: [unfilled] minutes [FreeTextEntry1] : TODAY pt reports feels well. Denies complaints However limited historian due to baseline memory loss.  Son/DIL report Patient has been doing great since last visit.  - Mood/behavior:  Behaviors controlled.  Going out for walks 3 times a day. - Sleep:   Sleeping well.   - Tried Ambien for insomnia per family's request - did not work - side effects  - On Trazodone 100mg QHS  - Appetite: Eating more now that family is bringing more food.  Indigestion after dinner. Takes beano which helps.  Got dentures in 10/23.  - Motor Syx: Ambulates with walker but sometimes forgets to use it. Uses walker at home 85% of the time.  Fall in 2/24 - 2nd night of being on Ambien.   CHRONIC NECK and KNEE PAIN - Takes Tylenol PRN  - Toileting: Chronic constipation improves w/ prunes.  Wears depends at night for UI at night.   DEMENTIA  - 2/4/24: Memory changes progressively worse for past several years.   24/7 HHA.  has dementia.  3 HHAs, mainly for  who has UI and nocturia, diverticulosis with episodes of blood in stool.  Pt sleeps in separate bedroom.  - MoCA 9/30 w/ Cantonese translation on 3/14/24  BP low 100s at home, generally in teens - 120s systolic HTN / HLD / LE Swelling - Wears compression socks during daytime usually  - Norvasc 5mg daily  - Losartan 50mg daily   HYPOTHYROIDISM  [TextBox_37] : Last vision exam prepandemic, wears reading glasses  [Driving Concerns] : not driving or driving without noted concerns [[facility]] : >12 [de-identified] : Few steps to get into 1st floor. Gets assistance to go/in out of home when goes out rarely.  [de-identified] : not driving  [de-identified] : On Antidepressant medication [CorneScale] : 23 on 3/14/24 per family  [GDS] : 7 on 3/14/24 per pt  [AdvancecareDate] : 7/25/24 [FreeTextEntry4] : HCP form on file: primary HCP is dtr Shakira (in [place]), then oldest son Bruce (doctor), then dtr Birgit documented however she passed away in 2020, then son Bert.      [facility] previously discussed:  Priority is improving sleep.  JAROD discussed: wishes for DNR, DNI, limited medical interventions - will assist with form completion

## 2024-08-22 NOTE — ASSESSMENT
[FreeTextEntry1] : c/w regular fluid intake Avoid going out in hot weather, stay in shade if necessary, sun protection, cool fluids, etc.  Fall precautions  BP acceptable - c/w current mgmt.  c/w leg elevation compression socks during daytime only, off at night c/w Norvasc 5mg daily.   Monitor BP and LE swelling If BP >150/90 can give extra dose of Norvasc 5mg  Sleep hygiene  Fall precautions.  Adv to adhere to walker use - c/w 24/7 assistance for safety and help w/ ADLs  - c/w Trazodone 100mg QHS for insomnia/mood/paranoia  Immunization counseling today.  Family will obtain immunization records.  Follow-up with pharmacy for RSV vaccine.  f/u in 3 mo, unless earlier PRN

## 2024-08-22 NOTE — HISTORY OF PRESENT ILLNESS
[One fall no injury in past year] : Patient reported one fall in the past year without injury [Independent] : transferring/mobility [Full assistance needed] : Assistance needed managing medications [FAST Score: ____] : Functional Assessment Scale (FAST) Score: [unfilled] [Walker] : walker [Smoke Detector] : smoke detector [Carbon Monoxide Detector] : carbon monoxide detector [Wears Seat Belt] : wears seat belt [Other reason not done] : Other reason not done [I have developed a follow-up plan documented below in the note.] : I have developed a follow-up plan documented below in the note. [Moderate] : Stage: Moderate [Stable] : Status: Stable [Memory Lapses Or Loss] : stable memory impairment [Patient Observed To Be Agitated] : improved agitation [Hostility Toward Caregivers] : improved aggression [Sleep Disturbances] : improved sleep disturbances [] : improved wandering [Fixed Beliefs Contradicted By Reality (Delusions)] : improved delusions [Difficulty Finding Desired Words] : stable difficulty finding desired words [With Patient/Caregiver] : , with patient/caregiver [Designated Healthcare Proxy] : Designated healthcare proxy [DNR] : DNR [DNI] : DNI [I will adhere to the patient's wishes.] : I will adhere to the patient's wishes. [Time Spent: ___ minutes] : Time Spent: [unfilled] minutes [FreeTextEntry1] : TODAY pt reports feels well. Denies complaints However limited historian due to baseline memory loss.  Son/DIL report Patient has been doing great since last visit.  - Mood/behavior:  Behaviors controlled.  Going out for walks 3 times a day. - Sleep:   Sleeping well.   - Tried Ambien for insomnia per family's request - did not work - side effects  - On Trazodone 100mg QHS  - Appetite: Eating more now that family is bringing more food.  Indigestion after dinner. Takes beano which helps.  Got dentures in 10/23.  - Motor Syx: Ambulates with walker but sometimes forgets to use it. Uses walker at home 85% of the time.  Fall in 2/24 - 2nd night of being on Ambien.   CHRONIC NECK and KNEE PAIN - Takes Tylenol PRN  - Toileting: Chronic constipation improves w/ prunes.  Wears depends at night for UI at night.   DEMENTIA  - 2/4/24: Memory changes progressively worse for past several years.   24/7 HHA.  has dementia.  3 HHAs, mainly for  who has UI and nocturia, diverticulosis with episodes of blood in stool.  Pt sleeps in separate bedroom.  - MoCA 9/30 w/ Cantonese translation on 3/14/24  BP low 100s at home, generally in teens - 120s systolic HTN / HLD / LE Swelling - Wears compression socks during daytime usually  - Norvasc 5mg daily  - Losartan 50mg daily   HYPOTHYROIDISM  [TextBox_37] : Last vision exam prepandemic, wears reading glasses  [Driving Concerns] : not driving or driving without noted concerns [[facility]] : >12 [de-identified] : Few steps to get into 1st floor. Gets assistance to go/in out of home when goes out rarely.  [de-identified] : not driving  [de-identified] : On Antidepressant medication [CorneScale] : 23 on 3/14/24 per family  [GDS] : 7 on 3/14/24 per pt  [AdvancecareDate] : 7/25/24 [FreeTextEntry4] : HCP form on file: primary HCP is dtr Shakira (in [place]), then oldest son Bruce (doctor), then dtr Birgit documented however she passed away in 2020, then son Bert.      [facility] previously discussed:  Priority is improving sleep.  JAROD discussed: wishes for DNR, DNI, limited medical interventions - will assist with form completion

## 2024-10-25 ENCOUNTER — APPOINTMENT (OUTPATIENT)
Dept: GERIATRICS | Facility: CLINIC | Age: 89
End: 2024-10-25
Payer: MEDICARE

## 2024-10-25 VITALS
DIASTOLIC BLOOD PRESSURE: 72 MMHG | TEMPERATURE: 97.6 F | WEIGHT: 114 LBS | RESPIRATION RATE: 18 BRPM | SYSTOLIC BLOOD PRESSURE: 170 MMHG | HEART RATE: 68 BPM | OXYGEN SATURATION: 97 % | BODY MASS INDEX: 24.67 KG/M2

## 2024-10-25 DIAGNOSIS — F22 DELUSIONAL DISORDERS: ICD-10-CM

## 2024-10-25 DIAGNOSIS — Z71.89 OTHER SPECIFIED COUNSELING: ICD-10-CM

## 2024-10-25 DIAGNOSIS — F51.05 INSOMNIA DUE TO OTHER MENTAL DISORDER: ICD-10-CM

## 2024-10-25 DIAGNOSIS — M79.672 PAIN IN RIGHT FOOT: ICD-10-CM

## 2024-10-25 DIAGNOSIS — F32.A ANXIETY DISORDER, UNSPECIFIED: ICD-10-CM

## 2024-10-25 DIAGNOSIS — F01.518 VASCULAR DEMENTIA, UNSPECIFIED SEVERITY, WITH OTHER BEHAVIORAL DISTURBANCE: ICD-10-CM

## 2024-10-25 DIAGNOSIS — I10 ESSENTIAL (PRIMARY) HYPERTENSION: ICD-10-CM

## 2024-10-25 DIAGNOSIS — F99 INSOMNIA DUE TO OTHER MENTAL DISORDER: ICD-10-CM

## 2024-10-25 DIAGNOSIS — Z23 ENCOUNTER FOR IMMUNIZATION: ICD-10-CM

## 2024-10-25 DIAGNOSIS — M79.673 PAIN IN UNSPECIFIED FOOT: ICD-10-CM

## 2024-10-25 DIAGNOSIS — M79.671 PAIN IN RIGHT FOOT: ICD-10-CM

## 2024-10-25 DIAGNOSIS — F41.9 ANXIETY DISORDER, UNSPECIFIED: ICD-10-CM

## 2024-10-25 PROCEDURE — G2211 COMPLEX E/M VISIT ADD ON: CPT

## 2024-10-25 PROCEDURE — 99215 OFFICE O/P EST HI 40 MIN: CPT

## 2024-10-28 ENCOUNTER — MED ADMIN CHARGE (OUTPATIENT)
Age: 89
End: 2024-10-28

## 2025-01-10 NOTE — SWALLOW BEDSIDE ASSESSMENT ADULT - ASR SWALLOW RECOMMEND DIAG
partial removed, denies loose teeth, good dentition/missing teeth
Objective testing not warranted at this time given no overt signs of aspiration with no indication/concern for infection

## 2025-01-23 ENCOUNTER — APPOINTMENT (OUTPATIENT)
Dept: GERIATRICS | Facility: CLINIC | Age: 89
End: 2025-01-23
Payer: MEDICARE

## 2025-01-23 VITALS
WEIGHT: 108 LBS | HEART RATE: 69 BPM | OXYGEN SATURATION: 98 % | RESPIRATION RATE: 18 BRPM | DIASTOLIC BLOOD PRESSURE: 71 MMHG | SYSTOLIC BLOOD PRESSURE: 129 MMHG | TEMPERATURE: 98 F | BODY MASS INDEX: 23.3 KG/M2 | HEIGHT: 57 IN

## 2025-01-23 DIAGNOSIS — F41.9 ANXIETY DISORDER, UNSPECIFIED: ICD-10-CM

## 2025-01-23 DIAGNOSIS — F32.A ANXIETY DISORDER, UNSPECIFIED: ICD-10-CM

## 2025-01-23 DIAGNOSIS — F99 INSOMNIA DUE TO OTHER MENTAL DISORDER: ICD-10-CM

## 2025-01-23 DIAGNOSIS — R26.89 OTHER ABNORMALITIES OF GAIT AND MOBILITY: ICD-10-CM

## 2025-01-23 DIAGNOSIS — F22 DELUSIONAL DISORDERS: ICD-10-CM

## 2025-01-23 DIAGNOSIS — D64.9 ANEMIA, UNSPECIFIED: ICD-10-CM

## 2025-01-23 DIAGNOSIS — F51.05 INSOMNIA DUE TO OTHER MENTAL DISORDER: ICD-10-CM

## 2025-01-23 DIAGNOSIS — F01.518 VASCULAR DEMENTIA, UNSPECIFIED SEVERITY, WITH OTHER BEHAVIORAL DISTURBANCE: ICD-10-CM

## 2025-01-23 PROCEDURE — G2211 COMPLEX E/M VISIT ADD ON: CPT

## 2025-01-23 PROCEDURE — 99214 OFFICE O/P EST MOD 30 MIN: CPT

## 2025-01-24 LAB
ALBUMIN SERPL ELPH-MCNC: 4.5 G/DL
ALP BLD-CCNC: 56 U/L
ALT SERPL-CCNC: 21 U/L
ANION GAP SERPL CALC-SCNC: 17 MMOL/L
AST SERPL-CCNC: 28 U/L
BASOPHILS # BLD AUTO: 0.03 K/UL
BASOPHILS NFR BLD AUTO: 0.5 %
BILIRUB SERPL-MCNC: 0.3 MG/DL
BUN SERPL-MCNC: 23 MG/DL
CALCIUM SERPL-MCNC: 9 MG/DL
CHLORIDE SERPL-SCNC: 102 MMOL/L
CO2 SERPL-SCNC: 26 MMOL/L
CREAT SERPL-MCNC: 1 MG/DL
EGFR: 52 ML/MIN/1.73M2
EOSINOPHIL # BLD AUTO: 0.05 K/UL
EOSINOPHIL NFR BLD AUTO: 0.9 %
FERRITIN SERPL-MCNC: 343 NG/ML
GLUCOSE SERPL-MCNC: 101 MG/DL
HCT VFR BLD CALC: 37.8 %
HGB BLD-MCNC: 12.3 G/DL
IMM GRANULOCYTES NFR BLD AUTO: 0.2 %
IRON SATN MFR SERPL: 39 %
IRON SERPL-MCNC: 117 UG/DL
LYMPHOCYTES # BLD AUTO: 1.67 K/UL
LYMPHOCYTES NFR BLD AUTO: 28.5 %
MAN DIFF?: NORMAL
MCHC RBC-ENTMCNC: 32.5 G/DL
MCHC RBC-ENTMCNC: 33.9 PG
MCV RBC AUTO: 104.1 FL
MONOCYTES # BLD AUTO: 0.56 K/UL
MONOCYTES NFR BLD AUTO: 9.6 %
NEUTROPHILS # BLD AUTO: 3.54 K/UL
NEUTROPHILS NFR BLD AUTO: 60.3 %
PLATELET # BLD AUTO: 168 K/UL
POTASSIUM SERPL-SCNC: 3.5 MMOL/L
PROT SERPL-MCNC: 7.3 G/DL
RBC # BLD: 3.63 M/UL
RBC # FLD: 13.2 %
SODIUM SERPL-SCNC: 145 MMOL/L
TIBC SERPL-MCNC: 300 UG/DL
UIBC SERPL-MCNC: 183 UG/DL
WBC # FLD AUTO: 5.86 K/UL

## 2025-05-22 ENCOUNTER — NON-APPOINTMENT (OUTPATIENT)
Age: 89
End: 2025-05-22

## 2025-05-22 ENCOUNTER — APPOINTMENT (OUTPATIENT)
Dept: GERIATRICS | Facility: CLINIC | Age: 89
End: 2025-05-22

## 2025-05-22 VITALS
SYSTOLIC BLOOD PRESSURE: 149 MMHG | RESPIRATION RATE: 16 BRPM | HEART RATE: 71 BPM | HEIGHT: 57 IN | TEMPERATURE: 97.9 F | BODY MASS INDEX: 23.51 KG/M2 | DIASTOLIC BLOOD PRESSURE: 72 MMHG | OXYGEN SATURATION: 97 % | WEIGHT: 109 LBS

## 2025-05-22 DIAGNOSIS — E78.5 HYPERLIPIDEMIA, UNSPECIFIED: ICD-10-CM

## 2025-05-22 DIAGNOSIS — F41.9 ANXIETY DISORDER, UNSPECIFIED: ICD-10-CM

## 2025-05-22 DIAGNOSIS — Z13.21 ENCOUNTER FOR SCREENING FOR NUTRITIONAL DISORDER: ICD-10-CM

## 2025-05-22 DIAGNOSIS — E55.9 VITAMIN D DEFICIENCY, UNSPECIFIED: ICD-10-CM

## 2025-05-22 DIAGNOSIS — F01.518 VASCULAR DEMENTIA, UNSPECIFIED SEVERITY, WITH OTHER BEHAVIORAL DISTURBANCE: ICD-10-CM

## 2025-05-22 DIAGNOSIS — D64.9 ANEMIA, UNSPECIFIED: ICD-10-CM

## 2025-05-22 DIAGNOSIS — F22 DELUSIONAL DISORDERS: ICD-10-CM

## 2025-05-22 DIAGNOSIS — R39.81 FUNCTIONAL URINARY INCONTINENCE: ICD-10-CM

## 2025-05-22 DIAGNOSIS — F32.A ANXIETY DISORDER, UNSPECIFIED: ICD-10-CM

## 2025-05-22 DIAGNOSIS — I10 ESSENTIAL (PRIMARY) HYPERTENSION: ICD-10-CM

## 2025-05-22 DIAGNOSIS — E03.9 HYPOTHYROIDISM, UNSPECIFIED: ICD-10-CM

## 2025-05-22 PROCEDURE — 93000 ELECTROCARDIOGRAM COMPLETE: CPT | Mod: 59

## 2025-05-22 PROCEDURE — G2211 COMPLEX E/M VISIT ADD ON: CPT

## 2025-05-22 PROCEDURE — 99214 OFFICE O/P EST MOD 30 MIN: CPT

## 2025-05-28 LAB
25(OH)D3 SERPL-MCNC: 38.2 NG/ML
ALBUMIN SERPL ELPH-MCNC: 4.2 G/DL
ALP BLD-CCNC: 54 U/L
ALT SERPL-CCNC: 27 U/L
ANION GAP SERPL CALC-SCNC: 16 MMOL/L
AST SERPL-CCNC: 26 U/L
BASOPHILS # BLD AUTO: 0.06 K/UL
BASOPHILS NFR BLD AUTO: 1.1 %
BILIRUB SERPL-MCNC: 0.4 MG/DL
BUN SERPL-MCNC: 30 MG/DL
CALCIUM SERPL-MCNC: 9.3 MG/DL
CHLORIDE SERPL-SCNC: 100 MMOL/L
CHOLEST SERPL-MCNC: 255 MG/DL
CO2 SERPL-SCNC: 24 MMOL/L
CREAT SERPL-MCNC: 1.05 MG/DL
EGFRCR SERPLBLD CKD-EPI 2021: 49 ML/MIN/1.73M2
EOSINOPHIL # BLD AUTO: 0.07 K/UL
EOSINOPHIL NFR BLD AUTO: 1.2 %
FERRITIN SERPL-MCNC: 261 NG/ML
FOLATE SERPL-MCNC: >20 NG/ML
GLUCOSE SERPL-MCNC: 112 MG/DL
HCT VFR BLD CALC: 37 %
HDLC SERPL-MCNC: 73 MG/DL
HGB BLD-MCNC: 12.3 G/DL
IMM GRANULOCYTES NFR BLD AUTO: 0.4 %
IRON SATN MFR SERPL: 36 %
IRON SERPL-MCNC: 115 UG/DL
LDLC SERPL-MCNC: 155 MG/DL
LYMPHOCYTES # BLD AUTO: 1.95 K/UL
LYMPHOCYTES NFR BLD AUTO: 34.6 %
MAN DIFF?: NORMAL
MCHC RBC-ENTMCNC: 33.2 G/DL
MCHC RBC-ENTMCNC: 33.4 PG
MCV RBC AUTO: 100.5 FL
MONOCYTES # BLD AUTO: 0.67 K/UL
MONOCYTES NFR BLD AUTO: 11.9 %
NEUTROPHILS # BLD AUTO: 2.86 K/UL
NEUTROPHILS NFR BLD AUTO: 50.8 %
NONHDLC SERPL-MCNC: 182 MG/DL
PLATELET # BLD AUTO: 176 K/UL
POTASSIUM SERPL-SCNC: 4.2 MMOL/L
PROT SERPL-MCNC: 7.3 G/DL
RBC # BLD: 3.68 M/UL
RBC # FLD: 13.1 %
SODIUM SERPL-SCNC: 140 MMOL/L
TIBC SERPL-MCNC: 316 UG/DL
TRIGL SERPL-MCNC: 150 MG/DL
TSH SERPL-ACNC: 2.17 UIU/ML
UIBC SERPL-MCNC: 202 UG/DL
VIT B12 SERPL-MCNC: 868 PG/ML
WBC # FLD AUTO: 5.63 K/UL

## 2025-07-16 NOTE — PATIENT PROFILE ADULT - FOOD INSECURITY
no
PAST MEDICAL HISTORY:  Anemia     Atrial fibrillation and flutter     Breast cancer left breast    CAD (coronary artery disease)     Cataract     Colon cancer     DM (diabetes mellitus)     Hypertension     Hypothyroidism     Mixed hyperlipidemia     Osteopenia     Osteoporosis

## 2025-08-19 ENCOUNTER — APPOINTMENT (OUTPATIENT)
Dept: GERIATRICS | Facility: CLINIC | Age: 89
End: 2025-08-19

## 2025-08-19 VITALS
DIASTOLIC BLOOD PRESSURE: 76 MMHG | BODY MASS INDEX: 23.8 KG/M2 | RESPIRATION RATE: 13 BRPM | WEIGHT: 110 LBS | HEART RATE: 73 BPM | SYSTOLIC BLOOD PRESSURE: 169 MMHG | TEMPERATURE: 98 F | OXYGEN SATURATION: 96 %

## 2025-08-19 DIAGNOSIS — L98.9 DISORDER OF THE SKIN AND SUBCUTANEOUS TISSUE, UNSPECIFIED: ICD-10-CM

## 2025-08-19 PROCEDURE — G2211 COMPLEX E/M VISIT ADD ON: CPT

## 2025-08-19 PROCEDURE — 99215 OFFICE O/P EST HI 40 MIN: CPT

## 2025-08-26 ENCOUNTER — APPOINTMENT (OUTPATIENT)
Dept: DERMATOLOGY | Facility: CLINIC | Age: 89
End: 2025-08-26
Payer: MEDICARE

## 2025-08-26 DIAGNOSIS — L82.1 OTHER SEBORRHEIC KERATOSIS: ICD-10-CM

## 2025-08-26 PROCEDURE — 99203 OFFICE O/P NEW LOW 30 MIN: CPT
